# Patient Record
Sex: MALE | Race: WHITE | NOT HISPANIC OR LATINO | Employment: UNEMPLOYED | URBAN - METROPOLITAN AREA
[De-identification: names, ages, dates, MRNs, and addresses within clinical notes are randomized per-mention and may not be internally consistent; named-entity substitution may affect disease eponyms.]

---

## 2017-02-02 RX ORDER — OXYCODONE AND ACETAMINOPHEN 5; 325 MG/1; MG/1
1 TABLET ORAL EVERY 4 HOURS PRN
Refills: 0 | COMMUNITY
Start: 2017-01-19 | End: 2017-03-16 | Stop reason: HOSPADM

## 2017-02-02 RX ORDER — GABAPENTIN 600 MG/1
600 TABLET ORAL 3 TIMES DAILY
Refills: 5 | COMMUNITY
Start: 2017-01-16 | End: 2017-03-28

## 2017-02-13 ENCOUNTER — TELEPHONE (OUTPATIENT)
Dept: UROLOGY | Facility: CLINIC | Age: 27
End: 2017-02-13

## 2017-02-13 DIAGNOSIS — N20.0 NEPHROLITHIASIS: Primary | ICD-10-CM

## 2017-02-13 NOTE — TELEPHONE ENCOUNTER
----- Message from Nicole Cm sent at 2/13/2017  4:12 PM CST -----  Contact: Self [phone]  Patient is calling to get pain medication called in to the pharmacy he is having sever kidney pain. Please advice     [id number] - [place], [place] - [address] [facility] [phone] (Phone)  [phone] (Fax)

## 2017-02-14 NOTE — TELEPHONE ENCOUNTER
Patient missed his last urology appt.  He needs a CT if he has not already had this.  No pain meds until evaluation.  Last CT scan in 12/2016 did not show any obstructing stones.   CT RSS ordered.

## 2017-03-13 ENCOUNTER — TELEPHONE (OUTPATIENT)
Dept: UROLOGY | Facility: CLINIC | Age: 27
End: 2017-03-13

## 2017-03-13 ENCOUNTER — HOSPITAL ENCOUNTER (EMERGENCY)
Facility: HOSPITAL | Age: 27
Discharge: HOME | End: 2017-03-13
Attending: SURGERY
Payer: MEDICAID

## 2017-03-13 VITALS
HEART RATE: 82 BPM | WEIGHT: 235 LBS | SYSTOLIC BLOOD PRESSURE: 136 MMHG | RESPIRATION RATE: 20 BRPM | TEMPERATURE: 98 F | OXYGEN SATURATION: 100 % | BODY MASS INDEX: 33.64 KG/M2 | HEIGHT: 70 IN | DIASTOLIC BLOOD PRESSURE: 72 MMHG

## 2017-03-13 DIAGNOSIS — F11.90 OPIATE USE: Primary | ICD-10-CM

## 2017-03-13 DIAGNOSIS — G89.29 CHRONIC ABDOMINAL PAIN: ICD-10-CM

## 2017-03-13 DIAGNOSIS — R10.9 CHRONIC ABDOMINAL PAIN: ICD-10-CM

## 2017-03-13 LAB
ALBUMIN SERPL BCP-MCNC: 4.5 G/DL
ALP SERPL-CCNC: 102 U/L
ALT SERPL W/O P-5'-P-CCNC: 30 U/L
AMPHET+METHAMPHET UR QL: NEGATIVE
ANION GAP SERPL CALC-SCNC: 10 MMOL/L
AST SERPL-CCNC: 25 U/L
BARBITURATES UR QL SCN>200 NG/ML: NEGATIVE
BASOPHILS # BLD AUTO: 0.06 K/UL
BASOPHILS NFR BLD: 0.6 %
BENZODIAZ UR QL SCN>200 NG/ML: NORMAL
BILIRUB SERPL-MCNC: 1.2 MG/DL
BILIRUB UR QL STRIP: NEGATIVE
BUN SERPL-MCNC: 11 MG/DL
BZE UR QL SCN: NEGATIVE
CALCIUM SERPL-MCNC: 10.2 MG/DL
CANNABINOIDS UR QL SCN: NEGATIVE
CHLORIDE SERPL-SCNC: 103 MMOL/L
CLARITY UR: CLEAR
CO2 SERPL-SCNC: 30 MMOL/L
COLOR UR: YELLOW
CREAT SERPL-MCNC: 1.1 MG/DL
CREAT UR-MCNC: 130.6 MG/DL
DIFFERENTIAL METHOD: ABNORMAL
EOSINOPHIL # BLD AUTO: 0.2 K/UL
EOSINOPHIL NFR BLD: 1.7 %
ERYTHROCYTE [DISTWIDTH] IN BLOOD BY AUTOMATED COUNT: 14.3 %
EST. GFR  (AFRICAN AMERICAN): >60 ML/MIN/1.73 M^2
EST. GFR  (NON AFRICAN AMERICAN): >60 ML/MIN/1.73 M^2
GLUCOSE SERPL-MCNC: 81 MG/DL
GLUCOSE UR QL STRIP: NEGATIVE
HCT VFR BLD AUTO: 47.7 %
HGB BLD-MCNC: 15.8 G/DL
HGB UR QL STRIP: NEGATIVE
KETONES UR QL STRIP: NEGATIVE
LEUKOCYTE ESTERASE UR QL STRIP: NEGATIVE
LYMPHOCYTES # BLD AUTO: 2.7 K/UL
LYMPHOCYTES NFR BLD: 28.6 %
MCH RBC QN AUTO: 27.8 PG
MCHC RBC AUTO-ENTMCNC: 33.1 %
MCV RBC AUTO: 84 FL
METHADONE UR QL SCN>300 NG/ML: NEGATIVE
MONOCYTES # BLD AUTO: 1.2 K/UL
MONOCYTES NFR BLD: 13.2 %
NEUTROPHILS # BLD AUTO: 5.2 K/UL
NEUTROPHILS NFR BLD: 55.9 %
NITRITE UR QL STRIP: NEGATIVE
OPIATES UR QL SCN: NORMAL
PCP UR QL SCN>25 NG/ML: NEGATIVE
PH UR STRIP: 6 [PH] (ref 5–8)
PLATELET # BLD AUTO: 211 K/UL
PMV BLD AUTO: 11.7 FL
POTASSIUM SERPL-SCNC: 4 MMOL/L
PROT SERPL-MCNC: 8.3 G/DL
PROT UR QL STRIP: NEGATIVE
RBC # BLD AUTO: 5.69 M/UL
SODIUM SERPL-SCNC: 143 MMOL/L
SP GR UR STRIP: 1.02 (ref 1–1.03)
TOXICOLOGY INFORMATION: NORMAL
URN SPEC COLLECT METH UR: NORMAL
UROBILINOGEN UR STRIP-ACNC: NEGATIVE EU/DL
WBC # BLD AUTO: 9.34 K/UL

## 2017-03-13 PROCEDURE — 99284 EMERGENCY DEPT VISIT MOD MDM: CPT | Mod: 25

## 2017-03-13 PROCEDURE — 63600175 PHARM REV CODE 636 W HCPCS: Performed by: SURGERY

## 2017-03-13 PROCEDURE — 96372 THER/PROPH/DIAG INJ SC/IM: CPT

## 2017-03-13 PROCEDURE — 82570 ASSAY OF URINE CREATININE: CPT

## 2017-03-13 PROCEDURE — 81003 URINALYSIS AUTO W/O SCOPE: CPT

## 2017-03-13 PROCEDURE — 80053 COMPREHEN METABOLIC PANEL: CPT

## 2017-03-13 PROCEDURE — 85025 COMPLETE CBC W/AUTO DIFF WBC: CPT

## 2017-03-13 PROCEDURE — 36415 COLL VENOUS BLD VENIPUNCTURE: CPT

## 2017-03-13 RX ORDER — KETOROLAC TROMETHAMINE 10 MG/1
10 TABLET, FILM COATED ORAL EVERY 6 HOURS PRN
Qty: 15 TABLET | Refills: 0 | Status: SHIPPED | OUTPATIENT
Start: 2017-03-13 | End: 2017-03-28

## 2017-03-13 RX ORDER — KETOROLAC TROMETHAMINE 30 MG/ML
60 INJECTION, SOLUTION INTRAMUSCULAR; INTRAVENOUS
Status: COMPLETED | OUTPATIENT
Start: 2017-03-13 | End: 2017-03-13

## 2017-03-13 RX ADMIN — KETOROLAC TROMETHAMINE 60 MG: 30 INJECTION, SOLUTION INTRAMUSCULAR at 10:03

## 2017-03-13 NOTE — DISCHARGE INSTRUCTIONS
Abdominal Pain    Abdominal pain is pain in the stomach or belly area. Everyone has this pain from time to time. In many cases it goes away on its own. But abdominal pain can sometimes be due to a serious problem, such as appendicitis. So its important to know when to seek help.  Causes of abdominal pain  There are many possible causes of abdominal pain. Common causes in adults include:  · Constipation, diarrhea, or gas  · Stomach acid flowing back up into the esophagus (acid reflux or heartburn)  · Severe acid reflux, called GERD (gastroesophageal reflux disease)  · A sore in the lining of the stomach or small intestine (peptic ulcer)  · Inflammation of the gallbladder, liver, or pancreas  · Gallstones or kidney stones  · Appendicitis   · Intestinal blockage   · An internal organ pushing through a muscle or other tissue (hernia)  · Urinary tract infections  · In women, menstrual cramps, fibroids, or endometriosis  · Inflammation or infection of the intestines  Diagnosing the cause of abdominal pain  Your healthcare provider will do a physical exam help find the cause of your pain. If needed, tests will be ordered. Belly pain has many possible causes. So it can be hard to find the reason for your pain. Giving details about your pain can help. Tell your provider where and when you feel the pain, and what makes it better or worse. Also let your provider know if you have other symptoms such as:  · Fever  · Tiredness  · Upset stomach (nausea)  · Vomiting  · Changes in bathroom habits  Treating abdominal pain  Some causes of pain need emergency medical treatment right away. These include appendicitis or a bowel blockage. Other problems can be treated with rest, fluids, or medicines. Your healthcare provider can give you specific instructions for treatment or self-care based on what is causing your pain.  If you have vomiting or diarrhea, sip water or other clear fluids. When you are ready to eat solid foods again,  start with small amounts of easy-to-digest, low-fat foods. These include apple sauce, toast, or crackers.   When to seek medical care  Call 911 or go to the hospital right away if you:  · Cant pass stool and are vomiting  · Are vomiting blood or have bloody diarrhea or black, tarry diarrhea  · Have chest, neck, or shoulder pain  · Feel like you might pass out  · Have pain in your shoulder blades with nausea  · Have sudden, severe belly pain  · Have new, severe pain unlike any you have felt before  · Have a belly that is rigid, hard, and tender to touch  Call your healthcare provider if you have:  · Pain for more than 5 days  · Bloating for more than 2 days  · Diarrhea for more than 5 days  · A fever of 100.4°F (38.0°C) or higher, or as directed by your provider  · Pain that gets worse  · Weight loss for no reason  · Continued lack of appetite  · Blood in your stool  How to prevent abdominal pain  Here are some tips to help prevent abdominal pain:  · Eat smaller amounts of food at one time.  · Avoid greasy, fried, or other high-fat foods.  · Avoid foods that give you gas.  · Exercise regularly.  · Drink plenty of fluids.  To help prevent GERD symptoms:  · Quit smoking.  · Reduce alcohol and certain foods that increase stomach acid.  · Avoid aspirin and over-the-counter pain and fever medicines (NSAIDS or nonsteroidal anti-inflammatory drugs), if possible  · Lose extra weight.  · Finish eating at least 2 hours before you go to bed or lie down.  · Raise the head of your bed.  Date Last Reviewed: 7/1/2016  © 9116-7936 [employer]. [address] [place] [address], [place] [address]. All rights reserved. This information is not intended as a substitute for professional medical care. Always follow your healthcare professional's instructions.

## 2017-03-13 NOTE — TELEPHONE ENCOUNTER
----- Message from Avis Ledezma sent at 3/13/2017  8:19 AM CDT -----  Contact: [phone]/ pt's wife   Patient has kidney stones and would like to be seen today. Please advise.

## 2017-03-13 NOTE — TELEPHONE ENCOUNTER
----- Message from Avis Ledezma sent at 3/13/2017 11:08 AM CDT -----  Contact: [phone]/ pt's wife   Patient called in returning your call. Please advise.

## 2017-03-13 NOTE — ED AVS SNAPSHOT
[facility]  [address] [facility] [address]               Emmanuel Ervin Jr.   3/13/2017  9:23 AM   ED    Description:  Male : 1990   Department:  [facility]           Your Care was Coordinated By:     Provider Role From To    Wang Fraser MD Attending Provider 17 0913 --      Reason for Visit     Abdominal Pain           Diagnoses this Visit        Comments    Chronic abdominal pain    -  Primary       ED Disposition     ED Disposition Condition Comment    Discharge             To Do List           Follow-up Information     Follow up with Paul Schrader MD. Schedule an appointment as soon as possible for a visit in 2 days.    Specialty:  Family Medicine    Contact information:    [address] [facility]  [place] [address] [phone]         These Medications        Disp Refills Start End    ketorolac (TORADOL) 10 mg tablet 15 tablet 0 3/13/2017     Take 1 tablet (10 mg total) by mouth every 6 (six) hours as needed for Pain. - Oral    Pharmacy: [id number] - [place], [place] - [address] [facility] Ph #: [phone]         [facility]     [facility] -  Assistance  Registered nurses in the [facility] provide clinical advisement, health education, appointment booking, and other advisory services.  Call for this free service at [phone].             Medications           Message regarding Medications     Verify the changes and/or additions to your medication regime listed below are the same as discussed with your clinician today.  If any of these changes or additions are incorrect, please notify your healthcare provider.        START taking these NEW medications        Refills    ketorolac (TORADOL) 10 mg tablet 0    Sig: Take 1 tablet (10 mg total) by mouth every 6 (six) hours as needed for Pain.    Class: Normal    Route: Oral      These medications were administered  "today        Dose Freq    ketorolac injection 60 mg 60 mg ED 1 Time    Sig: Inject 2 mLs (60 mg total) into the muscle ED 1 Time.    Class: Normal    Route: Intramuscular           Verify that the below list of medications is an accurate representation of the medications you are currently taking.  If none reported, the list may be blank. If incorrect, please contact your healthcare provider. Carry this list with you in case of emergency.           Current Medications     alprazolam (XANAX) 1 MG tablet Take 1 tablet by mouth 2 (two) times daily.    atenolol (TENORMIN) 50 MG tablet Take 50 mg by mouth once daily.    gabapentin (NEURONTIN) 600 MG tablet Take 600 mg by mouth 3 (three) times daily.    ketorolac (TORADOL) 10 mg tablet Take 1 tablet (10 mg total) by mouth every 6 (six) hours as needed for Pain.    oxycodone-acetaminophen (PERCOCET)  mg per tablet Take 1 tablet by mouth every 4 (four) hours as needed for Pain.    oxycodone-acetaminophen (PERCOCET) 5-325 mg per tablet Take 1 tablet by mouth every 4 (four) hours as needed. for pain.    quetiapine (SEROQUEL) 300 MG Tab Take by mouth.           Clinical Reference Information           Your Vitals Were     BP Pulse Temp Resp Height Weight    136/98 (BP Location: Left arm, Patient Position: Sitting) 100 98 °F (36.7 °C) (Oral) 18 5' 10" (1.778 m) 106.6 kg (235 lb)    SpO2 BMI             100% 33.72 kg/m2         Allergies as of 3/13/2017     No Known Allergies      Immunizations Administered on Date of Encounter - 3/13/2017     None      ED Micro, Lab, POCT     Start Ordered       Status Ordering Provider    03/13/17 1022 03/13/17 1021  CBC auto differential  STAT      Final result     03/13/17 1022 03/13/17 1021  Comprehensive metabolic panel  STAT      Final result     03/13/17 0927 03/13/17 0926  Urinalysis  STAT      Final result     03/13/17 0927 03/13/17 0926  Drug screen panel, emergency  STAT      Final result       ED Imaging Orders     Start " Ordered       Status Ordering Provider    03/13/17 1022 03/13/17 1021  CT Renal Stone Study ABD Pelvis WO  1 time imaging     Comments:  Right flank and right lower quadrant pain, insists that he is having a recurrent kidney stone.  Patient had a negative CT in December    Final result         Discharge Instructions         Abdominal Pain    Abdominal pain is pain in the stomach or belly area. Everyone has this pain from time to time. In many cases it goes away on its own. But abdominal pain can sometimes be due to a serious problem, such as appendicitis. So its important to know when to seek help.  Causes of abdominal pain  There are many possible causes of abdominal pain. Common causes in adults include:  · Constipation, diarrhea, or gas  · Stomach acid flowing back up into the esophagus (acid reflux or heartburn)  · Severe acid reflux, called GERD (gastroesophageal reflux disease)  · A sore in the lining of the stomach or small intestine (peptic ulcer)  · Inflammation of the gallbladder, liver, or pancreas  · Gallstones or kidney stones  · Appendicitis   · Intestinal blockage   · An internal organ pushing through a muscle or other tissue (hernia)  · Urinary tract infections  · In women, menstrual cramps, fibroids, or endometriosis  · Inflammation or infection of the intestines  Diagnosing the cause of abdominal pain  Your healthcare provider will do a physical exam help find the cause of your pain. If needed, tests will be ordered. Belly pain has many possible causes. So it can be hard to find the reason for your pain. Giving details about your pain can help. Tell your provider where and when you feel the pain, and what makes it better or worse. Also let your provider know if you have other symptoms such as:  · Fever  · Tiredness  · Upset stomach (nausea)  · Vomiting  · Changes in bathroom habits  Treating abdominal pain  Some causes of pain need emergency medical treatment right away. These include appendicitis  or a bowel blockage. Other problems can be treated with rest, fluids, or medicines. Your healthcare provider can give you specific instructions for treatment or self-care based on what is causing your pain.  If you have vomiting or diarrhea, sip water or other clear fluids. When you are ready to eat solid foods again, start with small amounts of easy-to-digest, low-fat foods. These include apple sauce, toast, or crackers.   When to seek medical care  Call 911 or go to the hospital right away if you:  · Cant pass stool and are vomiting  · Are vomiting blood or have bloody diarrhea or black, tarry diarrhea  · Have chest, neck, or shoulder pain  · Feel like you might pass out  · Have pain in your shoulder blades with nausea  · Have sudden, severe belly pain  · Have new, severe pain unlike any you have felt before  · Have a belly that is rigid, hard, and tender to touch  Call your healthcare provider if you have:  · Pain for more than 5 days  · Bloating for more than 2 days  · Diarrhea for more than 5 days  · A fever of 100.4°F (38.0°C) or higher, or as directed by your provider  · Pain that gets worse  · Weight loss for no reason  · Continued lack of appetite  · Blood in your stool  How to prevent abdominal pain  Here are some tips to help prevent abdominal pain:  · Eat smaller amounts of food at one time.  · Avoid greasy, fried, or other high-fat foods.  · Avoid foods that give you gas.  · Exercise regularly.  · Drink plenty of fluids.  To help prevent GERD symptoms:  · Quit smoking.  · Reduce alcohol and certain foods that increase stomach acid.  · Avoid aspirin and over-the-counter pain and fever medicines (NSAIDS or nonsteroidal anti-inflammatory drugs), if possible  · Lose extra weight.  · Finish eating at least 2 hours before you go to bed or lie down.  · Raise the head of your bed.  Date Last Reviewed: 7/1/2016  © 8716-1223 [employer]. [address] [place] [address], [place] [address]. All rights  reserved. This information is not intended as a substitute for professional medical care. Always follow your healthcare professional's instructions.          Your Scheduled Appointments     May 11, 2017  1:30 PM CDT   New Patient with MD MARIBEL Ortiz II - Urology ([facility])    [address]  [place] [id number] [phone]              [facility] Sign-Up     Activating your [facility] account is as easy as 1-2-3!     1) Visit [email or web address], select Sign Up Now, enter this activation code and your date of birth, then select Next.  Activation code not generated  Current Patient Portal Status: Account disabled      2) Create a username and password to use when you visit [facility] in the future and select a security question in case you lose your password and select Next.    3) Enter your e-mail address and click Sign Up!    Additional Information  If you have questions, please e-mail [email or web address] or call [phone] to talk to our [facility] staff. Remember, [facility] is NOT to be used for urgent needs. For medical emergencies, dial 911.          [facility] complies with applicable Federal civil rights laws and does not discriminate on the basis of race, color, national origin, age, disability, or sex.        Language Assistance Services     ATTENTION: Language assistance services are available, free of charge. Please call [phone].      ATENCIÓN: Si habla español, tiene a rios disposición servicios gratuitos de asistencia lingüística. Llame al [phone].     CHÚ Ý: N?u b?n nói Ti?ng Vi?t, có các d?ch v? h? tr? ngôn ng? mi?n phí dành cho b?n. G?i s? [phone].

## 2017-03-14 NOTE — ED PROVIDER NOTES
[facility]                                        March 13, 2017                   Chief Complaint  26 y.o. male with Abdominal Pain (RLQ)    History of Present Illness  Emmanuel Ervin Jr. presents to the emergency room with right flank pain today  Patient states he has long history of kidney stones with recurrent right flank pain  Patient on exam is no obvious right flank pain, normal physical exam in the ER  Pt denies any dysuria, hematuria, pyuria, risk for sexually transmitted disease  Patient had a negative CAT scan for kidney stones in December 2016 in ER  Patient states he is out of pain medication with 5/10 right flank pain every week    The history is provided by the patient     Past Medical History   -- Hypertension     -- Kidney stones     -- UTI (urinary tract infection)        Past Surgical History   -- Amputation of 2 toes to right foot       -- Extracorporeal shock wave lithotripsy       -- Skin graft         No Known Allergies     Review of Systems and Physical Exam     Review of Systems  -- Constitution - no fever, denies fatigue, no weakness, no chills  -- Eyes - no tearing or redness, no visual disturbance  -- Ear, Nose - no tinnitus or earache, no nasal congestion or discharge  -- Mouth,Throat - no sore throat, no toothache, normal voice, normal swallowing  -- Respiratory - denies cough and congestion, no shortness of breath, no GÓMEZ  -- Cardiovascular - denies chest pain, no palpitations, denies claudication  -- Gastrointestinal - denies abdominal pain, nausea, vomiting, or diarrhea  -- Genitourinary - right flank pain, no hematuria or frequency   -- Musculoskeletal - denies back pain, negative for myalgias and arthralgias   -- Neurological - no headache, denies weakness or seizure; no LOC  -- Skin - denies pallor, rash, or changes in skin. no hives or welts noted    Vital Signs  -- His oral temperature is 97.9 °F (36.6 °C).   -- His blood pressure is 136/72 and his pulse is  82.   -- His respiration is 20 and oxygen saturation is 100%.      Physical Exam  -- Nursing note and vitals reviewed  -- Constitutional: Appears well-developed and well-nourished  -- Head: Atraumatic. Normocephalic. No obvious abnormality  -- Eyes: Pupils are equal and reactive to light. Normal conjunctiva and lids  -- Cardiac: Normal rate, regular rhythm and normal heart sounds  -- Pulmonary: Normal respiratory effort, breath sounds clear to auscultation  -- Abdominal: Soft, no tenderness. Normal bowel sounds. Normal liver edge  -- Genitourinary: no flank pain on exam, no suprapubic pain by palpation   -- Musculoskeletal: Normal range of motion, no effusions. Joints stable   -- Neurological: No focal deficits. Showed good interaction with staff    Emergency Room Course     Treatment and Evaluation  -- The CT stone protocol performed in the ER today was negative for acute pathology   -- The electrolytes drawn in the ER today were within normal limits   -- The CBC drawn in the ER today was within normal limits   -- The electrolytes drawn in the ER today were within normal limits   -- Urinalysis performed during this ER visit showed no signs of infection   -- UDS was positive  -- IM Toradol given today in the ER    ED Physician Notes  -- Patient has a positive drug screen for opiates, he states he has no opiate Rx  -- Patient was counseled we will treat him with nonnarcotics, follow up with urology    Diagnosis  -- The primary encounter diagnosis was Opiate use.   -- A diagnosis of Chronic abdominal pain was also pertinent to this visit.    Disposition and Plan  -- Disposition: home  -- Condition: stable  -- Follow-up: Patient to follow up with Paul Schrader MD in 1-2 days.  -- I advised the patient that we have found no life threatening condition today  -- At this time, I believe the patient is clinically stable for discharge.   -- The patient acknowledges that close follow up with a MD is required   -- Patient  agrees to comply with all instruction and direction given in the ER    This note is dictated on Dragon Natural Speaking word recognition program.  There are word recognition mistakes that are occasionally missed on review.           Wang Fraser MD  03/14/17 0636

## 2017-03-16 ENCOUNTER — OFFICE VISIT (OUTPATIENT)
Dept: UROLOGY | Facility: CLINIC | Age: 27
End: 2017-03-16
Payer: MEDICAID

## 2017-03-16 VITALS
BODY MASS INDEX: 33.79 KG/M2 | HEIGHT: 70 IN | HEART RATE: 108 BPM | SYSTOLIC BLOOD PRESSURE: 123 MMHG | WEIGHT: 236 LBS | DIASTOLIC BLOOD PRESSURE: 86 MMHG

## 2017-03-16 DIAGNOSIS — N20.0 NEPHROLITHIASIS: Primary | ICD-10-CM

## 2017-03-16 LAB
BILIRUB UR QL STRIP: NEGATIVE
CLARITY UR REFRACT.AUTO: CLEAR
COLOR UR AUTO: YELLOW
GLUCOSE UR QL STRIP: NEGATIVE
HGB UR QL STRIP: NEGATIVE
KETONES UR QL STRIP: NEGATIVE
LEUKOCYTE ESTERASE UR QL STRIP: NEGATIVE
NITRITE UR QL STRIP: NEGATIVE
PH UR STRIP: 5 [PH] (ref 5–8)
PROT UR QL STRIP: NEGATIVE
SP GR UR STRIP: 1.02 (ref 1–1.03)
URN SPEC COLLECT METH UR: NORMAL
UROBILINOGEN UR STRIP-ACNC: NEGATIVE EU/DL

## 2017-03-16 PROCEDURE — 81003 URINALYSIS AUTO W/O SCOPE: CPT

## 2017-03-16 PROCEDURE — 99999 PR PBB SHADOW E&M-EST. PATIENT-LVL III: CPT | Mod: PBBFAC,,, | Performed by: UROLOGY

## 2017-03-16 PROCEDURE — 99214 OFFICE O/P EST MOD 30 MIN: CPT | Mod: S$PBB,,, | Performed by: UROLOGY

## 2017-03-16 PROCEDURE — 99213 OFFICE O/P EST LOW 20 MIN: CPT | Mod: PBBFAC,PO | Performed by: UROLOGY

## 2017-03-16 PROCEDURE — 81002 URINALYSIS NONAUTO W/O SCOPE: CPT | Mod: PBBFAC,PO | Performed by: UROLOGY

## 2017-03-16 PROCEDURE — 87086 URINE CULTURE/COLONY COUNT: CPT

## 2017-03-16 RX ORDER — DULOXETIN HYDROCHLORIDE 60 MG/1
CAPSULE, DELAYED RELEASE ORAL
Refills: 5 | COMMUNITY
Start: 2017-02-21 | End: 2017-03-28

## 2017-03-16 RX ORDER — OXYCODONE AND ACETAMINOPHEN 5; 325 MG/1; MG/1
1 TABLET ORAL EVERY 4 HOURS PRN
Qty: 30 TABLET | Refills: 0 | Status: SHIPPED | OUTPATIENT
Start: 2017-03-16 | End: 2017-03-26

## 2017-03-16 RX ORDER — DICLOFENAC SODIUM 75 MG/1
TABLET, DELAYED RELEASE ORAL
Refills: 0 | COMMUNITY
Start: 2017-02-14 | End: 2017-03-28

## 2017-03-16 RX ORDER — TAMSULOSIN HYDROCHLORIDE 0.4 MG/1
1 CAPSULE ORAL DAILY
Refills: 0 | COMMUNITY
Start: 2017-02-08 | End: 2017-03-28

## 2017-03-16 NOTE — PROGRESS NOTES
Subjective:      Patient ID: Emmanuel Ervin Jr. is a 26 y.o. male.    Chief Complaint: Flank Pain and Nephrolithiasis    Patient is a 26 y.o. male who is established to our clinic and was initially referred by their PCP, Dr. Schrader for evaluation of kidney stones.     HPI    Urolithiasis  Patient complains of right flank pain with radiation to the abdomen. Onset of symptoms was abrupt starting 2 days ago with stable course since that time. Patient describes the pain as sharp, intermittent, occurring about every 1 month and lasting variable amount of time per episode and rated as 8 / 10. The patient has had nausea and no vomiting and no diaphoresis. There has been no fever or chills. The patient is not complaining of dysuria or frequency. Risk factors for urolithiasis: family history of stone disease, history of stone disease and poor fluid intake.    No gross hematuria.  Pain worse with walking.  toradol does not help.    Review of Systems   All other systems reviewed and negative except pertinent positives noted in HPI.    Objective:     Physical Exam   Constitutional: He is oriented to person, place, and time. He appears well-developed and well-nourished. No distress.   HENT:   Head: Normocephalic and atraumatic.   Eyes: No scleral icterus.   Neck: No tracheal deviation present.   Pulmonary/Chest: Effort normal. No respiratory distress.   Neurological: He is alert and oriented to person, place, and time.   Psychiatric: He has a normal mood and affect. His behavior is normal. Judgment and thought content normal.     Assessment:     1. Nephrolithiasis      Plan:     1. Kidney stone:  -General risk factors for kidney stones and the conservative measures to prevent kidney stones in the future were discussed with the patient in detail.  The patient was encouraged to drink 2-3 liters of water a day, limit iced tea and suki as well as foods high in oxalate.  They were cautioned to try to limit salt and red meat intake.   We also discussed adding citrate to the diet with the addition of jenaro or lemon juice to their water or alternatively with crystal light.   -Imaging review: 3 small lower pole right renal stones, no hydronephrosis.  -24 hour urine: will perform after stones are removed.  -I have explained the indication, risks, benefits, and alternatives of the procedure in detail.  Risks including but not limited to bleeding, infection, injury to the urethra, bladder, ureter, need for additional treatments, inability or incomplete removal of kidney stones, pain, and discomfort related to the stent were discussed in depth with the patient.  The patient voices understanding and all questions have been answered.  The patient agrees to proceed as planned with right ureteroscopy, laser lithotripsy, and ureteral stent placement.  -urine dip: no evidence of infection, pH5, nitri-, leuk-

## 2017-03-16 NOTE — MR AVS SNAPSHOT
[facility]  [address] [place] [address] [place] [address]  Phone: [phone]                  Emmanuel Ervin Jr.   3/16/2017 1:15 PM   Office Visit    Description:  Male : 1990   Provider:  Cuco Washington MD   Department:  [facility]           Reason for Visit     Flank Pain     Nephrolithiasis           Diagnoses this Visit        Comments    Nephrolithiasis    -  Primary            To Do List           Goals (5 Years of Data)     None      Follow-Up and Disposition     Return in about 2 weeks (around 3/29/2017).       These Medications        Disp Refills Start End    oxycodone-acetaminophen (PERCOCET) 5-325 mg per tablet 30 tablet 0 3/16/2017 3/26/2017    Take 1 tablet by mouth every 4 (four) hours as needed for Pain. - Oral    Pharmacy: [facility]/pharmacy #[id number] - [place], [place] - [address] [facility] #: [phone]         [facility]     [facility] Nurse Care Line -  Assistance  Registered nurses in the [facility] provide clinical advisement, health education, appointment booking, and other advisory services.  Call for this free service at [phone].             Medications           Message regarding Medications     Verify the changes and/or additions to your medication regime listed below are the same as discussed with your clinician today.  If any of these changes or additions are incorrect, please notify your healthcare provider.        START taking these NEW medications        Refills    oxycodone-acetaminophen (PERCOCET) 5-325 mg per tablet 0    Sig: Take 1 tablet by mouth every 4 (four) hours as needed for Pain.    Class: Normal    Route: Oral      STOP taking these medications     oxycodone-acetaminophen (PERCOCET)  mg per tablet Take 1 tablet by mouth every 4 (four) hours as needed for Pain.           Verify that the below list of medications is an accurate representation of the medications you are currently taking.  If none reported, the  "list may be blank. If incorrect, please contact your healthcare provider. Carry this list with you in case of emergency.           Current Medications     alprazolam (XANAX) 1 MG tablet Take 1 tablet by mouth 2 (two) times daily.    atenolol (TENORMIN) 50 MG tablet Take 50 mg by mouth once daily.    diclofenac (VOLTAREN) 75 MG EC tablet TAKE ONE TABLET BY MOUTH TWICE DAILY FOR FOOT PAIN    duloxetine (CYMBALTA) 60 MG capsule TAKE 1 CAPSULE(S) EVERY DAY BY ORAL ROUTE.    gabapentin (NEURONTIN) 600 MG tablet Take 600 mg by mouth 3 (three) times daily.    ketorolac (TORADOL) 10 mg tablet Take 1 tablet (10 mg total) by mouth every 6 (six) hours as needed for Pain.    quetiapine (SEROQUEL) 300 MG Tab Take by mouth.    tamsulosin (FLOMAX) 0.4 mg Cp24 Take 1 capsule by mouth once daily.    oxycodone-acetaminophen (PERCOCET) 5-325 mg per tablet Take 1 tablet by mouth every 4 (four) hours as needed for Pain.           Clinical Reference Information           Your Vitals Were     BP Pulse Height Weight BMI    123/86 108 5' 10" (1.778 m) 107 kg (236 lb) 33.86 kg/m2      Blood Pressure          Most Recent Value    BP  123/86      Allergies as of 3/16/2017     No Known Allergies      Immunizations Administered on Date of Encounter - 3/16/2017     None      Orders Placed During Today's Visit      Normal Orders This Visit    Urinalysis     Urine culture       [facility] Sign-Up     Activating your [facility] account is as easy as 1-2-3!     1) Visit [email or web address], select Sign Up Now, enter this activation code and your date of birth, then select Next.  Activation code not generated  Current Patient Portal Status: Account disabled      2) Create a username and password to use when you visit [facility] in the future and select a security question in case you lose your password and select Next.    3) Enter your e-mail address and click Sign Up!    Additional Information  If you have questions, please e-mail [email or web address] or " call [phone] to talk to our [facility] staff. Remember, [facility] is NOT to be used for urgent needs. For medical emergencies, dial 911.         Language Assistance Services     ATTENTION: Language assistance services are available, free of charge. Please call [phone].      ATENCIÓN: Si habla christiane, tiene a rios disposición servicios gratuitos de asistencia lingüística. Llame al [phone].     CHÚ Ý: N?u b?n nói Ti?ng Vi?t, có các d?ch v? h? tr? ngôn ng? mi?n phí dành cho b?n. G?i s? [phone].         [facility] complies with applicable Federal civil rights laws and does not discriminate on the basis of race, color, national origin, age, disability, or sex.

## 2017-03-17 LAB — BACTERIA UR CULT: NO GROWTH

## 2017-03-28 ENCOUNTER — ANESTHESIA EVENT (OUTPATIENT)
Dept: SURGERY | Facility: HOSPITAL | Age: 27
End: 2017-03-28
Payer: MEDICAID

## 2017-03-28 ENCOUNTER — HOSPITAL ENCOUNTER (OUTPATIENT)
Dept: PREADMISSION TESTING | Facility: HOSPITAL | Age: 27
Discharge: HOME | End: 2017-03-28
Attending: UROLOGY
Payer: MEDICAID

## 2017-03-28 VITALS
SYSTOLIC BLOOD PRESSURE: 125 MMHG | HEART RATE: 90 BPM | DIASTOLIC BLOOD PRESSURE: 84 MMHG | RESPIRATION RATE: 16 BRPM | OXYGEN SATURATION: 99 % | HEIGHT: 70 IN | WEIGHT: 231 LBS | BODY MASS INDEX: 33.07 KG/M2

## 2017-03-28 RX ORDER — LIDOCAINE HYDROCHLORIDE 10 MG/ML
1 INJECTION, SOLUTION EPIDURAL; INFILTRATION; INTRACAUDAL; PERINEURAL ONCE
Status: CANCELLED | OUTPATIENT
Start: 2017-03-28 | End: 2017-03-28

## 2017-03-28 RX ORDER — SODIUM CHLORIDE, SODIUM LACTATE, POTASSIUM CHLORIDE, CALCIUM CHLORIDE 600; 310; 30; 20 MG/100ML; MG/100ML; MG/100ML; MG/100ML
INJECTION, SOLUTION INTRAVENOUS CONTINUOUS
Status: CANCELLED | OUTPATIENT
Start: 2017-03-28

## 2017-03-28 NOTE — ANESTHESIA PREPROCEDURE EVALUATION
03/28/2017     Emmanuel Ervin Jr. is a 26 y.o., male is scheduled for cystoscopic right stent placement and ureteroscopic stone extraction under [facility] on 3/29/2017.     Past Surgical History:   Procedure Laterality Date    EXTRACORPOREAL SHOCK WAVE LITHOTRIPSY  2016    multiple since 2014    SKIN GRAFT Right     s/p traumatic injury to foot at 1 y/o    TOE AMPUTATION Right     due to  accident at 1 y/o         [facility] Anesthesia Evaluation    I have reviewed the Patient Summary Reports.    I have reviewed the Nursing Notes.   I have reviewed the Medications.     Review of Systems  Anesthesia Hx:  Hx of Anesthetic complications  History of prior surgery of interest to airway management or planning: Previous anesthesia: General, MAC   Procedure performed at an [facility]. Airway issues documented on chart review include mask, easy, easy direct laryngoscopy , view on direct laryngoscopy Grade I  Personal Hx of Anesthesia complications  Unsatisfactory Post-Operative Pain Control (pt reports significant pain after last procedure 5/2016)  Social:  Non-Smoker, No Alcohol Use    Hematology/Oncology:  Hematology Normal        EENT/Dental:EENT/Dental Normal   Cardiovascular:   Exercise tolerance: good Hypertension, well controlled Denies Dysrhythmias.   Denies Angina.        Pulmonary:   Denies Shortness of breath.    Renal/:   renal calculi Denies hematuria or dysuria, denies pain   Hepatic/GI:   GERD, well controlled    Neurological:  Neurology Normal    Endocrine:  Endocrine Normal    Psych:   anxiety depression            Physical Exam  General:  Obesity    Airway/Jaw/Neck:  Airway Findings: Mouth Opening: Normal Tongue: Normal  General Airway Assessment: Adult  Mallampati: II  Improves to I with phonation.  TM Distance: Normal, at least 6 cm  Jaw/Neck Findings:  Neck ROM: Normal ROM  Neck Findings:   Girth Increased     Eyes/Ears/Nose:  EYES/EARS/NOSE FINDINGS: Normal   Dental:  Dental Findings: In tact, Periodontal disease, Mild   Chest/Lungs:  Chest/Lungs Clear    Heart/Vascular:  Heart Findings: Normal Heart murmur: negative    Abdomen:  Abdomen Findings: Normal      Mental Status:  Mental Status Findings: Normal        Anesthesia Plan  Type of Anesthesia, risks & benefits discussed:  Anesthesia Type:  general  Patient's Preference:   Intra-op Monitoring Plan:   Intra-op Monitoring Plan Comments:   Post Op Pain Control Plan:   Post Op Pain Control Plan Comments:   Induction:   IV  Beta Blocker:         Informed Consent: Patient understands risks and agrees with Anesthesia plan.  Questions answered.   ASA Score: 2     Day of Surgery Review of History & Physical:    H&P update referred to the surgeon.     Anesthesia Plan Notes: Anesthesia consent will be obtained prior to procedure on 3/29/2017.          Ready For Surgery From Anesthesia Perspective.

## 2017-03-28 NOTE — IP AVS SNAPSHOT
hospitals  [address]  [place] [address]  Phone: [phone]           Patient Discharge Instructions  Our goal is to set you up for success. This packet includes information on your condition, medications, and your home care. It will help you care for yourself to prevent having to return to the hospital.     Please ask your nurse if you have any questions.      There are many details to remember when preparing for your surgery. Here is what you will need to do, please ask your nurse if there are more specific instructions and if you have any questions:    1. Before procedure Do not smoke or drink alcoholic beverages 24 hours prior to your procedure. Do not eat or drink anything 8 hours before your procedure - this includes gum, mints, and candy.     2. Day of procedure Please remove all jewelry for the procedure. If you wear contact lenses, dentures, hearing aids or glasses, bring a container to put them in during your surgery and give to a family member.  If your doctor has scheduled you for an overnight stay, bring a small overnight bag with any personal items that you need.      3. After procedure  Make arrangements in advance for transportation home by a responsible adult. It is not safe to drive a vehicle during the 24 hours following surgery.     PLEASE NOTE: You may be contacted the day before your surgery to confirm your surgery date and arrival time. The Surgery schedule has many variables which may affect the time of your surgery case. Family members should be available if your surgery time changes.               [facility]  Unless otherwise directed by your provider, please contact [facility], our nurse care line that is available for 24/7 assistance.     [phone] (toll-free)    Registered nurses in the [facility] provide clinical advisement, health education, appointment booking, and other advisory services.                    ** Verify the list of  medication(s) below is accurate and up to date. Carry this with you in case of emergency. If your medications have changed, please notify your healthcare provider.             Medication List      TAKE these medications        Additional Info                      alprazolam 1 MG tablet   Commonly known as:  XANAX   Refills:  0   Dose:  1 tablet    Instructions:  Take 1 tablet by mouth 2 (two) times daily.     Begin Date    AM    Noon    PM    Bedtime       atenolol 50 MG tablet   Commonly known as:  TENORMIN   Refills:  0   Dose:  50 mg    Instructions:  Take 50 mg by mouth once daily.     Begin Date    AM    Noon    PM    Bedtime       quetiapine 300 MG Tab   Commonly known as:  SEROQUEL   Refills:  0    Instructions:  Take by mouth.     Begin Date    AM    Noon    PM    Bedtime                  Please bring to all follow up appointments:    1. A copy of your discharge instructions.  2. All medicines you are currently taking in their original bottles.  3. Identification and insurance card.    Please arrive 15 minutes ahead of scheduled appointment time.    Please call 24 hours in advance if you must reschedule your appointment and/or time.        Your Scheduled Appointments     May 11, 2017  1:30 PM CDT   New Patient with MD MARIBEL Ortiz II - Urology ([facility])    [address] [facility] [id number] [phone]              Your Future Surgeries/Procedures     Mar 29, 2017   Surgery with Cuco Washington MD   [facility] ([facility])    [address] [place] [id number] [phone]                  Discharge Instructions       Your surgery is scheduled for 3/29/17.    Please report to Outpatient Surgery Intake Office on the 2nd FLOOR at 7:45a.m.          INSTRUCTIONS IMPORTANT!!!  ¨ Do not eat or drink after 12 midnight-including water. OK to brush teeth, no   gum, candy or mints!    ¨ Take only these medicines with a small swallow of  water-morning of surgery: Atenolol          ____  No powder, lotions or creams to surgical area.  ____  Please remove all jewelry, including piercings and leave at home.  ____  No money or valuables needed. Please leave at home.  ____  Please bring any documents given by your doctor.  ____  If going home the same day, arrange for a ride home. You will not be able to             drive if Anesthesia was used.  ____  Wear loose fitting clothing. Allow for dressings, bandages.  ____  Stop Aspirin, Ibuprofen, Motrin and Aleve at least 3-5 days before surgery, unless otherwise instructed by your doctor, or the nurse.   You MAY use Tylenol/acetaminophen until day of surgery.  ____  If you take diabetic medication, do not take am of surgery unless instructed by Doctor.  ____  Call MD for temperature above 101 degrees.  ____ Stop taking any Fish Oil supplement or any Vitamins that contain Vitamin E at least 5 days prior to surgery.  ____ Do Not wear your contact lenses the day of your procedure.  You may wear your glasses.        I have read or had read and explained to me, and understand the above information.  Additional comments or instructions:  For additional questions call [phone]       Gargle with Listerine twice a day for 2 days prior to surgery, including the morning of surgery.      Pre-Op Bathing Instructions    Before surgery, you can play an important role in your own health.    Because skin is not sterile, we need to be sure that your skin is as free of germs as possible. By following the instructions below, you can reduce the number of germs on your skin before surgery.    IMPORTANT: You will need to shower with a special soap called Hibiclens*, available at any pharmacy.  If you are allergic to Chlorhexidine (the antiseptic in Hibiclens), use an antibacterial soap such as Dial Soap for your preoperative shower.  You will shower with Hibiclens both the night before your surgery and the morning of your  surgery.  Do not use Hibiclens on the head, face or genitals to avoid injury to those areas.    STEP #1: THE NIGHT BEFORE YOUR SURGERY     1. Do not shave the area of your body where your surgery will be performed.  2. Shower and wash your hair and body as usual with your normal soap and shampoo.  3. Rinse your hair and body thoroughly after you shower to remove all soap residue.  4. With your hand, apply one packet of Hibiclens soap to the surgical site.   5. Wash the site gently for five (5) minutes. Do not scrub your skin too hard.   6. Do not wash with your regular soap after Hibiclens is used.  7. Rinse your body thoroughly.  8. Pat yourself dry with a clean, soft towel.  9. Do not use lotion, cream, or powder.  10. Wear clean clothes.    STEP #2: THE MORNING OF YOUR SURGERY     1. Repeat Step #1.    * Not to be used by people allergic to Chlorhexidine.          Anesthesia: General Anesthesia  Youre due to have surgery. During surgery, youll be given medication called anesthesia. (It is also called anesthetic.) This will keep you comfortable and pain-free. Your anesthesia provider will use general anesthesia. This sheet tells you more about it.  What is general anesthesia?     You are watched continuously during your procedure by the anesthesia provider   General anesthesia puts you into a state like deep sleep. It goes into the bloodstream (IV anesthetics), into the lungs (gas anesthetics), or both. You feel nothing during the procedure. You will not remember it. During the procedure, the anesthesia provider monitors you continuously. He or she checks your heart rate and rhythm, blood pressure, breathing, and blood oxygen.  · IV Anesthetics. IV anesthetics are given through an IV line in your arm. Theyre often given first. This is so you are asleep before a gas anesthetic is started. Some kinds of IV anesthetics relieve pain. Others relax you. Your doctor will decide which kind is best in your case.  · Gas  Anesthetics. Gas anesthetics are breathed into the lungs. They are often used to keep you asleep. They can be given through a facemask or a tube placed in your larynx or trachea (breathing tube).  ¨ If you have a facemask, your anesthesia provider will most likely place it over your nose and mouth while youre still awake. Youll breathe oxygen through the mask as your IV anesthetic is started. Gas anesthetic may be added through the mask.  ¨ If you have a tube in the larynx or trachea, it will be inserted into your throat after youre asleep.  Anesthesia tools and medications  You will likely have:  · IV anesthetics. These are put into an IV line into your bloodstream.  · Gas anesthetics. You breathe these anesthetics into your lungs, where they pass into your bloodstream.  · Pulse oximeter. This is a small clip that is attached to the end of your finger. This measures your blood oxygen level.  · Electrocardiography leads (electrodes). These are small sticky pads that are placed on your chest. They record your heart rate and rhythm.  · Blood pressure cuff. This reads your blood pressure.  Risks and possible complications  General anesthesia has some risks. These include:  · Breathing problems  · Nausea and vomiting  · Sore throat or hoarseness (usually temporary)  · Allergic reaction to the anesthetic  · Irregular heartbeat (rare)  · Cardiac arrest (rare)   Anesthesia safety  · Follow all instructions you are given for how long not to eat or drink before your procedure.  · Be sure your doctor knows what medications and drugs you take. This includes over-the-counter medications, herbs, supplements, alcohol or other drugs. You will be asked when those were last taken.  · Have an adult family member or friend drive you home after the procedure.  · For the first 24 hours after your surgery:  ¨ Do not drive or use heavy equipment.  ¨ Have a trusted family member or spouse make important decisions or sign  documents.  ¨ Avoid alcohol.  ¨ Have a responsible adult stay with you. He or she can watch for problems and help keep you safe.  Date Last Reviewed: 10/16/2014  © 5264-2327 [employer]. [address] [place] [address], [place] [address]. All rights reserved. This information is not intended as a substitute for professional medical care. Always follow your healthcare professional's instructions.            Admission Information     Date & Time Provider Department CSN    3/28/2017 10:30 AM Cuco Washington MD [facility] [id number]      Care Providers     Provider Role Specialty Primary office phone    Cuco Washington MD Attending Provider Urology [phone]      Recent Lab Values     No lab values to display.      Allergies as of 3/28/2017     No Known Allergies      Advance Directives     An advance directive is a document which, in the event you are no longer able to make decisions for yourself, tells your healthcare team what kind of treatment you do or do not want to receive, or who you would like to make those decisions for you.  If you do not currently have an advance directive, [facility] encourages you to create one.  For more information call:  [phone]), [phone] ([phone]),  or log on to [email or web address].        Language Assistance Services     ATTENTION: Language assistance services are available, free of charge. Please call [phone].      ATENCIÓN: Si habla español, tiene a rios disposición servicios gratuitos de asistencia lingüística. Llame al [phone].     [facility] Ý: N?u b?n nói Ti?ng Vi?t, có các d?ch v? h? tr? ngôn ng? mi?n phí dành cho b?n. G?i s? [phone].        [facility] Sign-Up     Activating your [facility] account is as easy as 1-2-3!     1) Visit [email or web address], select Sign Up Now, enter this activation code and your date of birth, then select Next.  Activation code not generated  Current Patient Portal Status: Account  disabled      2) Create a username and password to use when you visit [facility] in the future and select a security question in case you lose your password and select Next.    3) Enter your e-mail address and click Sign Up!    Additional Information  If you have questions, please e-mail [email or web address] or call [phone] to talk to our [facility] staff. Remember, [facility] is NOT to be used for urgent needs. For medical emergencies, dial 911.          [facility] complies with applicable Federal civil rights laws and does not discriminate on the basis of race, color, national origin, age, disability, or sex.

## 2017-03-28 NOTE — DISCHARGE INSTRUCTIONS
Your surgery is scheduled for 3/29/17.    Please report to Outpatient Surgery Intake Office on the 2nd FLOOR at 7:45a.m.          INSTRUCTIONS IMPORTANT!!!  ¨ Do not eat or drink after 12 midnight-including water. OK to brush teeth, no   gum, candy or mints!    ¨ Take only these medicines with a small swallow of water-morning of surgery: Atenolol          ____  No powder, lotions or creams to surgical area.  ____  Please remove all jewelry, including piercings and leave at home.  ____  No money or valuables needed. Please leave at home.  ____  Please bring any documents given by your doctor.  ____  If going home the same day, arrange for a ride home. You will not be able to             drive if Anesthesia was used.  ____  Wear loose fitting clothing. Allow for dressings, bandages.  ____  Stop Aspirin, Ibuprofen, Motrin and Aleve at least 3-5 days before surgery, unless otherwise instructed by your doctor, or the nurse.   You MAY use Tylenol/acetaminophen until day of surgery.  ____  If you take diabetic medication, do not take am of surgery unless instructed by Doctor.  ____  Call MD for temperature above 101 degrees.  ____ Stop taking any Fish Oil supplement or any Vitamins that contain Vitamin E at least 5 days prior to surgery.  ____ Do Not wear your contact lenses the day of your procedure.  You may wear your glasses.        I have read or had read and explained to me, and understand the above information.  Additional comments or instructions:  For additional questions call [phone]       Gargle with Listerine twice a day for 2 days prior to surgery, including the morning of surgery.      Pre-Op Bathing Instructions    Before surgery, you can play an important role in your own health.    Because skin is not sterile, we need to be sure that your skin is as free of germs as possible. By following the instructions below, you can reduce the number of germs on your skin before surgery.    IMPORTANT: You will need to  shower with a special soap called Hibiclens*, available at any pharmacy.  If you are allergic to Chlorhexidine (the antiseptic in Hibiclens), use an antibacterial soap such as Dial Soap for your preoperative shower.  You will shower with Hibiclens both the night before your surgery and the morning of your surgery.  Do not use Hibiclens on the head, face or genitals to avoid injury to those areas.    STEP #1: THE NIGHT BEFORE YOUR SURGERY     1. Do not shave the area of your body where your surgery will be performed.  2. Shower and wash your hair and body as usual with your normal soap and shampoo.  3. Rinse your hair and body thoroughly after you shower to remove all soap residue.  4. With your hand, apply one packet of Hibiclens soap to the surgical site.   5. Wash the site gently for five (5) minutes. Do not scrub your skin too hard.   6. Do not wash with your regular soap after Hibiclens is used.  7. Rinse your body thoroughly.  8. Pat yourself dry with a clean, soft towel.  9. Do not use lotion, cream, or powder.  10. Wear clean clothes.    STEP #2: THE MORNING OF YOUR SURGERY     1. Repeat Step #1.    * Not to be used by people allergic to Chlorhexidine.          Anesthesia: General Anesthesia  Youre due to have surgery. During surgery, youll be given medication called anesthesia. (It is also called anesthetic.) This will keep you comfortable and pain-free. Your anesthesia provider will use general anesthesia. This sheet tells you more about it.  What is general anesthesia?     You are watched continuously during your procedure by the anesthesia provider   General anesthesia puts you into a state like deep sleep. It goes into the bloodstream (IV anesthetics), into the lungs (gas anesthetics), or both. You feel nothing during the procedure. You will not remember it. During the procedure, the anesthesia provider monitors you continuously. He or she checks your heart rate and rhythm, blood pressure, breathing,  and blood oxygen.  · IV Anesthetics. IV anesthetics are given through an IV line in your arm. Theyre often given first. This is so you are asleep before a gas anesthetic is started. Some kinds of IV anesthetics relieve pain. Others relax you. Your doctor will decide which kind is best in your case.  · Gas Anesthetics. Gas anesthetics are breathed into the lungs. They are often used to keep you asleep. They can be given through a facemask or a tube placed in your larynx or trachea (breathing tube).  ¨ If you have a facemask, your anesthesia provider will most likely place it over your nose and mouth while youre still awake. Youll breathe oxygen through the mask as your IV anesthetic is started. Gas anesthetic may be added through the mask.  ¨ If you have a tube in the larynx or trachea, it will be inserted into your throat after youre asleep.  Anesthesia tools and medications  You will likely have:  · IV anesthetics. These are put into an IV line into your bloodstream.  · Gas anesthetics. You breathe these anesthetics into your lungs, where they pass into your bloodstream.  · Pulse oximeter. This is a small clip that is attached to the end of your finger. This measures your blood oxygen level.  · Electrocardiography leads (electrodes). These are small sticky pads that are placed on your chest. They record your heart rate and rhythm.  · Blood pressure cuff. This reads your blood pressure.  Risks and possible complications  General anesthesia has some risks. These include:  · Breathing problems  · Nausea and vomiting  · Sore throat or hoarseness (usually temporary)  · Allergic reaction to the anesthetic  · Irregular heartbeat (rare)  · Cardiac arrest (rare)   Anesthesia safety  · Follow all instructions you are given for how long not to eat or drink before your procedure.  · Be sure your doctor knows what medications and drugs you take. This includes over-the-counter medications, herbs, supplements, alcohol or  other drugs. You will be asked when those were last taken.  · Have an adult family member or friend drive you home after the procedure.  · For the first 24 hours after your surgery:  ¨ Do not drive or use heavy equipment.  ¨ Have a trusted family member or spouse make important decisions or sign documents.  ¨ Avoid alcohol.  ¨ Have a responsible adult stay with you. He or she can watch for problems and help keep you safe.  Date Last Reviewed: 10/16/2014  © 6518-2283 [employer]. [address] [place] [address], [place] [address]. All rights reserved. This information is not intended as a substitute for professional medical care. Always follow your healthcare professional's instructions.

## 2017-03-29 ENCOUNTER — HOSPITAL ENCOUNTER (OUTPATIENT)
Facility: HOSPITAL | Age: 27
Discharge: HOME | End: 2017-03-29
Attending: UROLOGY | Admitting: UROLOGY
Payer: MEDICAID

## 2017-03-29 ENCOUNTER — SURGERY (OUTPATIENT)
Age: 27
End: 2017-03-29

## 2017-03-29 ENCOUNTER — ANESTHESIA (OUTPATIENT)
Dept: SURGERY | Facility: HOSPITAL | Age: 27
End: 2017-03-29
Payer: MEDICAID

## 2017-03-29 DIAGNOSIS — N20.0 NEPHROLITHIASIS: ICD-10-CM

## 2017-03-29 PROCEDURE — 36000707: Performed by: UROLOGY

## 2017-03-29 PROCEDURE — 71000016 HC POSTOP RECOV ADDL HR: Performed by: UROLOGY

## 2017-03-29 PROCEDURE — C1894 INTRO/SHEATH, NON-LASER: HCPCS | Performed by: UROLOGY

## 2017-03-29 PROCEDURE — 63600175 PHARM REV CODE 636 W HCPCS: Performed by: NURSE ANESTHETIST, CERTIFIED REGISTERED

## 2017-03-29 PROCEDURE — 71000033 HC RECOVERY, INTIAL HOUR: Performed by: UROLOGY

## 2017-03-29 PROCEDURE — 74420 UROGRAPHY RTRGR +-KUB: CPT | Mod: 26,,, | Performed by: UROLOGY

## 2017-03-29 PROCEDURE — 63600175 PHARM REV CODE 636 W HCPCS: Performed by: ANESTHESIOLOGY

## 2017-03-29 PROCEDURE — 37000008 HC ANESTHESIA 1ST 15 MINUTES: Performed by: UROLOGY

## 2017-03-29 PROCEDURE — 27201423 OPTIME MED/SURG SUP & DEVICES STERILE SUPPLY: Performed by: UROLOGY

## 2017-03-29 PROCEDURE — 25500020 PHARM REV CODE 255: Performed by: UROLOGY

## 2017-03-29 PROCEDURE — 25000003 PHARM REV CODE 250: Performed by: UROLOGY

## 2017-03-29 PROCEDURE — 82370 X-RAY ASSAY CALCULUS: CPT

## 2017-03-29 PROCEDURE — 25000003 PHARM REV CODE 250: Performed by: NURSE ANESTHETIST, CERTIFIED REGISTERED

## 2017-03-29 PROCEDURE — 36000706: Performed by: UROLOGY

## 2017-03-29 PROCEDURE — 37000009 HC ANESTHESIA EA ADD 15 MINS: Performed by: UROLOGY

## 2017-03-29 PROCEDURE — 25000003 PHARM REV CODE 250: Performed by: NURSE PRACTITIONER

## 2017-03-29 PROCEDURE — 63600175 PHARM REV CODE 636 W HCPCS: Performed by: UROLOGY

## 2017-03-29 PROCEDURE — 71000015 HC POSTOP RECOV 1ST HR: Performed by: UROLOGY

## 2017-03-29 PROCEDURE — C1769 GUIDE WIRE: HCPCS | Performed by: UROLOGY

## 2017-03-29 PROCEDURE — C2617 STENT, NON-COR, TEM W/O DEL: HCPCS | Performed by: UROLOGY

## 2017-03-29 PROCEDURE — 52356 CYSTO/URETERO W/LITHOTRIPSY: CPT | Mod: RT,,, | Performed by: UROLOGY

## 2017-03-29 PROCEDURE — 76000 FLUOROSCOPY <1 HR PHYS/QHP: CPT | Mod: 26,59,, | Performed by: UROLOGY

## 2017-03-29 PROCEDURE — C1758 CATHETER, URETERAL: HCPCS | Performed by: UROLOGY

## 2017-03-29 PROCEDURE — 71000039 HC RECOVERY, EACH ADD'L HOUR: Performed by: UROLOGY

## 2017-03-29 DEVICE — STENT SET URETERAL 6X26CM: Type: IMPLANTABLE DEVICE | Site: URETER | Status: FUNCTIONAL

## 2017-03-29 RX ORDER — MIDAZOLAM HYDROCHLORIDE 1 MG/ML
INJECTION, SOLUTION INTRAMUSCULAR; INTRAVENOUS
Status: DISCONTINUED | OUTPATIENT
Start: 2017-03-29 | End: 2017-03-29

## 2017-03-29 RX ORDER — LIDOCAINE HYDROCHLORIDE 10 MG/ML
1 INJECTION, SOLUTION EPIDURAL; INFILTRATION; INTRACAUDAL; PERINEURAL ONCE
Status: DISCONTINUED | OUTPATIENT
Start: 2017-03-29 | End: 2017-03-29 | Stop reason: HOSPADM

## 2017-03-29 RX ORDER — CIPROFLOXACIN 2 MG/ML
400 INJECTION, SOLUTION INTRAVENOUS
Status: COMPLETED | OUTPATIENT
Start: 2017-03-29 | End: 2017-03-29

## 2017-03-29 RX ORDER — OXYCODONE AND ACETAMINOPHEN 5; 325 MG/1; MG/1
1 TABLET ORAL EVERY 4 HOURS PRN
Qty: 50 TABLET | Refills: 0 | Status: SHIPPED | OUTPATIENT
Start: 2017-03-29 | End: 2017-04-08

## 2017-03-29 RX ORDER — DEXAMETHASONE SODIUM PHOSPHATE 4 MG/ML
INJECTION, SOLUTION INTRA-ARTICULAR; INTRALESIONAL; INTRAMUSCULAR; INTRAVENOUS; SOFT TISSUE
Status: DISCONTINUED | OUTPATIENT
Start: 2017-03-29 | End: 2017-03-29

## 2017-03-29 RX ORDER — ONDANSETRON 2 MG/ML
INJECTION INTRAMUSCULAR; INTRAVENOUS
Status: DISCONTINUED | OUTPATIENT
Start: 2017-03-29 | End: 2017-03-29

## 2017-03-29 RX ORDER — SODIUM CHLORIDE, SODIUM LACTATE, POTASSIUM CHLORIDE, CALCIUM CHLORIDE 600; 310; 30; 20 MG/100ML; MG/100ML; MG/100ML; MG/100ML
INJECTION, SOLUTION INTRAVENOUS CONTINUOUS
Status: DISCONTINUED | OUTPATIENT
Start: 2017-03-29 | End: 2017-03-29 | Stop reason: HOSPADM

## 2017-03-29 RX ORDER — ONDANSETRON 2 MG/ML
4 INJECTION INTRAMUSCULAR; INTRAVENOUS DAILY PRN
Status: DISCONTINUED | OUTPATIENT
Start: 2017-03-29 | End: 2017-03-29 | Stop reason: HOSPADM

## 2017-03-29 RX ORDER — LIDOCAINE HCL/PF 100 MG/5ML
SYRINGE (ML) INTRAVENOUS
Status: DISCONTINUED | OUTPATIENT
Start: 2017-03-29 | End: 2017-03-29

## 2017-03-29 RX ORDER — OXYCODONE AND ACETAMINOPHEN 5; 325 MG/1; MG/1
1 TABLET ORAL
Status: DISCONTINUED | OUTPATIENT
Start: 2017-03-29 | End: 2017-03-29 | Stop reason: HOSPADM

## 2017-03-29 RX ORDER — SODIUM CHLORIDE 0.9 % (FLUSH) 0.9 %
3 SYRINGE (ML) INJECTION EVERY 8 HOURS
Status: DISCONTINUED | OUTPATIENT
Start: 2017-03-29 | End: 2017-03-29 | Stop reason: HOSPADM

## 2017-03-29 RX ORDER — SODIUM CHLORIDE 0.9 % (FLUSH) 0.9 %
3 SYRINGE (ML) INJECTION
Status: DISCONTINUED | OUTPATIENT
Start: 2017-03-29 | End: 2017-03-29 | Stop reason: HOSPADM

## 2017-03-29 RX ORDER — ONDANSETRON 2 MG/ML
4 INJECTION INTRAMUSCULAR; INTRAVENOUS EVERY 12 HOURS PRN
Status: DISCONTINUED | OUTPATIENT
Start: 2017-03-29 | End: 2017-03-29 | Stop reason: HOSPADM

## 2017-03-29 RX ORDER — SUCCINYLCHOLINE CHLORIDE 20 MG/ML
INJECTION INTRAMUSCULAR; INTRAVENOUS
Status: DISCONTINUED | OUTPATIENT
Start: 2017-03-29 | End: 2017-03-29

## 2017-03-29 RX ORDER — SODIUM CHLORIDE 9 MG/ML
INJECTION, SOLUTION INTRAVENOUS CONTINUOUS
Status: DISCONTINUED | OUTPATIENT
Start: 2017-03-29 | End: 2017-03-29 | Stop reason: HOSPADM

## 2017-03-29 RX ORDER — FENTANYL CITRATE 50 UG/ML
INJECTION, SOLUTION INTRAMUSCULAR; INTRAVENOUS
Status: DISCONTINUED | OUTPATIENT
Start: 2017-03-29 | End: 2017-03-29

## 2017-03-29 RX ORDER — HYDROMORPHONE HYDROCHLORIDE 2 MG/ML
0.4 INJECTION, SOLUTION INTRAMUSCULAR; INTRAVENOUS; SUBCUTANEOUS EVERY 5 MIN PRN
Status: DISCONTINUED | OUTPATIENT
Start: 2017-03-29 | End: 2017-03-29 | Stop reason: HOSPADM

## 2017-03-29 RX ORDER — HYDROMORPHONE HYDROCHLORIDE 2 MG/ML
INJECTION, SOLUTION INTRAMUSCULAR; INTRAVENOUS; SUBCUTANEOUS
Status: DISCONTINUED | OUTPATIENT
Start: 2017-03-29 | End: 2017-03-29

## 2017-03-29 RX ORDER — PROPOFOL 10 MG/ML
VIAL (ML) INTRAVENOUS
Status: DISCONTINUED | OUTPATIENT
Start: 2017-03-29 | End: 2017-03-29

## 2017-03-29 RX ORDER — HYDROCODONE BITARTRATE AND ACETAMINOPHEN 5; 325 MG/1; MG/1
1 TABLET ORAL EVERY 4 HOURS PRN
Status: DISCONTINUED | OUTPATIENT
Start: 2017-03-29 | End: 2017-03-29 | Stop reason: HOSPADM

## 2017-03-29 RX ADMIN — HYDROMORPHONE HYDROCHLORIDE 0.4 MG: 2 INJECTION, SOLUTION INTRAMUSCULAR; INTRAVENOUS; SUBCUTANEOUS at 02:03

## 2017-03-29 RX ADMIN — CIPROFLOXACIN 400 MG: 2 INJECTION, SOLUTION INTRAVENOUS at 12:03

## 2017-03-29 RX ADMIN — FENTANYL CITRATE 50 MCG: 50 INJECTION, SOLUTION INTRAMUSCULAR; INTRAVENOUS at 12:03

## 2017-03-29 RX ADMIN — HYDROMORPHONE HYDROCHLORIDE 0.6 MG: 2 INJECTION INTRAMUSCULAR; INTRAVENOUS; SUBCUTANEOUS at 12:03

## 2017-03-29 RX ADMIN — DEXAMETHASONE SODIUM PHOSPHATE 4 MG: 4 INJECTION, SOLUTION INTRAMUSCULAR; INTRAVENOUS at 12:03

## 2017-03-29 RX ADMIN — HYDROCODONE BITARTRATE AND ACETAMINOPHEN 1 TABLET: 5; 325 TABLET ORAL at 02:03

## 2017-03-29 RX ADMIN — FENTANYL CITRATE 150 MCG: 50 INJECTION, SOLUTION INTRAMUSCULAR; INTRAVENOUS at 12:03

## 2017-03-29 RX ADMIN — PROPOFOL 300 MG: 10 INJECTION, EMULSION INTRAVENOUS at 12:03

## 2017-03-29 RX ADMIN — SUCCINYLCHOLINE CHLORIDE 120 MG: 20 INJECTION, SOLUTION INTRAMUSCULAR; INTRAVENOUS at 12:03

## 2017-03-29 RX ADMIN — MIDAZOLAM 2 MG: 1 INJECTION INTRAMUSCULAR; INTRAVENOUS at 11:03

## 2017-03-29 RX ADMIN — LIDOCAINE HYDROCHLORIDE 100 MG: 20 INJECTION, SOLUTION INTRAVENOUS at 12:03

## 2017-03-29 RX ADMIN — IOHEXOL 100 ML: 300 INJECTION, SOLUTION INTRAVENOUS at 12:03

## 2017-03-29 RX ADMIN — FENTANYL CITRATE 50 MCG: 50 INJECTION, SOLUTION INTRAMUSCULAR; INTRAVENOUS at 11:03

## 2017-03-29 RX ADMIN — SODIUM CHLORIDE, SODIUM LACTATE, POTASSIUM CHLORIDE, AND CALCIUM CHLORIDE: .6; .31; .03; .02 INJECTION, SOLUTION INTRAVENOUS at 08:03

## 2017-03-29 RX ADMIN — ONDANSETRON 4 MG: 2 INJECTION, SOLUTION INTRAMUSCULAR; INTRAVENOUS at 12:03

## 2017-03-29 NOTE — OP NOTE
[facility]  Operative Note    Date: 03/29/2017    Pre-Op Diagnosis: right renal stones    Post-Op Diagnosis: same    Procedure(s) Performed:   1.  Right ureteroscopy with laser lithotripsy and stone basket extraction  2.  Cystoscopy with right retrograde pyelogram  3.  Right ureteral stent placement  4.  Fluoro < 1 hr    Specimen(s): right renal stones    Staff Surgeon:  Cuco Washington MD    Assistant Surgeon: none    Anesthesia: General endotracheal anesthesia    Indications: Emmanuel Ervin Jr. is a 26 y.o. male with a right renal stone, presenting for definitive stone management.  He currently does not have a JJ ureteral stent in place.      Findings: right renal stones in the lower pole.  Difficult position with shallow calyx and unfavorable position in the lower pole.  However, stones were removed after fragmentation.    1 baskets were used throughout the case.      Estimated Blood Loss: min    Drains: 6 Fr x 26 cm JJ ureteral stent without strings    Procedure in detail:  After informed consent was obtained, the patient was brought the the cystoscopy suite and placed in the supine position.  SCDs were applied and working.  Anesthesia was administered.  The patient was then placed in the dorsal lithotomy position and prepped and draped in the usual sterile fashion.      A rigid cystoscope in a 22 Fr sheath was introduced into the patient's urethra.  This passed easily.  The entire urethra was visualized which showed no strictures or masses.  Formal cystoscopy was performed which revealed no masses or lesions suspicious for malignancy, no bladder stones, no bladder diverticuli, no trabeculations.  The ureteral orifices were visualized in the normal anatomic position bilaterally and clear efflux was visualized.      A motion wire was passed up the right ureteral orifice and up into the kidney.  This passed easily and placement was confirmed using fluoro.  A second amplatz wire was then passed  up the right ureteral orifice again confirmed using fluoro.  The cystoscope was removed keeping the guidewires in place and one of the wires was secured to the drape.      A ureteral access sheath was then passed over the free wire under fluoroscopic guidance.  Subsequently, the flexible ureteroscope was passed into the patient's ureter through the access sheath under direct vision. A stone was encountered at the level of the lower pole.  A 273 micron laser fiber was passed through the ureteroscope.  The stone was fragmented using the laser.  The laser fiber was removed and a Nitinol tipless basket was introduced through the ureteroscope.  Stone fragments were removed and collected for specimen analysis.  A retrograde pyelogram was performed through the ureteroscope.  There were no filling defects noted and the renal pelvis was opacified.  The ureteroscope was removed keeping the motion wire in place.  The entire course of the ureter was visualized as the ureteroscope and access sheath were simultaneously removed.  There were no significant ureteral fragments left behind.     A 6 Fr x 26 cm JJ ureteral stent without strings was passed over the wire and up into the renal pelvis using fluoro.  When the coil appeared to be in good position in the kidney and the radio-opaque marker of the pusher was at the inferior pubis, the wire was removed under continuous fluoro.  Good coils were seen in the kidney and the bladder using fluoro.      The patient tolerated the procedure well and was transferred to the recovery room in stable condition.      Disposition:  The patient will follow up with me in 1 week in the OR for stent removal.      Cuco Washington MD

## 2017-03-29 NOTE — IP AVS SNAPSHOT
[facility]  [address]  [place] [address]  Phone: [phone]           Patient Discharge Instructions   Our goal is to set you up for success. This packet includes information on your condition, medications, and your home care.  It will help you care for yourself to prevent having to return to the hospital.     Please ask your nurse if you have any questions.      There are many details to remember when preparing to leave the hospital. Here is what you will need to do:    1. Take your medicine. If you are prescribed medications, review your Medication List on the following pages. You may have new medications to  at the pharmacy and others that you'll need to stop taking. Review the instructions for how and when to take your medications. Talk with your doctor or nurses if you are unsure of what to do.     2. Go to your follow-up appointments. Specific follow-up information is listed in the following pages. Your may be contacted by a nurse or clinical provider about future appointments. Be sure we have all of the phone numbers to reach you. Please contact your provider's office if you are unable to make an appointment.     3. Watch for warning signs. Your doctor or nurse will give you detailed warning signs to watch for and when to call for assistance. These instructions may also include educational information about your condition. If you experience any of warning signs to your health, call your doctor.               ** Verify the list of medication(s) below is accurate and up to date. Carry this with you in case of emergency. If your medications have changed, please notify your healthcare provider.             Medication List      START taking these medications        Additional Info                      oxycodone-acetaminophen 5-325 mg per tablet   Commonly known as:  PERCOCET   Quantity:  50 tablet   Refills:  0   Dose:  1 tablet    Instructions:  Take 1 tablet by mouth every 4  (four) hours as needed for Pain.     Begin Date    AM    Noon    PM    Bedtime         CONTINUE taking these medications        Additional Info                      alprazolam 1 MG tablet   Commonly known as:  XANAX   Refills:  0   Dose:  1 tablet    Instructions:  Take 1 tablet by mouth 2 (two) times daily.     Begin Date    AM    Noon    PM    Bedtime       atenolol 50 MG tablet   Commonly known as:  TENORMIN   Refills:  0   Dose:  50 mg    Instructions:  Take 50 mg by mouth once daily.     Begin Date    AM    Noon    PM    Bedtime       quetiapine 300 MG Tab   Commonly known as:  SEROQUEL   Refills:  0    Instructions:  Take by mouth.     Begin Date    AM    Noon    PM    Bedtime            Where to Get Your Medications      These medications were sent to [facility]/pharmacy #[id number] - [place], [place] - [address]  [address], [place] [address]     Phone:  [phone]     oxycodone-acetaminophen 5-325 mg per tablet                  Please bring to all follow up appointments:    1. A copy of your discharge instructions.  2. All medicines you are currently taking in their original bottles.  3. Identification and insurance card.    Please arrive 15 minutes ahead of scheduled appointment time.    Please call 24 hours in advance if you must reschedule your appointment and/or time.        Your Scheduled Appointments     May 11, 2017  1:30 PM CDT   New Patient with MD MARIBEL Ortiz II - Urology ([facility])    [address] [address] [phone]              Follow-up Information     Follow up with Cuco Washington MD In 1 week.    Specialty:  Urology    Why:  stent removal in the OR    Contact information:    [address]  [address]  [facility] [address] [phone]          Discharge Instructions     Future Orders    Activity as tolerated     Call MD for:  persistent nausea and vomiting     Call MD for:  severe uncontrolled pain     Call MD for:  temperature >100.4      Diet general     Questions:    Total calories:      Fat restriction, if any:      Protein restriction, if any:      Na restriction, if any:      Fluid restriction:      Additional restrictions:      No dressing needed         Discharge Instructions         Cystoscopy    Cystoscopy is a procedure that lets your doctor look directly inside your urethra and bladder. It can be used to:  · Help diagnose a problem with your urethra, bladder, or kidneys.  · Take a sample (biopsy) of bladder or urethral tissue.  · Treat certain problems (such as removing kidney stones).  · Place a stent to bypass an obstruction.  · Take special X-rays of the kidneys.  Based on the findings, your doctor may recommend other tests or treatments.    What is a cystoscope?  A cystoscope is a telescope-like instrument that contains lenses and fiberoptics (small glass wires that make bright light). The cystoscope may be straight and rigid, or flexible to bend around curves in the urethra. The doctor may look directly into the cystoscope, or project the image onto a monitor.  ·   The procedure    Cystoscopy is done in the doctors office or hospital. The doctor and a nurse are present during the procedure. It takes only a few minutes, longer if a biopsy, X-ray, or treatment needs to be done.  During the procedure:  · You lie on an exam table on your back, knees bent and legs apart. You are covered with a drape.  · Your urethra and the area around it are washed. Anesthetic jelly may be applied to numb the urethra. Other pain medication is usually not needed. In some cases, you may be offered a mild sedative to help you relax. If a more extensive procedure is to be done, such as a biopsy or kidney stone removal, general anesthesia may be needed.  · The cystoscope is inserted. A sterile fluid is put into the bladder to expand it. You may feel pressure from this fluid.  · When the procedure is done, the cystoscope is removed.  ·   After the  procedure    If you had a sedative, general anesthesia, or spinal anesthesia, you must have someone drive you home. Once youre home:  · Drink plenty of fluids.  · You may have burning or light bleeding when you urinate--this is normal.  · Medications may be prescribed to ease any discomfort or prevent infection. Take these as directed.  · Call your doctor if you have heavy bleeding or blood clots, burning that lasts more than a day, a fever over 100°F  (38° C), or trouble urinating.      Ureteral Stents  A ureteral stent is a soft plastic tube with holes in it. Its temporarily inserted into a ureter to help drain urine into the bladder. One end goes in the kidney. The other end goes in the bladder. A coil on each end holds the stent in place. The stent cant be seen from outside the body. It shouldnt interfere with your normal routine. Your stent will be put in by a urologist (doctor trained in treating the urinary tract) or another specialist. The procedure is done in a hospital or surgery center. Youll likely go home the same day.    When Is a Ureteral Stent Used?  A ureteral stent may be used:  · To bypass a blockage in a kidney or ureter.  · During kidney stone removal.  · To let a ureter heal after surgery.    Before the Procedure  Your doctor will give you instructions to prepare for the procedure. X-rays or other imaging tests of your kidneys and ureters may be done beforehand.    During the Procedure  · You receive medication to prevent pain and help you relax or sleep during the procedure. Once this takes effect, the procedure starts.  · The doctor inserts a cystoscope (lighted instrument) through the urethra and into the bladder. This shows the opening to the ureter.  · A thin wire is carefully threaded through the cystoscope, up the ureter, and into the kidney. The stent is inserted over the wire.  · A fluoroscope (special X-ray machine) is used to help position the stent. When the stent is in place,  the wire and cystoscope are removed.  ·   While You Have a Stent  · Some discomfort is normal. Certain movements may trigger pain or a feeling that you need to urinate. You may also feel mild soreness or pressure before or during urination. These symptoms will go away a few days after the stent is removed.  · Medication to control pain or bladder spasms or to prevent infection may be prescribed. Take this as directed.  · Drink plenty of fluids to help flush out your urinary tract.  · Your urine may be slightly pink or red. This is due to bleeding caused by minor irritation from the stent. This may happen on and off while you have the stent.  · As with any synthetic device placed in the body, there is a risk of infection. The stent may have to be removed if this happens.   ·   How Long Will You Need a Stent?  The stent is often taken out after the blockage in the ureter is treated or the ureter has healed. This may take 1 week to 2 weeks, or longer. If a stent is needed for a long time, it may need to be changed every few months.    Call Your Doctor  Contact your doctor right away if:  · Your urine contains blood clots or you see a large amount of blood-tinged urine.   · You have symptoms similar to those you had before the stent was placed.   · You constantly leak urine.  · You have a fever over 100.4°F (38°C), chills, nausea, or vomiting.  · Your pain is not relieved with medication.  · The end of the stent comes out of the urethra.     Notify Dr. Washington for any problems or concerns.  Drink Plenty of fluids.              Primary Diagnosis     Your primary diagnosis was:  Kidney Stone      Admission Information     Date & Time Provider Department CSN    3/29/2017  7:15 AM Cuco Washington MD [facility] [id number]      Care Providers     Provider Role Specialty Primary office phone    Cuco Washington MD Attending Provider Urology [phone]    Cuco Washington MD Surgeon  Urology  [phone]      Your Vitals Were     BP Pulse Resp SpO2          144/72 102 12 98%        Recent Lab Values     No lab values to display.      Pending Labs     Order Current Status    Urinary Stone Analysis In process      Allergies as of 3/29/2017     No Known Allergies      [facility]     [facility] Nurse Care Line - 24/7 Assistance  Unless otherwise directed by your provider, please contact [facility], our nurse care line that is available for 24/7 assistance.     Registered nurses in the [facility] provide clinical advisement, health education, appointment booking, and other advisory services.  Call for this free service at [phone].        Advance Directives     An advance directive is a document which, in the event you are no longer able to make decisions for yourself, tells your healthcare team what kind of treatment you do or do not want to receive, or who you would like to make those decisions for you.  If you do not currently have an advance directive, [facility] encourages you to create one.  For more information call:  [phone]), [phone] ([phone]),  or log on to [email or web address].        Language Assistance Services     ATTENTION: Language assistance services are available, free of charge. Please call [phone].      ATENCIÓN: Si habla español, tiene a rios disposición servicios gratuitos de asistencia lingüística. Llame al [phone].     CHÚ Ý: N?u b?n nói Ti?ng Vi?t, có các d?ch v? h? tr? ngôn ng? mi?n phí dành cho b?n. G?i s? [phone].        [facility] Sign-Up     Activating your [facility] account is as easy as 1-2-3!     1) Visit [email or web address], select Sign Up Now, enter this activation code and your date of birth, then select Next.  Activation code not generated  Current Patient Portal Status: Account disabled      2) Create a username and password to use when you visit [facility] in the future and select a security  question in case you lose your password and select Next.    3) Enter your e-mail address and click Sign Up!    Additional Information  If you have questions, please e-mail [email or web address] or call [phone] to talk to our [facility] staff. Remember, [facility] is NOT to be used for urgent needs. For medical emergencies, dial 911.          [facility] complies with applicable Federal civil rights laws and does not discriminate on the basis of race, color, national origin, age, disability, or sex.

## 2017-03-29 NOTE — TRANSFER OF CARE
Anesthesia Transfer of Care Note    Patient: Emmanuel Ervin Jr.    Procedure(s) Performed: Procedure(s) (LRB):  CYSTOSCOPY WITH STENT PLACEMENT (Right)  EXTRACTION-STONE-URETEROSCOPY (Right)  LASER holmium (Right)  PYELOGRAM-RETROGRADE (Right)    Patient location: PACU    Anesthesia Type: general    Transport from OR: Transported from OR on 6-10 L/min O2 by face mask with adequate spontaneous ventilation    Post pain: adequate analgesia    Post assessment: no apparent anesthetic complications and tolerated procedure well    Post vital signs: stable    Level of consciousness: awake, alert and oriented    Nausea/Vomiting: no nausea/vomiting    Complications: none          Last vitals: There were no vitals taken for this visit.

## 2017-03-29 NOTE — DISCHARGE SUMMARY
[facility]  Discharge Note  Short Stay    Admit Date: 3/29/2017    Discharge Date and Time: No discharge date for patient encounter.     Attending Physician: Cuco Washington MD     Discharge Provider: Cuco Washington    Diagnoses:  Active Hospital Problems    Diagnosis  POA    *Nephrolithiasis [N20.0]  Yes      Resolved Hospital Problems    Diagnosis Date Resolved POA   No resolved problems to display.       Discharged Condition: good    Hospital Course: Patient was admitted for an outpatient procedure and tolerated the procedure well with no complications.    Final Diagnoses: Same as principal problem.    Disposition: Home or Self Care    Follow up/Patient Instructions:    1 week for stent removal in the OR  Medications:  Reconciled Home Medications:   Current Discharge Medication List      START taking these medications    Details   oxycodone-acetaminophen (PERCOCET) 5-325 mg per tablet Take 1 tablet by mouth every 4 (four) hours as needed for Pain.  Qty: 50 tablet, Refills: 0         CONTINUE these medications which have NOT CHANGED    Details   alprazolam (XANAX) 1 MG tablet Take 1 tablet by mouth 2 (two) times daily.      atenolol (TENORMIN) 50 MG tablet Take 50 mg by mouth once daily.      quetiapine (SEROQUEL) 300 MG Tab Take by mouth.             Discharge Procedure Orders  Diet general     Activity as tolerated     Call MD for:  temperature >100.4     Call MD for:  persistent nausea and vomiting     Call MD for:  severe uncontrolled pain     No dressing needed       Follow-up Information     Follow up with Cuco Washington MD In 1 week.    Specialty:  Urology    Why:  stent removal in the OR    Contact information:    [address]  [address]  [place] [id number] [phone]            Discharge Procedure Orders (must include Diet, Follow-up, Activity):    Discharge Procedure Orders (must include Diet, Follow-up, Activity)  Diet general     Activity as tolerated     Call MD for:   temperature >100.4     Call MD for:  persistent nausea and vomiting     Call MD for:  severe uncontrolled pain     No dressing needed

## 2017-03-29 NOTE — PLAN OF CARE
Criteria met for discharge. IV removed,discharge instructions explained,copy given, RX sent electronically to pharmacy per physician. Tolerating po well, voiding without difficulty, urine is pink colored. Denies pain. Pt and wife both verbalize understanding of instructions.has no further questions. Discharged home with family in good condition.

## 2017-03-29 NOTE — DISCHARGE INSTRUCTIONS
Cystoscopy    Cystoscopy is a procedure that lets your doctor look directly inside your urethra and bladder. It can be used to:  · Help diagnose a problem with your urethra, bladder, or kidneys.  · Take a sample (biopsy) of bladder or urethral tissue.  · Treat certain problems (such as removing kidney stones).  · Place a stent to bypass an obstruction.  · Take special X-rays of the kidneys.  Based on the findings, your doctor may recommend other tests or treatments.    What is a cystoscope?  A cystoscope is a telescope-like instrument that contains lenses and fiberoptics (small glass wires that make bright light). The cystoscope may be straight and rigid, or flexible to bend around curves in the urethra. The doctor may look directly into the cystoscope, or project the image onto a monitor.  ·   The procedure    Cystoscopy is done in the doctors office or hospital. The doctor and a nurse are present during the procedure. It takes only a few minutes, longer if a biopsy, X-ray, or treatment needs to be done.  During the procedure:  · You lie on an exam table on your back, knees bent and legs apart. You are covered with a drape.  · Your urethra and the area around it are washed. Anesthetic jelly may be applied to numb the urethra. Other pain medication is usually not needed. In some cases, you may be offered a mild sedative to help you relax. If a more extensive procedure is to be done, such as a biopsy or kidney stone removal, general anesthesia may be needed.  · The cystoscope is inserted. A sterile fluid is put into the bladder to expand it. You may feel pressure from this fluid.  · When the procedure is done, the cystoscope is removed.  ·   After the procedure    If you had a sedative, general anesthesia, or spinal anesthesia, you must have someone drive you home. Once youre home:  · Drink plenty of fluids.  · You may have burning or light bleeding when you urinate--this is normal.  · Medications may be  prescribed to ease any discomfort or prevent infection. Take these as directed.  · Call your doctor if you have heavy bleeding or blood clots, burning that lasts more than a day, a fever over 100°F  (38° C), or trouble urinating.      Ureteral Stents  A ureteral stent is a soft plastic tube with holes in it. Its temporarily inserted into a ureter to help drain urine into the bladder. One end goes in the kidney. The other end goes in the bladder. A coil on each end holds the stent in place. The stent cant be seen from outside the body. It shouldnt interfere with your normal routine. Your stent will be put in by a urologist (doctor trained in treating the urinary tract) or another specialist. The procedure is done in a hospital or surgery center. Youll likely go home the same day.    When Is a Ureteral Stent Used?  A ureteral stent may be used:  · To bypass a blockage in a kidney or ureter.  · During kidney stone removal.  · To let a ureter heal after surgery.    Before the Procedure  Your doctor will give you instructions to prepare for the procedure. X-rays or other imaging tests of your kidneys and ureters may be done beforehand.    During the Procedure  · You receive medication to prevent pain and help you relax or sleep during the procedure. Once this takes effect, the procedure starts.  · The doctor inserts a cystoscope (lighted instrument) through the urethra and into the bladder. This shows the opening to the ureter.  · A thin wire is carefully threaded through the cystoscope, up the ureter, and into the kidney. The stent is inserted over the wire.  · A fluoroscope (special X-ray machine) is used to help position the stent. When the stent is in place, the wire and cystoscope are removed.  ·   While You Have a Stent  · Some discomfort is normal. Certain movements may trigger pain or a feeling that you need to urinate. You may also feel mild soreness or pressure before or during urination. These symptoms  will go away a few days after the stent is removed.  · Medication to control pain or bladder spasms or to prevent infection may be prescribed. Take this as directed.  · Drink plenty of fluids to help flush out your urinary tract.  · Your urine may be slightly pink or red. This is due to bleeding caused by minor irritation from the stent. This may happen on and off while you have the stent.  · As with any synthetic device placed in the body, there is a risk of infection. The stent may have to be removed if this happens.   ·   How Long Will You Need a Stent?  The stent is often taken out after the blockage in the ureter is treated or the ureter has healed. This may take 1 week to 2 weeks, or longer. If a stent is needed for a long time, it may need to be changed every few months.    Call Your Doctor  Contact your doctor right away if:  · Your urine contains blood clots or you see a large amount of blood-tinged urine.   · You have symptoms similar to those you had before the stent was placed.   · You constantly leak urine.  · You have a fever over 100.4°F (38°C), chills, nausea, or vomiting.  · Your pain is not relieved with medication.  · The end of the stent comes out of the urethra.     Notify Dr. Washington for any problems or concerns.  Drink Plenty of fluids.

## 2017-03-30 VITALS
OXYGEN SATURATION: 97 % | DIASTOLIC BLOOD PRESSURE: 69 MMHG | SYSTOLIC BLOOD PRESSURE: 141 MMHG | HEART RATE: 102 BPM | TEMPERATURE: 98 F | RESPIRATION RATE: 18 BRPM

## 2017-03-30 NOTE — ANESTHESIA POSTPROCEDURE EVALUATION
Anesthesia Post Evaluation    Patient: Emmanuel Ervin Jr.    Procedure(s) Performed: Procedure(s) (LRB):  CYSTOSCOPY WITH STENT PLACEMENT (Right)  EXTRACTION-STONE-URETEROSCOPY (Right)  LASER holmium (Right)  PYELOGRAM-RETROGRADE (Right)    Final Anesthesia Type: general  Patient location during evaluation: PACU  Patient participation: Yes- Able to Participate  Level of consciousness: awake and alert  Post-procedure vital signs: reviewed and stable  Pain management: adequate  Airway patency: patent  PONV status at discharge: No PONV  Anesthetic complications: no      Cardiovascular status: hemodynamically stable  Respiratory status: unassisted  Hydration status: euvolemic  Follow-up not needed.        Visit Vitals    BP (!) 141/69 (BP Location: Right arm, Patient Position: Lying, BP Method: Automatic)    Pulse 102    Temp 36.6 °C (97.8 °F) (Oral)    Resp 18    SpO2 97%       Pain/Emperatriz Score: Pain Assessment Performed: Yes (3/29/2017  4:00 PM)  Presence of Pain: denies (3/29/2017  4:00 PM)  Pain Rating Prior to Med Admin: 4 (3/29/2017  2:30 PM)  Emperatriz Score: 10 (3/29/2017  4:00 PM)

## 2017-03-30 NOTE — ANESTHESIA RELEASE NOTE
Anesthesia Release from PACU Note    Patient: Emmanuel Ervin Jr.    Procedure(s) Performed: Procedure(s) (LRB):  CYSTOSCOPY WITH STENT PLACEMENT (Right)  EXTRACTION-STONE-URETEROSCOPY (Right)  LASER holmium (Right)  PYELOGRAM-RETROGRADE (Right)    Anesthesia type: general    Post pain: Adequate analgesia    Post assessment: no apparent anesthetic complications, tolerated procedure well and no evidence of recall    Last Vitals:   Visit Vitals    BP (!) 141/69 (BP Location: Right arm, Patient Position: Lying, BP Method: Automatic)    Pulse 102    Temp 36.6 °C (97.8 °F) (Oral)    Resp 18    SpO2 97%       Post vital signs: stable    Level of consciousness: awake, alert  and oriented    Nausea/Vomiting: no nausea/no vomiting    Complications: none    Airway Patency: patent    Respiratory: unassisted    Cardiovascular: stable and blood pressure at baseline    Hydration: euvolemic

## 2017-04-03 ENCOUNTER — TELEPHONE (OUTPATIENT)
Dept: UROLOGY | Facility: CLINIC | Age: 27
End: 2017-04-03

## 2017-04-03 DIAGNOSIS — N20.0 KIDNEY STONE: Primary | ICD-10-CM

## 2017-04-03 NOTE — TELEPHONE ENCOUNTER
----- Message from Maite Shaw sent at 3/31/2017  1:35 PM CDT -----  Contact: [phone]  Pt would like to schedule a Stent removal. Please advise.

## 2017-04-03 NOTE — TELEPHONE ENCOUNTER
----- Message from Maite Shaw sent at 3/31/2017  1:35 PM CDT -----  Contact: [phone]  Pt would like to schedule a Stent removal. Please advise.

## 2017-04-04 ENCOUNTER — TELEPHONE (OUTPATIENT)
Dept: UROLOGY | Facility: CLINIC | Age: 27
End: 2017-04-04

## 2017-04-04 RX ORDER — PHENAZOPYRIDINE HYDROCHLORIDE 200 MG/1
200 TABLET, FILM COATED ORAL 3 TIMES DAILY PRN
Qty: 9 TABLET | Refills: 1 | Status: SHIPPED | OUTPATIENT
Start: 2017-04-04 | End: 2017-04-07

## 2017-04-04 NOTE — TELEPHONE ENCOUNTER
----- Message from Saloni Presley sent at 4/4/2017 10:23 AM CDT -----  Contact: amaya/wife/[phone]  He like to get an rx for phenazopyridine 200mg; this helps with the pain when he urinates with the stent.

## 2017-04-04 NOTE — TELEPHONE ENCOUNTER
----- Message from Saloni Presley sent at 4/4/2017  2:36 PM CDT -----  Contact: [facility]/[phone]  Please call asap phenazopyridine (PYRIDIUM) 200 MG tablet possible drug interaction.

## 2017-04-04 NOTE — TELEPHONE ENCOUNTER
The had his profile flagged as having kidney disease, he only has kidney stones are they changed his profile to reflect that and his warning came off.

## 2017-04-10 LAB — URINARY STONE ANALYSIS: NORMAL

## 2017-04-12 ENCOUNTER — ANESTHESIA EVENT (OUTPATIENT)
Dept: SURGERY | Facility: HOSPITAL | Age: 27
End: 2017-04-12
Payer: MEDICAID

## 2017-04-12 ENCOUNTER — ANESTHESIA (OUTPATIENT)
Dept: SURGERY | Facility: HOSPITAL | Age: 27
End: 2017-04-12
Payer: MEDICAID

## 2017-04-12 ENCOUNTER — HOSPITAL ENCOUNTER (OUTPATIENT)
Facility: HOSPITAL | Age: 27
Discharge: HOME | End: 2017-04-12
Attending: UROLOGY | Admitting: UROLOGY
Payer: MEDICAID

## 2017-04-12 ENCOUNTER — SURGERY (OUTPATIENT)
Age: 27
End: 2017-04-12

## 2017-04-12 DIAGNOSIS — N20.0 NEPHROLITHIASIS: ICD-10-CM

## 2017-04-12 PROCEDURE — 71000015 HC POSTOP RECOV 1ST HR: Performed by: UROLOGY

## 2017-04-12 PROCEDURE — 52310 CYSTOSCOPY AND TREATMENT: CPT | Mod: ,,, | Performed by: UROLOGY

## 2017-04-12 PROCEDURE — 63600175 PHARM REV CODE 636 W HCPCS: Performed by: NURSE ANESTHETIST, CERTIFIED REGISTERED

## 2017-04-12 PROCEDURE — 36000706: Performed by: UROLOGY

## 2017-04-12 PROCEDURE — 37000008 HC ANESTHESIA 1ST 15 MINUTES: Performed by: UROLOGY

## 2017-04-12 PROCEDURE — 25000003 PHARM REV CODE 250: Performed by: NURSE ANESTHETIST, CERTIFIED REGISTERED

## 2017-04-12 PROCEDURE — 25000003 PHARM REV CODE 250: Performed by: UROLOGY

## 2017-04-12 PROCEDURE — 37000009 HC ANESTHESIA EA ADD 15 MINS: Performed by: UROLOGY

## 2017-04-12 PROCEDURE — 63600175 PHARM REV CODE 636 W HCPCS: Performed by: UROLOGY

## 2017-04-12 PROCEDURE — 36000707: Performed by: UROLOGY

## 2017-04-12 RX ORDER — PROPOFOL 10 MG/ML
VIAL (ML) INTRAVENOUS
Status: DISCONTINUED | OUTPATIENT
Start: 2017-04-12 | End: 2017-04-12

## 2017-04-12 RX ORDER — CIPROFLOXACIN 2 MG/ML
400 INJECTION, SOLUTION INTRAVENOUS
Status: COMPLETED | OUTPATIENT
Start: 2017-04-12 | End: 2017-04-12

## 2017-04-12 RX ORDER — PROPOFOL 10 MG/ML
VIAL (ML) INTRAVENOUS CONTINUOUS PRN
Status: DISCONTINUED | OUTPATIENT
Start: 2017-04-12 | End: 2017-04-12

## 2017-04-12 RX ORDER — SODIUM CHLORIDE 9 MG/ML
INJECTION, SOLUTION INTRAVENOUS CONTINUOUS
Status: DISCONTINUED | OUTPATIENT
Start: 2017-04-12 | End: 2017-04-12 | Stop reason: HOSPADM

## 2017-04-12 RX ORDER — MIDAZOLAM HYDROCHLORIDE 1 MG/ML
INJECTION, SOLUTION INTRAMUSCULAR; INTRAVENOUS
Status: DISCONTINUED | OUTPATIENT
Start: 2017-04-12 | End: 2017-04-12

## 2017-04-12 RX ORDER — LIDOCAINE HCL/PF 100 MG/5ML
SYRINGE (ML) INTRAVENOUS
Status: DISCONTINUED | OUTPATIENT
Start: 2017-04-12 | End: 2017-04-12

## 2017-04-12 RX ORDER — SODIUM CHLORIDE, SODIUM LACTATE, POTASSIUM CHLORIDE, CALCIUM CHLORIDE 600; 310; 30; 20 MG/100ML; MG/100ML; MG/100ML; MG/100ML
INJECTION, SOLUTION INTRAVENOUS CONTINUOUS PRN
Status: DISCONTINUED | OUTPATIENT
Start: 2017-04-12 | End: 2017-04-12

## 2017-04-12 RX ORDER — HYDROCODONE BITARTRATE AND ACETAMINOPHEN 5; 325 MG/1; MG/1
1 TABLET ORAL EVERY 4 HOURS PRN
Status: DISCONTINUED | OUTPATIENT
Start: 2017-04-12 | End: 2017-04-12 | Stop reason: HOSPADM

## 2017-04-12 RX ADMIN — PROPOFOL 20 MG: 10 INJECTION, EMULSION INTRAVENOUS at 12:04

## 2017-04-12 RX ADMIN — PROPOFOL 150 MCG/KG/MIN: 10 INJECTION, EMULSION INTRAVENOUS at 12:04

## 2017-04-12 RX ADMIN — SODIUM CHLORIDE 10 ML/HR: 0.9 INJECTION, SOLUTION INTRAVENOUS at 09:04

## 2017-04-12 RX ADMIN — SODIUM CHLORIDE, SODIUM LACTATE, POTASSIUM CHLORIDE, AND CALCIUM CHLORIDE: .6; .31; .03; .02 INJECTION, SOLUTION INTRAVENOUS at 12:04

## 2017-04-12 RX ADMIN — MIDAZOLAM 2 MG: 1 INJECTION INTRAMUSCULAR; INTRAVENOUS at 12:04

## 2017-04-12 RX ADMIN — LIDOCAINE HYDROCHLORIDE 100 MG: 20 INJECTION, SOLUTION INTRAVENOUS at 12:04

## 2017-04-12 RX ADMIN — CIPROFLOXACIN 400 MG: 2 INJECTION, SOLUTION INTRAVENOUS at 12:04

## 2017-04-12 NOTE — DISCHARGE SUMMARY
[facility]  Discharge Note  Short Stay    Admit Date: 4/12/2017    Discharge Date and Time: No discharge date for patient encounter.     Attending Physician: Cuco Washington MD     Discharge Provider: Cuco Washington    Diagnoses:  Active Hospital Problems    Diagnosis  POA    *Nephrolithiasis [N20.0]  Yes      Resolved Hospital Problems    Diagnosis Date Resolved POA   No resolved problems to display.       Discharged Condition: good    Hospital Course: Patient was admitted for an outpatient procedure and tolerated the procedure well with no complications.    Final Diagnoses: Same as principal problem.    Disposition: Home or Self Care    Follow up/Patient Instructions:  F/u in 6 weeks.  Medications:  Reconciled Home Medications:   Current Discharge Medication List      CONTINUE these medications which have NOT CHANGED    Details   alprazolam (XANAX) 1 MG tablet Take 1 tablet by mouth 2 (two) times daily.      atenolol (TENORMIN) 50 MG tablet Take 50 mg by mouth once daily.      quetiapine (SEROQUEL) 300 MG Tab Take by mouth.             Discharge Procedure Orders  Diet general     Call MD for:  temperature >100.4     Call MD for:  persistent nausea and vomiting       Follow-up Information     Follow up with Cuco Washington MD In 6 weeks.    Specialty:  Urology    Contact information:    [address]  [address]  [facility] [address]  [phone]            Discharge Procedure Orders (must include Diet, Follow-up, Activity):    Discharge Procedure Orders (must include Diet, Follow-up, Activity)  Diet general     Call MD for:  temperature >100.4     Call MD for:  persistent nausea and vomiting

## 2017-04-12 NOTE — OP NOTE
Surgery date: 4/12/17  Procedure: Flexible cysto-uretheroscopy and stent removal   Pre Procedure Diagnosis:s/p ureteroscopy and stent placement  Post Procedure Diagnosis:same   Surgeon: Cuco Washington MD   Assistant: none  Anesthesia: MAC  Indications: s/p right ureteroscopy and stent placement  Findings: stent removed intact  Procedure detail:   Flexible cysto-urethroscopy was performed after consent was obtained. The risks and benefits were explained.   2% lidocaine urojet was used for local analgesia and MAC anesthesia was also provided..   The genitalia was prepped and draped in the sterile fashion with betadine.   The flexible scope was advanced into the urethra and into the bladder. The stent was removed without difficulty.   The patient tolerated the procedure well without complication.   Estimated blood loss: none  Specimen: none  Disposition:  They will follow up in 6 weeks with a renal ultrasound and KUB.

## 2017-04-12 NOTE — ANESTHESIA POSTPROCEDURE EVALUATION
"Anesthesia Post Evaluation    Patient: Emmanuel Ervin Jr.    Procedure(s) Performed: Procedure(s) (LRB):  REMOVAL-STENT-URETERAL (Right)  CYSTOSCOPY    Final Anesthesia Type: MAC  Patient location during evaluation: OPS  Patient participation: Yes- Able to Participate  Level of consciousness: awake and alert  Post-procedure vital signs: reviewed and stable  Pain management: adequate  Airway patency: patent  PONV status at discharge: No PONV  Anesthetic complications: no      Cardiovascular status: blood pressure returned to baseline and hemodynamically stable  Respiratory status: unassisted, spontaneous ventilation and room air  Hydration status: euvolemic  Follow-up not needed.        Visit Vitals    /69 (BP Location: Right arm, Patient Position: Lying, BP Method: Automatic)    Pulse 96    Temp 36.6 °C (97.8 °F) (Oral)    Resp 18    Ht 5' 11" (1.803 m)    Wt 104.3 kg (230 lb)    SpO2 98%    BMI 32.08 kg/m2       Pain/Emperatriz Score: Pain Assessment Performed: Yes (4/12/2017 12:40 PM)  Presence of Pain: denies (4/12/2017 12:40 PM)  Emperatriz Score: 10 (4/12/2017 12:40 PM)      "

## 2017-04-12 NOTE — PLAN OF CARE
Criteria met for discharge. IV removed, instructions explained,copy given. Pt denies c/o pain or discomfort at present time. Tolerating po well, voiding without difficulty. Discharged home with family in good condition.

## 2017-04-12 NOTE — TRANSFER OF CARE
"Anesthesia Transfer of Care Note    Patient: Emmanuel Ervin Jr.    Procedure(s) Performed: Procedure(s) (LRB):  REMOVAL-STENT-URETERAL (Right)  CYSTOSCOPY    Patient location: PACU    Anesthesia Type: general    Transport from OR: Transported from OR on 6-10 L/min O2 by face mask with adequate spontaneous ventilation    Post pain: adequate analgesia    Post assessment: no apparent anesthetic complications and tolerated procedure well    Post vital signs: stable    Level of consciousness: awake    Nausea/Vomiting: no nausea/vomiting    Complications: none          Last vitals:   Visit Vitals    /69 (BP Location: Right arm, Patient Position: Lying, BP Method: Automatic)    Pulse 96    Temp 36.6 °C (97.8 °F) (Oral)    Resp 18    Ht 5' 11" (1.803 m)    Wt 104.3 kg (230 lb)    SpO2 98%    BMI 32.08 kg/m2     "

## 2017-04-12 NOTE — IP AVS SNAPSHOT
[facility] [place] [facility]  [address]  [place] [address]  Phone: [phone]           Patient Discharge Instructions   Our goal is to set you up for success. This packet includes information on your condition, medications, and your home care.  It will help you care for yourself to prevent having to return to the hospital.     Please ask your nurse if you have any questions.      There are many details to remember when preparing to leave the hospital. Here is what you will need to do:    1. Take your medicine. If you are prescribed medications, review your Medication List on the following pages. You may have new medications to  at the pharmacy and others that you'll need to stop taking. Review the instructions for how and when to take your medications. Talk with your doctor or nurses if you are unsure of what to do.     2. Go to your follow-up appointments. Specific follow-up information is listed in the following pages. Your may be contacted by a nurse or clinical provider about future appointments. Be sure we have all of the phone numbers to reach you. Please contact your provider's office if you are unable to make an appointment.     3. Watch for warning signs. Your doctor or nurse will give you detailed warning signs to watch for and when to call for assistance. These instructions may also include educational information about your condition. If you experience any of warning signs to your health, call your doctor.               ** Verify the list of medication(s) below is accurate and up to date. Carry this with you in case of emergency. If your medications have changed, please notify your healthcare provider.             Medication List      CONTINUE taking these medications        Additional Info                      alprazolam 1 MG tablet   Commonly known as:  XANAX   Refills:  0   Dose:  1 tablet    Instructions:  Take 1 tablet by mouth 2 (two) times daily.     Begin Date    AM    Noon    PM     Bedtime       atenolol 50 MG tablet   Commonly known as:  TENORMIN   Refills:  0   Dose:  50 mg    Instructions:  Take 50 mg by mouth once daily.     Begin Date    AM    Noon    PM    Bedtime       quetiapine 300 MG Tab   Commonly known as:  SEROQUEL   Refills:  0    Instructions:  Take by mouth.     Begin Date    AM    Noon    PM    Bedtime                  Please bring to all follow up appointments:    1. A copy of your discharge instructions.  2. All medicines you are currently taking in their original bottles.  3. Identification and insurance card.    Please arrive 15 minutes ahead of scheduled appointment time.    Please call 24 hours in advance if you must reschedule your appointment and/or time.        Your Scheduled Appointments     May 11, 2017  1:30 PM CDT   New Patient with MD MARIBEL Ortiz II - Urology ([facility])    [address] [place] [id number] [phone]              Follow-up Information     Follow up with Cuco Washington MD In 6 weeks.    Specialty:  Urology    Contact information:    [address] [facility]  [address]  [place] [address] [phone]          Discharge Instructions     Future Orders    Call MD for:  persistent nausea and vomiting     Call MD for:  temperature >100.4     Diet general     Questions:    Total calories:      Fat restriction, if any:      Protein restriction, if any:      Na restriction, if any:      Fluid restriction:      Additional restrictions:          Discharge Instructions         Cystoscopy    Cystoscopy is a procedure that lets your doctor look directly inside your urethra and bladder. It can be used to:  · Help diagnose a problem with your urethra, bladder, or kidneys.  · Take a sample (biopsy) of bladder or urethral tissue.  · Treat certain problems (such as removing kidney stones).  · Place a stent to bypass an obstruction.  · Take special X-rays of the kidneys.  Based on the findings, your doctor may recommend other  tests or treatments.    What is a cystoscope?  A cystoscope is a telescope-like instrument that contains lenses and fiberoptics (small glass wires that make bright light). The cystoscope may be straight and rigid, or flexible to bend around curves in the urethra. The doctor may look directly into the cystoscope, or project the image onto a monitor.    Getting ready  · Ask your doctor if you should stop taking any medications prior to the procedure.  · Ask whether you should avoid eating or drinking anything after midnight before the procedure.  · Follow any other instructions your doctor gives you.  ·   Tell your doctor before the exam if you:  · Take any medications, such as aspirin or blood thinners  · Have allergies to any medications  · Are pregnant  ·    The procedure  Cystoscopy is done in the doctors office or hospital. The doctor and a nurse are present during the procedure. It takes only a few minutes, longer if a biopsy, X-ray, or treatment needs to be done.  During the procedure:  · You lie on an exam table on your back, knees bent and legs apart. You are covered with a drape.  · Your urethra and the area around it are washed. Anesthetic jelly may be applied to numb the urethra. Other pain medication is usually not needed. In some cases, you may be offered a mild sedative to help you relax. If a more extensive procedure is to be done, such as a biopsy or kidney stone removal, general anesthesia may be needed.  · The cystoscope is inserted. A sterile fluid is put into the bladder to expand it. You may feel pressure from this fluid.  · When the procedure is done, the cystoscope is removed.  ·   After the procedure  If you had a sedative, general anesthesia, or spinal anesthesia, you must have someone drive you home. Once youre home:  · Drink plenty of fluids.  · You may have burning or light bleeding when you urinate--this is normal.  · Medications may be prescribed to ease any discomfort or prevent  "infection. Take these as directed.  · Call your doctor if you have heavy bleeding or blood clots, burning that lasts more than a day, a fever over 100°F  (38° C), or trouble urinating.    Notify Dr. Washington for any problems or concerns.            Primary Diagnosis     Your primary diagnosis was:  Kidney Stone      Admission Information     Date & Time Provider Department CSN    4/12/2017  8:33 AM Cuco Washington MD [facility] [id number]      Care Providers     Provider Role Specialty Primary office phone    Cuco Washington MD Attending Provider Urology [phone]    Cuco Washington MD Surgeon  Urology [phone]      Your Vitals Were     BP Pulse Temp Resp Height Weight    118/74 100 97.7 °F (36.5 °C) (Oral) 18 5' 11" (1.803 m) 104.3 kg (230 lb)    SpO2 BMI             98% 32.08 kg/m2         Recent Lab Values     No lab values to display.      Allergies as of 4/12/2017     No Known Allergies      [facility]     [facility] Nurse Care Line - 24/7 Assistance  Unless otherwise directed by your provider, please contact [facility], our nurse care line that is available for 24/7 assistance.     Registered nurses in the [facility] provide clinical advisement, health education, appointment booking, and other advisory services.  Call for this free service at [phone].        Advance Directives     An advance directive is a document which, in the event you are no longer able to make decisions for yourself, tells your healthcare team what kind of treatment you do or do not want to receive, or who you would like to make those decisions for you.  If you do not currently have an advance directive, [facility] encourages you to create one.  For more information call:  [phone]), [phone] ([phone]),  or log on to [email or web address].        Language Assistance Services     ATTENTION: Language assistance services are available, free of charge. " Please call [phone].      ATENCIÓN: Si habla christiane, tiene a rios disposición servicios gratuitos de asistencia lingüística. Llame al [phone].     CHÚ Ý: N?u b?n nói Ti?ng Vi?t, có các d?ch v? h? tr? ngôn ng? mi?n phí dành cho b?n. G?i s? [phone].        [facility] Sign-Up     Activating your [facility] account is as easy as 1-2-3!     1) Visit [email or web address], select Sign Up Now, enter this activation code and your date of birth, then select Next.  Activation code not generated  Current Patient Portal Status: Account disabled      2) Create a username and password to use when you visit [facility] in the future and select a security question in case you lose your password and select Next.    3) Enter your e-mail address and click Sign Up!    Additional Information  If you have questions, please e-mail [email or web address] or call [phone] to talk to our [facility] staff. Remember, [facility] is NOT to be used for urgent needs. For medical emergencies, dial 911.          [facility] complies with applicable Federal civil rights laws and does not discriminate on the basis of race, color, national origin, age, disability, or sex.

## 2017-04-12 NOTE — ANESTHESIA PREPROCEDURE EVALUATION
04/12/2017     Emmanuel Ervin Jr. is a 27 y.o., male w kidney stones for stent removal      PRIOR ANES  2017-03-29 Cysto/Stent GA VSS w no pressor, NAAC  Many other urol p rocedures in epic; pt states NAAC    ANES-RELATED MED/SURG  HTN    Past Surgical History:   Procedure Laterality Date    EXTRACORPOREAL SHOCK WAVE LITHOTRIPSY  2016    multiple since 2009    SKIN GRAFT Right     s/p traumatic injury to foot at 1 y/o    TOE AMPUTATION Right     due to  accident at 1 y/o         [facility] Anesthesia Evaluation    I have reviewed the Patient Summary Reports.    I have reviewed the Nursing Notes.   I have reviewed the Medications.     Review of Systems  Anesthesia Hx:  Hx of Anesthetic complications  History of prior surgery of interest to airway management or planning: Previous anesthesia: General, MAC   Procedure performed at an [facility]. Airway issues documented on chart review include mask, easy, easy direct laryngoscopy , view on direct laryngoscopy Grade I  Personal Hx of Anesthesia complications  Unsatisfactory Post-Operative Pain Control (pt reports significant pain after last procedure 5/2016)  Social:  Non-Smoker, No Alcohol Use    Hematology/Oncology:  Hematology Normal        EENT/Dental:EENT/Dental Normal   Cardiovascular:   Exercise tolerance: good Hypertension, well controlled Denies Dysrhythmias.   Denies Angina.        Pulmonary:   Denies Shortness of breath.    Renal/:   renal calculi Denies hematuria or dysuria, denies pain   Hepatic/GI:   GERD, well controlled    Neurological:  Neurology Normal    Endocrine:  Endocrine Normal    Psych:   anxiety depression            Physical Exam  General:  Well nourished    Airway/Jaw/Neck:  AIRWAY FINDINGS: Normal Mallampati: II Improves to I with phonation.        Eyes/Ears/Nose:  EYES/EARS/NOSE FINDINGS: Normal   Dental:  Dental  Findings: In tact, Periodontal disease, Mild   Chest/Lungs:  Chest/Lungs Clear    Heart/Vascular:  Heart Findings: Normal Heart murmur: negative    Abdomen:  Abdomen Findings: Normal      Mental Status:  Mental Status Findings: Normal        Anesthesia Plan  Type of Anesthesia, risks & benefits discussed:  Anesthesia Type:  general  Patient's Preference:   Intra-op Monitoring Plan:   Intra-op Monitoring Plan Comments:   Post Op Pain Control Plan:   Post Op Pain Control Plan Comments:   Induction:   IV  Beta Blocker:         Informed Consent: Patient understands risks and agrees with Anesthesia plan.  Questions answered.   ASA Score: 2     Day of Surgery Review of History & Physical:    H&P update referred to the surgeon.     Anesthesia Plan Notes: 2017-01-12   - Dr. CINTRON says procedure will take 1 minute   - NPO          Ready For Surgery From Anesthesia Perspective.

## 2017-04-12 NOTE — DISCHARGE INSTRUCTIONS

## 2017-04-12 NOTE — INTERVAL H&P NOTE
The patient has been examined and the H&P has been reviewed:    I concur with the findings and no changes have occurred since H&P was written.  Here for stent removal.     Anesthesia/Surgery risks, benefits and alternative options discussed and understood by patient/family.          Active Hospital Problems    Diagnosis  POA    Nephrolithiasis [N20.0]  Yes      Resolved Hospital Problems    Diagnosis Date Resolved POA   No resolved problems to display.

## 2017-04-13 VITALS
HEIGHT: 71 IN | HEART RATE: 94 BPM | BODY MASS INDEX: 32.2 KG/M2 | TEMPERATURE: 98 F | WEIGHT: 230 LBS | OXYGEN SATURATION: 98 % | SYSTOLIC BLOOD PRESSURE: 127 MMHG | RESPIRATION RATE: 18 BRPM | DIASTOLIC BLOOD PRESSURE: 74 MMHG

## 2017-06-09 ENCOUNTER — NURSE TRIAGE (OUTPATIENT)
Dept: ADMINISTRATIVE | Facility: CLINIC | Age: 27
End: 2017-06-09

## 2017-06-09 ENCOUNTER — HOSPITAL ENCOUNTER (EMERGENCY)
Facility: HOSPITAL | Age: 27
Discharge: HOME | End: 2017-06-09
Attending: INTERNAL MEDICINE
Payer: MEDICAID

## 2017-06-09 VITALS
HEART RATE: 88 BPM | RESPIRATION RATE: 16 BRPM | SYSTOLIC BLOOD PRESSURE: 140 MMHG | DIASTOLIC BLOOD PRESSURE: 99 MMHG | TEMPERATURE: 97 F

## 2017-06-09 DIAGNOSIS — S20.211A RIB CONTUSION, RIGHT, INITIAL ENCOUNTER: Primary | ICD-10-CM

## 2017-06-09 DIAGNOSIS — S29.8XXA: ICD-10-CM

## 2017-06-09 PROCEDURE — 63600175 PHARM REV CODE 636 W HCPCS: Performed by: INTERNAL MEDICINE

## 2017-06-09 PROCEDURE — 96372 THER/PROPH/DIAG INJ SC/IM: CPT

## 2017-06-09 PROCEDURE — 25000003 PHARM REV CODE 250: Performed by: INTERNAL MEDICINE

## 2017-06-09 PROCEDURE — 99283 EMERGENCY DEPT VISIT LOW MDM: CPT | Mod: 25

## 2017-06-09 RX ORDER — HYDROCODONE BITARTRATE AND ACETAMINOPHEN 5; 325 MG/1; MG/1
1 TABLET ORAL
Status: COMPLETED | OUTPATIENT
Start: 2017-06-09 | End: 2017-06-09

## 2017-06-09 RX ORDER — PROMETHAZINE HYDROCHLORIDE 25 MG/ML
12.5 INJECTION, SOLUTION INTRAMUSCULAR; INTRAVENOUS
Status: COMPLETED | OUTPATIENT
Start: 2017-06-09 | End: 2017-06-09

## 2017-06-09 RX ORDER — HYDROMORPHONE HYDROCHLORIDE 1 MG/ML
1 INJECTION, SOLUTION INTRAMUSCULAR; INTRAVENOUS; SUBCUTANEOUS
Status: COMPLETED | OUTPATIENT
Start: 2017-06-09 | End: 2017-06-09

## 2017-06-09 RX ORDER — NAPROXEN 375 MG/1
375 TABLET ORAL 2 TIMES DAILY WITH MEALS
Qty: 20 TABLET | Refills: 0 | Status: SHIPPED | OUTPATIENT
Start: 2017-06-09 | End: 2017-11-02

## 2017-06-09 RX ADMIN — PROMETHAZINE HYDROCHLORIDE 12.5 MG: 25 INJECTION INTRAMUSCULAR; INTRAVENOUS at 04:06

## 2017-06-09 RX ADMIN — HYDROMORPHONE HYDROCHLORIDE 1 MG: 1 INJECTION, SOLUTION INTRAMUSCULAR; INTRAVENOUS; SUBCUTANEOUS at 04:06

## 2017-06-09 RX ADMIN — HYDROCODONE BITARTRATE AND ACETAMINOPHEN 1 TABLET: 5; 325 TABLET ORAL at 03:06

## 2017-06-09 NOTE — ED PROVIDER NOTES
Encounter Date: 6/9/2017       History     Chief Complaint   Patient presents with    Muscle Pain     Review of patient's allergies indicates:  No Known Allergies  The history is provided by the patient.   Injury    The incident occurred two days ago (Pt fell and struck right lower ribcage on edge of steps 2 days ago. Pt says pain worse now 10/10 than at time of incident.). The injury mechanism was a fall. The context of the injury is unknown. The wounds were not self-inflicted. He came to the ER via by private vehicle. The pain is at a severity of 8/10. Pertinent negatives include no altered mental status, no focal weakness, no cough and no difficulty breathing. There have been no prior injuries to these areas. He is right-handed.     Past Medical History:   Diagnosis Date    Hypertension     Kidney stones     UTI (urinary tract infection)     diagnosed on 4/16/16     Past Surgical History:   Procedure Laterality Date    EXTRACORPOREAL SHOCK WAVE LITHOTRIPSY  2016    multiple since 2009    SKIN GRAFT Right     s/p traumatic injury to foot at 1 y/o    TOE AMPUTATION Right     due to  accident at 1 y/o     Family History   Problem Relation Age of Onset    Diabetes Maternal Aunt     Heart disease Maternal Aunt     Diabetes Maternal Grandmother     Hypertension Maternal Grandmother     Heart disease Maternal Grandmother     Prostate cancer Neg Hx     Kidney disease Neg Hx      Social History   Substance Use Topics    Smoking status: Never Smoker    Smokeless tobacco: Not on file    Alcohol use No     Review of Systems   Respiratory: Negative for cough.    Neurological: Negative for focal weakness.   All other systems reviewed and are negative.      Physical Exam     Initial Vitals [06/09/17 0240]   BP Pulse Resp Temp SpO2   (!) 140/99 88 16 97.3 °F (36.3 °C) --     Physical Exam    Nursing note and vitals reviewed.  Constitutional: He appears well-developed and well-nourished. He is not  diaphoretic. No distress.   HENT:   Head: Normocephalic and atraumatic.   Right Ear: External ear normal.   Left Ear: External ear normal.   Nose: Nose normal.   Mouth/Throat: Oropharynx is clear and moist. No oropharyngeal exudate.   Eyes: Conjunctivae and EOM are normal. Pupils are equal, round, and reactive to light. Right eye exhibits no discharge. Left eye exhibits no discharge. No scleral icterus.   Neck: Normal range of motion. Neck supple. No thyromegaly present. No tracheal deviation present. No JVD present.   Cardiovascular: Normal rate, regular rhythm, normal heart sounds and intact distal pulses. Exam reveals no gallop and no friction rub.    No murmur heard.  Pulmonary/Chest: Breath sounds normal. No stridor. No respiratory distress. He has no wheezes. He has no rhonchi. He has no rales.   There is tenderness to palpation along the right costal margin, with exquisite point tenderness along the midclavicular line. No bruise or swelling is noted.   Abdominal: Soft. Bowel sounds are normal. He exhibits no distension and no mass. There is no tenderness. There is no rebound and no guarding.   Musculoskeletal: Normal range of motion. He exhibits no edema or tenderness.   Lymphadenopathy:     He has no cervical adenopathy.   Neurological: He is alert and oriented to person, place, and time. He has normal strength.   Skin: Skin is warm and dry. Capillary refill takes less than 2 seconds.   Psychiatric: He has a normal mood and affect.         ED Course   Procedures  Labs Reviewed - No data to display       X-Rays:   Independently Interpreted Readings:   Other Readings:  CXR- normal    Medical Decision Making:   Initial Assessment:   Right inferior costal margin tenderness after fall 2 days ago  Differential Diagnosis:   Rib contusion  Rib fracture  Independently Interpreted Test(s):   I have ordered and independently interpreted X-rays - see prior notes.  ED Management:  Pt was given pain medication and  discharged in stable condition.                   ED Course     Clinical Impression:   The primary encounter diagnosis was Rib contusion, right, initial encounter. A diagnosis of Blunt trauma of rib was also pertinent to this visit.    Disposition:   Disposition: Discharged  Condition: Stable       Ava Adamson MD  06/14/17 3769

## 2017-06-09 NOTE — TELEPHONE ENCOUNTER
"Slipped down stairs on yesterday and hit ribs on right side and is having pain     Reason for Disposition   [1] Difficulty breathing AND [2] not severe    Answer Assessment - Initial Assessment Questions  1. MECHANISM: "How did the injury happen?"      Slipped down stairs on yesterday  2. ONSET: "When did the injury happen?" (Minutes or hours ago)      yesterday  3. LOCATION: "Where on the chest is the injury located?"      Right chest area  4. APPEARANCE: "What does the injury look like?"      No bruising or swelling  5. BLEEDING: "Is there any bleeding now? If so, ask: How long has it been bleeding?"      no  6. SEVERITY: "Any difficulty with breathing?"      Hurts with deep breathe  7. SIZE: For cuts, bruises, or swelling, ask: "How large is it?" (e.g., inches or centimeters)      denies  8. PAIN: "Is there pain?" If so, ask: "How bad is the pain?"   (e.g., Scale 1-10; or mild, moderate, severe)      9/10  9. TETANUS: For any breaks in the skin, ask: "When was the last tetanus booster?"      Not applicable  10. PREGNANCY: "Is there any chance you are pregnant?" "When was your last menstrual period?"        Not applicable    Protocols used: ST CHEST INJURY-A-      "

## 2017-11-05 ENCOUNTER — NURSE TRIAGE (OUTPATIENT)
Dept: ADMINISTRATIVE | Facility: CLINIC | Age: 27
End: 2017-11-05

## 2017-11-05 ENCOUNTER — HOSPITAL ENCOUNTER (EMERGENCY)
Facility: HOSPITAL | Age: 27
Discharge: HOME | End: 2017-11-05
Attending: SURGERY
Payer: MEDICAID

## 2017-11-05 VITALS
HEIGHT: 71 IN | TEMPERATURE: 98 F | HEART RATE: 88 BPM | DIASTOLIC BLOOD PRESSURE: 80 MMHG | OXYGEN SATURATION: 98 % | SYSTOLIC BLOOD PRESSURE: 140 MMHG | WEIGHT: 240 LBS | BODY MASS INDEX: 33.6 KG/M2 | RESPIRATION RATE: 18 BRPM

## 2017-11-05 DIAGNOSIS — S42.301A CLOSED FRACTURE OF RIGHT UPPER EXTREMITY, INITIAL ENCOUNTER: Primary | ICD-10-CM

## 2017-11-05 PROCEDURE — 63600175 PHARM REV CODE 636 W HCPCS: Performed by: SURGERY

## 2017-11-05 PROCEDURE — 96372 THER/PROPH/DIAG INJ SC/IM: CPT

## 2017-11-05 PROCEDURE — 99283 EMERGENCY DEPT VISIT LOW MDM: CPT | Mod: 25

## 2017-11-05 RX ORDER — HYDROCODONE BITARTRATE AND ACETAMINOPHEN 10; 325 MG/1; MG/1
1 TABLET ORAL EVERY 6 HOURS PRN
Qty: 20 TABLET | Refills: 0 | Status: SHIPPED | OUTPATIENT
Start: 2017-11-05 | End: 2017-11-15

## 2017-11-05 RX ORDER — ONDANSETRON 2 MG/ML
4 INJECTION INTRAMUSCULAR; INTRAVENOUS
Status: COMPLETED | OUTPATIENT
Start: 2017-11-05 | End: 2017-11-05

## 2017-11-05 RX ORDER — HYDROMORPHONE HYDROCHLORIDE 1 MG/ML
1 INJECTION, SOLUTION INTRAMUSCULAR; INTRAVENOUS; SUBCUTANEOUS
Status: DISCONTINUED | OUTPATIENT
Start: 2017-11-05 | End: 2017-11-05 | Stop reason: HOSPADM

## 2017-11-05 RX ORDER — ONDANSETRON 2 MG/ML
4 INJECTION INTRAMUSCULAR; INTRAVENOUS
Status: DISCONTINUED | OUTPATIENT
Start: 2017-11-05 | End: 2017-11-05 | Stop reason: HOSPADM

## 2017-11-05 RX ORDER — HYDROMORPHONE HYDROCHLORIDE 1 MG/ML
1 INJECTION, SOLUTION INTRAMUSCULAR; INTRAVENOUS; SUBCUTANEOUS
Status: COMPLETED | OUTPATIENT
Start: 2017-11-05 | End: 2017-11-05

## 2017-11-05 RX ADMIN — ONDANSETRON 4 MG: 2 INJECTION INTRAMUSCULAR; INTRAVENOUS at 09:11

## 2017-11-05 RX ADMIN — HYDROMORPHONE HYDROCHLORIDE 1 MG: 1 INJECTION, SOLUTION INTRAMUSCULAR; INTRAVENOUS; SUBCUTANEOUS at 09:11

## 2017-11-05 NOTE — ED PROVIDER NOTES
[facility]                                     November 5, 2017                   Chief Complaint  27 y.o. male with Arm Pain    History of Present Illness  Emmanuel Ervin JrAyush presents to the emergency room with a previous right arm fracture  Patient was seen in another emergency room on 30 October 2017, radius fracture  Patient states he currently awaits for an orthopedic appointment in [facility]  Pt states that he ran out of pain medication, sugar tong splint is clean dry and intact  Patient has good distal pulses and capillary refill, neurovascularly intact on exam now  Patient has normal finger movement, splint is appropriate, wants pain medication refill    The history is provided by the patient  Medical history: Hypertension, kidney stones, UTI  Surgical history: Lithotripsy, skin graft, toe amputation  No Known Allergies     Review of Systems and Physical Exam     Review of Systems  -- Constitution - no fever, denies fatigue, no weakness, no chills  -- Eyes - no tearing or redness, no visual disturbance  -- Ear, Nose - no tinnitus or earache, no nasal congestion or discharge  -- Mouth,Throat - no sore throat, no toothache, normal voice, normal swallowing  -- Respiratory - denies cough and congestion, no shortness of breath, no GÓMEZ  -- Cardiovascular - denies chest pain, no palpitations, denies claudication  -- Gastrointestinal - denies abdominal pain, nausea, vomiting, or diarrhea  -- Musculoskeletal - right arm fracture  -- Neurological - no headache, denies weakness or seizure; no LOC  -- Skin - denies pallor, rash, or changes in skin. no hives or welts noted    Vital Signs  -- His oral temperature is 98 °F (36.7 °C).   -- His blood pressure is 140/80 and his pulse is 88.   -- His respiration is 18 and oxygen saturation is 98%.      Physical Exam  -- Nursing note and vitals reviewed  -- Head: Atraumatic. Normocephalic. No obvious abnormality  -- Eyes: Pupils are equal and reactive  to light. Normal conjunctiva and lids  -- Cardiac: Normal rate, regular rhythm and normal heart sounds  -- Pulmonary: Normal respiratory effort, breath sounds clear to auscultation  -- Abdominal: Soft, no tenderness. Normal bowel sounds. Normal liver edge  -- Musculoskeletal: Sugar tong splint on the right upper extremity  -- Neurological: No focal deficits. Showed good interaction with staff  -- Vascular: Posterior tibial, dorsalis pedis and radial pulses 2+ bilaterally      Emergency Room Course     Right wrist [facility] on November 2, 2017 at [facility]  -- No change in alignment nondisplaced intra-articular distal right radial   -- fracture with accompanying ulnar styloid fracture.    Medications Given  -- HYDROmorphone injection 1 mg ( Intramuscular Canceled Entry 11/5/17 1000)   -- ondansetron injection 4 mg ( Intramuscular Canceled Entry 11/5/17 1000)   -- HYDROmorphone injection 1 mg (1 mg Intramuscular Given 11/5/17 0951)   -- ondansetron injection 4 mg (4 mg Intramuscular Given 11/5/17 0951)     Diagnosis  -- The encounter diagnosis was Closed fracture of right upper extremity, initial encounter.    Disposition and Plan  -- Disposition: home  -- Condition: stable  -- Follow-up: Patient to follow up with Orthopedics in 1-2 days.  -- I advised the patient that we have found no life threatening condition today  -- At this time, I believe the patient is clinically stable for discharge.   -- The patient acknowledges that close follow up with a MD is required   -- Patient agrees to comply with all instruction and direction given in the ER    This note is dictated on Dragon Natural Speaking word recognition program.  There are word recognition mistakes that are occasionally missed on review.           Wang Fraser MD  11/05/17 1283

## 2017-11-05 NOTE — ED TRIAGE NOTES
Patient presents to the ER with c/o right arm pain after breaking his wrist.  Patient is wearing a splint from Oct 30.  Patient reports that he has been unable to get an appointment with ortho.

## 2017-11-05 NOTE — ED NOTES
Patient discharged home with family.  Discharge instructions given with patient verbalizing understanding.  Patient will follow up with ortho next week.  Patient has no questions or concerns at this time.

## 2018-09-23 ENCOUNTER — HOSPITAL ENCOUNTER (EMERGENCY)
Facility: HOSPITAL | Age: 28
Discharge: HOME | End: 2018-09-23
Attending: INTERNAL MEDICINE
Payer: COMMERCIAL

## 2018-09-23 VITALS
HEART RATE: 75 BPM | BODY MASS INDEX: 34.29 KG/M2 | DIASTOLIC BLOOD PRESSURE: 80 MMHG | OXYGEN SATURATION: 98 % | WEIGHT: 244.94 LBS | RESPIRATION RATE: 16 BRPM | TEMPERATURE: 97 F | SYSTOLIC BLOOD PRESSURE: 121 MMHG | HEIGHT: 71 IN

## 2018-09-23 DIAGNOSIS — N20.0 KIDNEY STONE: Primary | ICD-10-CM

## 2018-09-23 LAB
ALBUMIN SERPL BCP-MCNC: 3.9 G/DL
ALP SERPL-CCNC: 122 U/L
ALT SERPL W/O P-5'-P-CCNC: 30 U/L
ANION GAP SERPL CALC-SCNC: 13 MMOL/L
AST SERPL-CCNC: 21 U/L
BASOPHILS # BLD AUTO: 0.05 K/UL
BASOPHILS NFR BLD: 0.5 %
BILIRUB SERPL-MCNC: 0.8 MG/DL
BILIRUB UR QL STRIP: NEGATIVE
BUN SERPL-MCNC: 8 MG/DL
CALCIUM SERPL-MCNC: 9.8 MG/DL
CHLORIDE SERPL-SCNC: 108 MMOL/L
CLARITY UR: CLEAR
CO2 SERPL-SCNC: 22 MMOL/L
COLOR UR: YELLOW
CREAT SERPL-MCNC: 1 MG/DL
DIFFERENTIAL METHOD: ABNORMAL
EOSINOPHIL # BLD AUTO: 0.2 K/UL
EOSINOPHIL NFR BLD: 2.1 %
ERYTHROCYTE [DISTWIDTH] IN BLOOD BY AUTOMATED COUNT: 13.7 %
EST. GFR  (AFRICAN AMERICAN): >60 ML/MIN/1.73 M^2
EST. GFR  (NON AFRICAN AMERICAN): >60 ML/MIN/1.73 M^2
GLUCOSE SERPL-MCNC: 91 MG/DL
GLUCOSE UR QL STRIP: NEGATIVE
HCT VFR BLD AUTO: 45 %
HGB BLD-MCNC: 15.7 G/DL
HGB UR QL STRIP: NEGATIVE
KETONES UR QL STRIP: NEGATIVE
LEUKOCYTE ESTERASE UR QL STRIP: NEGATIVE
LYMPHOCYTES # BLD AUTO: 3.4 K/UL
LYMPHOCYTES NFR BLD: 36.8 %
MCH RBC QN AUTO: 28.8 PG
MCHC RBC AUTO-ENTMCNC: 34.9 G/DL
MCV RBC AUTO: 83 FL
MONOCYTES # BLD AUTO: 1.1 K/UL
MONOCYTES NFR BLD: 11.8 %
NEUTROPHILS # BLD AUTO: 4.4 K/UL
NEUTROPHILS NFR BLD: 48.8 %
NITRITE UR QL STRIP: NEGATIVE
PH UR STRIP: 6 [PH] (ref 5–8)
PLATELET # BLD AUTO: 211 K/UL
PMV BLD AUTO: 12.2 FL
POTASSIUM SERPL-SCNC: 3.7 MMOL/L
PROT SERPL-MCNC: 7.4 G/DL
PROT UR QL STRIP: NEGATIVE
RBC # BLD AUTO: 5.45 M/UL
SODIUM SERPL-SCNC: 143 MMOL/L
SP GR UR STRIP: 1.02 (ref 1–1.03)
URN SPEC COLLECT METH UR: NORMAL
UROBILINOGEN UR STRIP-ACNC: NEGATIVE EU/DL
WBC # BLD AUTO: 9.1 K/UL

## 2018-09-23 PROCEDURE — 36415 COLL VENOUS BLD VENIPUNCTURE: CPT

## 2018-09-23 PROCEDURE — 96365 THER/PROPH/DIAG IV INF INIT: CPT

## 2018-09-23 PROCEDURE — 96361 HYDRATE IV INFUSION ADD-ON: CPT

## 2018-09-23 PROCEDURE — 63600175 PHARM REV CODE 636 W HCPCS: Performed by: INTERNAL MEDICINE

## 2018-09-23 PROCEDURE — 81003 URINALYSIS AUTO W/O SCOPE: CPT

## 2018-09-23 PROCEDURE — 80053 COMPREHEN METABOLIC PANEL: CPT

## 2018-09-23 PROCEDURE — 25000003 PHARM REV CODE 250: Performed by: INTERNAL MEDICINE

## 2018-09-23 PROCEDURE — 99284 EMERGENCY DEPT VISIT MOD MDM: CPT | Mod: 25

## 2018-09-23 PROCEDURE — 85025 COMPLETE CBC W/AUTO DIFF WBC: CPT

## 2018-09-23 PROCEDURE — 96375 TX/PRO/DX INJ NEW DRUG ADDON: CPT

## 2018-09-23 RX ORDER — MORPHINE SULFATE 2 MG/ML
3 INJECTION, SOLUTION INTRAMUSCULAR; INTRAVENOUS
Status: COMPLETED | OUTPATIENT
Start: 2018-09-23 | End: 2018-09-23

## 2018-09-23 RX ORDER — KETOROLAC TROMETHAMINE 30 MG/ML
30 INJECTION, SOLUTION INTRAMUSCULAR; INTRAVENOUS
Status: COMPLETED | OUTPATIENT
Start: 2018-09-23 | End: 2018-09-23

## 2018-09-23 RX ORDER — OXYCODONE AND ACETAMINOPHEN 5; 325 MG/1; MG/1
1 TABLET ORAL EVERY 6 HOURS PRN
Qty: 12 TABLET | Refills: 0 | Status: SHIPPED | OUTPATIENT
Start: 2018-09-23

## 2018-09-23 RX ORDER — MEPERIDINE HYDROCHLORIDE 25 MG/ML
25 INJECTION INTRAMUSCULAR; INTRAVENOUS; SUBCUTANEOUS
Status: COMPLETED | OUTPATIENT
Start: 2018-09-23 | End: 2018-09-23

## 2018-09-23 RX ADMIN — MEPERIDINE HYDROCHLORIDE 25 MG: 25 INJECTION INTRAMUSCULAR; INTRAVENOUS; SUBCUTANEOUS at 02:09

## 2018-09-23 RX ADMIN — Medication 3 MG: at 12:09

## 2018-09-23 RX ADMIN — PROMETHAZINE HYDROCHLORIDE 12.5 MG: 25 INJECTION INTRAMUSCULAR; INTRAVENOUS at 12:09

## 2018-09-23 RX ADMIN — KETOROLAC TROMETHAMINE 30 MG: 30 INJECTION, SOLUTION INTRAMUSCULAR at 11:09

## 2018-09-23 RX ADMIN — SODIUM CHLORIDE 1000 ML: 0.9 INJECTION, SOLUTION INTRAVENOUS at 11:09

## 2018-09-23 NOTE — ED NOTES
Pt resting comfortably on ED stretcher. NADN. AAOx3. Respirations even and unlabored. Bed locked in lowest position. Call bell within reach. Fluids without difficulty. Awaiting MD orders.

## 2018-09-23 NOTE — ED PROVIDER NOTES
Encounter Date: 9/23/2018       History     Chief Complaint   Patient presents with    Flank Pain     Rt-sided flank pain, intermittently, for the past 2 days. Pt works outside in the heat and has had about 7 or 8 procedures on the right kidney/ureter to remove stones. Pt denies hematuria or dysuria. No fever or nausea.      The history is provided by the patient.     Review of patient's allergies indicates:  No Known Allergies  Past Medical History:   Diagnosis Date    Hypertension     Kidney stones     UTI (urinary tract infection)     diagnosed on 4/16/16     Past Surgical History:   Procedure Laterality Date    CYSTOSCOPY  4/12/2017    Performed by Cuco Washington [facility] at [facility] OR    CYSTOSCOPY N/A 5/30/2016    Performed by [facility] at [facility] OR    CYSTOSCOPY WITH STENT PLACEMENT Right 3/29/2017    Performed by Cuco [facility] at [facility] OR    CYSTOSCOPY WITH STENT PLACEMENT Right 4/20/2016    Performed by [facility]uc[facility] Washington [facility] at [facility] OR    CYSTOSCOPY/ RETROGRADE PYELOGRAM/ STENT PLACEMENT/STENT EXCHANGE Right 3/24/2016    Performed by [facility]uc[facility] at [facility] OR    EXTRACORPOREAL SHOCK WAVE LITHOTRIPSY  2016    multiple since 2009    EXTRACTION-STONE-URETEROSCOPY Right 3/29/2017    Performed by [facility]uc[facility] at [facility] OR    EXTRACTION-STONE-URETEROSCOPY Right 4/20/2016    Performed by [facility]uco Washington [facility] at [facility] OR    EXTRACTION-STONE-URETEROSCOPY Right 3/24/2016    Performed by uCco Washington [facility] at [facility] OR    LASER holmium Right 3/29/2017    Performed by Cuco [facility] at [facility] OR    LITHOTRIPSY, ESWL Left 4/30/2014    [facility] at [facility] OR    NEPHROSTOLITHOTOMY-PERCUTANEOUS Left 1/26/2016    Performed by [facility] at [facility] OR [facility]ND [facility]    NEPHROSTOLITHOTOMY-PERCUTANEOUS  11/20/2015    Performed by Cuco [facility] at [facility] OR [facility]ND [facility]    PYELOGRAM-RETROGRADE Right 3/29/2017    Performed by Cuco [facility] at [facility]  OR    REMOVAL-STENT Right 5/30/2016    Performed by [facility] at [facility] OR    REMOVAL-STENT-URETERAL Right 4/12/2017    Performed by [facility]uc[facility] at [facility] OR    SKIN GRAFT Right     s/p traumatic injury to foot at 1 y/o    TOE AMPUTATION Right     due to  accident at 1 y/o     Family History   Problem Relation Age of Onset    Diabetes Maternal Aunt     Heart disease Maternal Aunt     Diabetes Maternal Grandmother     Hypertension Maternal Grandmother     Heart disease Maternal Grandmother     Prostate cancer Neg Hx     Kidney disease Neg Hx      Social History     Tobacco Use    Smoking status: Never Smoker    Smokeless tobacco: Never Used   Substance Use Topics    Alcohol use: No    Drug use: No     Review of Systems   All other systems reviewed and are negative.      Physical Exam     Initial Vitals [09/23/18 1011]   BP Pulse Resp Temp SpO2   (!) 126/93 80 16 97 °F (36.1 °C) 100 %      MAP       --         Physical Exam    Nursing note and vitals reviewed.  Constitutional: He appears well-developed and well-nourished.   HENT:   Head: Normocephalic.   Right Ear: External ear normal.   Nose: Nose normal.   Eyes: Conjunctivae and EOM are normal. Pupils are equal, round, and reactive to light. No scleral icterus.   Neck: Normal range of motion. Neck supple. No thyromegaly present.   Cardiovascular: Normal rate, regular rhythm, normal heart sounds and intact distal pulses. Exam reveals no gallop and no friction rub.    No murmur heard.  Pulmonary/Chest: Breath sounds normal. No respiratory distress.   Right CVA tenderness noted on percussion.   Abdominal: Soft. Bowel sounds are normal. He exhibits no distension and no mass. There is no tenderness. There is no rebound and no guarding.   Musculoskeletal: Normal range of motion.   Neurological: He is alert and oriented to person, place, and time. He has normal strength.   Skin: Skin is warm and dry. Capillary refill takes less  than 2 seconds.         ED Course   Procedures  Labs Reviewed   CBC W/ AUTO DIFFERENTIAL   COMPREHENSIVE METABOLIC PANEL   URINALYSIS          Imaging Results    None          Medical Decision Making:   Initial Assessment:   Right flank pain  Differential Diagnosis:   Kidney stone  Musculoskeletal pain  Clinical Tests:   Lab Tests: Ordered and Reviewed  Radiological Study: Ordered and Reviewed                      Clinical Impression:   The encounter diagnosis was Kidney stone.      Disposition:   Disposition: Discharged  Condition: Stable                        Ava Adamson MD  09/28/18 0132

## 2018-09-23 NOTE — ED NOTES
Pt resting comfortably on ED stretcher. NADN. AAOx3. Respirations even and unlabored. Bed locked in lowest position. Call bell within reach. Awaiting MD orders.

## 2018-10-12 ENCOUNTER — HOSPITAL ENCOUNTER (EMERGENCY)
Facility: HOSPITAL | Age: 28
Discharge: HOME | End: 2018-10-13
Attending: SURGERY
Payer: COMMERCIAL

## 2018-10-12 ENCOUNTER — NURSE TRIAGE (OUTPATIENT)
Dept: ADMINISTRATIVE | Facility: CLINIC | Age: 28
End: 2018-10-12

## 2018-10-12 DIAGNOSIS — R10.9 RIGHT FLANK PAIN: Primary | ICD-10-CM

## 2018-10-12 LAB
ALBUMIN SERPL BCP-MCNC: 4.4 G/DL
ALP SERPL-CCNC: 115 U/L
ALT SERPL W/O P-5'-P-CCNC: 34 U/L
ANION GAP SERPL CALC-SCNC: 12 MMOL/L
AST SERPL-CCNC: 31 U/L
BASOPHILS # BLD AUTO: 0.06 K/UL
BASOPHILS NFR BLD: 0.7 %
BILIRUB SERPL-MCNC: 0.9 MG/DL
BUN SERPL-MCNC: 8 MG/DL
CALCIUM SERPL-MCNC: 9.5 MG/DL
CHLORIDE SERPL-SCNC: 106 MMOL/L
CO2 SERPL-SCNC: 25 MMOL/L
CREAT SERPL-MCNC: 1.1 MG/DL
DIFFERENTIAL METHOD: ABNORMAL
EOSINOPHIL # BLD AUTO: 0.1 K/UL
EOSINOPHIL NFR BLD: 1.5 %
ERYTHROCYTE [DISTWIDTH] IN BLOOD BY AUTOMATED COUNT: 14 %
EST. GFR  (AFRICAN AMERICAN): >60 ML/MIN/1.73 M^2
EST. GFR  (NON AFRICAN AMERICAN): >60 ML/MIN/1.73 M^2
GLUCOSE SERPL-MCNC: 81 MG/DL
HCT VFR BLD AUTO: 47.7 %
HGB BLD-MCNC: 16.1 G/DL
LIPASE SERPL-CCNC: 40 U/L
LYMPHOCYTES # BLD AUTO: 3.2 K/UL
LYMPHOCYTES NFR BLD: 34.5 %
MCH RBC QN AUTO: 28.5 PG
MCHC RBC AUTO-ENTMCNC: 33.8 G/DL
MCV RBC AUTO: 84 FL
MONOCYTES # BLD AUTO: 1.3 K/UL
MONOCYTES NFR BLD: 13.9 %
NEUTROPHILS # BLD AUTO: 4.6 K/UL
NEUTROPHILS NFR BLD: 49.4 %
PLATELET # BLD AUTO: 231 K/UL
PMV BLD AUTO: 12.4 FL
POTASSIUM SERPL-SCNC: 3.4 MMOL/L
PROT SERPL-MCNC: 8.2 G/DL
RBC # BLD AUTO: 5.65 M/UL
SODIUM SERPL-SCNC: 143 MMOL/L
WBC # BLD AUTO: 9.2 K/UL

## 2018-10-12 PROCEDURE — 96361 HYDRATE IV INFUSION ADD-ON: CPT

## 2018-10-12 PROCEDURE — 80307 DRUG TEST PRSMV CHEM ANLYZR: CPT

## 2018-10-12 PROCEDURE — 80053 COMPREHEN METABOLIC PANEL: CPT

## 2018-10-12 PROCEDURE — 63600175 PHARM REV CODE 636 W HCPCS: Performed by: SURGERY

## 2018-10-12 PROCEDURE — 96374 THER/PROPH/DIAG INJ IV PUSH: CPT

## 2018-10-12 PROCEDURE — 36415 COLL VENOUS BLD VENIPUNCTURE: CPT

## 2018-10-12 PROCEDURE — 96375 TX/PRO/DX INJ NEW DRUG ADDON: CPT

## 2018-10-12 PROCEDURE — 25000003 PHARM REV CODE 250: Performed by: SURGERY

## 2018-10-12 PROCEDURE — 85025 COMPLETE CBC W/AUTO DIFF WBC: CPT

## 2018-10-12 PROCEDURE — 83690 ASSAY OF LIPASE: CPT

## 2018-10-12 PROCEDURE — 81003 URINALYSIS AUTO W/O SCOPE: CPT

## 2018-10-12 PROCEDURE — 99284 EMERGENCY DEPT VISIT MOD MDM: CPT | Mod: 25

## 2018-10-12 RX ORDER — SODIUM CHLORIDE 9 MG/ML
1000 INJECTION, SOLUTION INTRAVENOUS
Status: COMPLETED | OUTPATIENT
Start: 2018-10-12 | End: 2018-10-12

## 2018-10-12 RX ORDER — MORPHINE SULFATE 2 MG/ML
2 INJECTION, SOLUTION INTRAMUSCULAR; INTRAVENOUS
Status: COMPLETED | OUTPATIENT
Start: 2018-10-12 | End: 2018-10-12

## 2018-10-12 RX ORDER — ONDANSETRON 2 MG/ML
4 INJECTION INTRAMUSCULAR; INTRAVENOUS
Status: COMPLETED | OUTPATIENT
Start: 2018-10-12 | End: 2018-10-12

## 2018-10-12 RX ADMIN — ONDANSETRON 4 MG: 2 INJECTION INTRAMUSCULAR; INTRAVENOUS at 10:10

## 2018-10-12 RX ADMIN — SODIUM CHLORIDE 1000 ML: 0.9 INJECTION, SOLUTION INTRAVENOUS at 10:10

## 2018-10-12 RX ADMIN — MORPHINE SULFATE 2 MG: 2 INJECTION, SOLUTION INTRAMUSCULAR; INTRAVENOUS at 10:10

## 2018-10-13 VITALS
SYSTOLIC BLOOD PRESSURE: 134 MMHG | RESPIRATION RATE: 16 BRPM | WEIGHT: 244 LBS | OXYGEN SATURATION: 99 % | DIASTOLIC BLOOD PRESSURE: 78 MMHG | BODY MASS INDEX: 34.03 KG/M2 | HEART RATE: 82 BPM | TEMPERATURE: 99 F

## 2018-10-13 LAB
AMPHET+METHAMPHET UR QL: NEGATIVE
BARBITURATES UR QL SCN>200 NG/ML: NEGATIVE
BENZODIAZ UR QL SCN>200 NG/ML: NORMAL
BILIRUB UR QL STRIP: NEGATIVE
BZE UR QL SCN: NEGATIVE
CANNABINOIDS UR QL SCN: NEGATIVE
CLARITY UR: CLEAR
COLOR UR: YELLOW
CREAT UR-MCNC: 278 MG/DL
GLUCOSE UR QL STRIP: NEGATIVE
HGB UR QL STRIP: NEGATIVE
KETONES UR QL STRIP: ABNORMAL
LEUKOCYTE ESTERASE UR QL STRIP: NEGATIVE
METHADONE UR QL SCN>300 NG/ML: NEGATIVE
NITRITE UR QL STRIP: NEGATIVE
OPIATES UR QL SCN: NORMAL
PCP UR QL SCN>25 NG/ML: NEGATIVE
PH UR STRIP: 6 [PH] (ref 5–8)
PROT UR QL STRIP: NEGATIVE
SP GR UR STRIP: >=1.03 (ref 1–1.03)
TOXICOLOGY INFORMATION: NORMAL
URN SPEC COLLECT METH UR: ABNORMAL
UROBILINOGEN UR STRIP-ACNC: 1 EU/DL

## 2018-10-13 PROCEDURE — 96376 TX/PRO/DX INJ SAME DRUG ADON: CPT

## 2018-10-13 PROCEDURE — 63600175 PHARM REV CODE 636 W HCPCS: Performed by: SURGERY

## 2018-10-13 RX ORDER — KETOROLAC TROMETHAMINE 10 MG/1
10 TABLET, FILM COATED ORAL EVERY 6 HOURS PRN
Qty: 15 TABLET | Refills: 0 | Status: SHIPPED | OUTPATIENT
Start: 2018-10-13 | End: 2022-03-16

## 2018-10-13 RX ORDER — TAMSULOSIN HYDROCHLORIDE 0.4 MG/1
0.4 CAPSULE ORAL DAILY
Qty: 30 CAPSULE | Refills: 0 | Status: SHIPPED | OUTPATIENT
Start: 2018-10-13 | End: 2022-03-16

## 2018-10-13 RX ORDER — CIPROFLOXACIN 500 MG/1
500 TABLET ORAL 2 TIMES DAILY
Qty: 14 TABLET | Refills: 0 | Status: SHIPPED | OUTPATIENT
Start: 2018-10-13 | End: 2018-10-20

## 2018-10-13 RX ORDER — MORPHINE SULFATE 2 MG/ML
2 INJECTION, SOLUTION INTRAMUSCULAR; INTRAVENOUS
Status: COMPLETED | OUTPATIENT
Start: 2018-10-13 | End: 2018-10-13

## 2018-10-13 RX ADMIN — MORPHINE SULFATE 2 MG: 2 INJECTION, SOLUTION INTRAMUSCULAR; INTRAVENOUS at 12:10

## 2018-10-13 NOTE — TELEPHONE ENCOUNTER
"    Reason for Disposition   [1] SEVERE pain (e.g., excruciating, scale 8-10) AND [2] present > 1 hour    Protocols used:  FLANK PAIN-A-    Emmanuel's wife, Linda, said he was in the ED with kidney stone on 09/23/18.  Tonight, he has 8/10 right flank pain, nausea, has vomited once, and he is laying on the bathroom floor "it hurts so bad".  Per [facility] triage protocol, recommended he be taken to ED now for evaluation.  Linda will bring him to [facility] ED now in [place].  Message to Talha Cook MD, pcp via epic electronic fax.  Please contact caller directly with any additional care advice.    "

## 2018-10-13 NOTE — ED PROVIDER NOTES
[facility]                                                 Chief Complaint  28 y.o. male with Flank Pain (right)    History of Present Illness  Emmanuel Ervin Jr. presents to the emergency room with right flank pain  Patient has had on again off again right flank pain for last 2 days now  Patient has no fever, no hematuria or dysuria on ER presentation today  Patient states that he has a long history kidney stones, several procedures  Patient on exam has no obvious flank pain, no bladder pain on palpation  Pt states he sees [facility] urology, last kidney stone 2 months ago    The history is provided by the patient   device was not used during this ER visit  Medical history: Hypertension, kidney stones, UTI  Surgical history: Lithotripsy, skin graft, toe amputation  No Known Allergies     Review of Systems and Physical Exam      Review of Systems  -- Constitution - no fever, denies fatigue, no weakness, no chills  -- Eyes - no tearing or redness, no visual disturbance  -- Ear, Nose - no tinnitus or earache, no nasal congestion or discharge  -- Mouth,Throat - no sore throat, no toothache, normal voice, normal swallowing  -- Respiratory - denies cough and congestion, no shortness of breath, no GÓMEZ  -- Cardiovascular - denies chest pain, no palpitations, denies claudication  -- Gastrointestinal - denies abdominal pain, nausea, vomiting, or diarrhea  -- Genitourinary - right flank pain, no bladder pain, no hematuria or dysuria  -- Musculoskeletal - denies back pain, negative for myalgias and arthralgias   -- Neurological - no headache, denies weakness or seizure; no LOC  -- Skin - denies pallor, rash, or changes in skin. no hives or welts noted    Vital Signs  His oral temperature is 99 °F (37.2 °C).   His blood pressure is 152/102 and his pulse is 106.   His respiration is 20 and oxygen saturation is 99%.     Physical Exam  -- Nursing note and vitals reviewed  -- Constitutional:  Appears well-developed and well-nourished  -- Head: Atraumatic. Normocephalic. No obvious abnormality  -- Eyes: Pupils are equal and reactive to light. Normal conjunctiva and lids  -- Cardiac: Normal rate, regular rhythm and normal heart sounds  -- Pulmonary: Normal respiratory effort, breath sounds clear to auscultation  -- Abdominal: Soft, no tenderness. Normal bowel sounds. Normal liver edge  -- Genitourinary: no flank pain on exam, no suprapubic pain by palpation   -- Musculoskeletal: Normal range of motion, no effusions. Joints stable   -- Neurological: No focal deficits. Showed good interaction with staff  -- Skin: Warm and dry. No evidence of rash or cellulitis    Emergency Room Course      Lab Results     K 3.4 (L)      CO2 25   BUN 8   CREATININE 1.1   GLU 81   ALKPHOS 115   AST 31   ALT 34   BILITOT 0.9   ALBUMIN 4.4   PROT 8.2   WBC 9.20   HGB 16.1   HCT 47.7        Radiology  -- The CT stone protocol performed in the ER today was negative for acute pathology     Medications Given  morphine injection 2 mg (not administered)   0.9%  NaCl infusion (0 mLs Intravenous Stopped 10/12/18 2357)   morphine injection 2 mg (2 mg Intravenous Given 10/12/18 2247)   ondansetron injection 4 mg (4 mg Intravenous Given 10/12/18 2247)     Diagnosis  -- The encounter diagnosis was Right flank pain.    Disposition and Plan  -- Disposition: home  -- Condition: stable  -- Follow-up: Patient to follow up with Talha Cook MD in 1-2 days.  -- I advised the patient that we have found no life threatening condition today  -- At this time, I believe the patient is clinically stable for discharge.   -- The patient acknowledges that close follow up with a MD is required   -- Patient agrees to comply with all instruction and direction given in the ER    This note is dictated on Dragon Natural Speaking word recognition program.  There are word recognition mistakes that are occasionally missed on review.           Wang Fraser MD  10/13/18 0038

## 2018-10-13 NOTE — ED TRIAGE NOTES
28 y.o. male presents to ER   Chief Complaint   Patient presents with    Flank Pain     right   Pt reports flank pain and vomiting onset today. Pt reports kidney stone on right side from 3 weeks ago. No acute distress noted.

## 2019-09-19 NOTE — TELEPHONE ENCOUNTER
I offered them an appointment for tomorrow, She stated they are in the ed right now. They will follow up with me later today   left normal/right normal

## 2020-03-17 ENCOUNTER — TELEPHONE (OUTPATIENT)
Dept: UROLOGY | Facility: CLINIC | Age: 30
End: 2020-03-17

## 2020-03-17 DIAGNOSIS — N20.0 KIDNEY STONES: Primary | ICD-10-CM

## 2020-03-17 NOTE — TELEPHONE ENCOUNTER
Spoke to the patient's wife.  He had a CT scan done at [facility].  They do not have a copy of that currently.  None she describes renal stones but no ureteral stones.  I suggested that his visit would be worthless without a copy of the CT scan.  I offered rescheduling of the appointment versus them getting a copy of the disc between now and tomorrow.  She states she would like to reschedule.  She will call when she knows his work schedule.  Please cancel his appointment for tomorrow.

## 2021-02-18 ENCOUNTER — HOSPITAL ENCOUNTER (EMERGENCY)
Facility: HOSPITAL | Age: 31
Discharge: HOME | End: 2021-02-18
Attending: EMERGENCY MEDICINE
Payer: COMMERCIAL

## 2021-02-18 VITALS
HEART RATE: 80 BPM | BODY MASS INDEX: 34.03 KG/M2 | OXYGEN SATURATION: 100 % | HEIGHT: 71 IN | DIASTOLIC BLOOD PRESSURE: 96 MMHG | WEIGHT: 243.06 LBS | RESPIRATION RATE: 18 BRPM | SYSTOLIC BLOOD PRESSURE: 140 MMHG

## 2021-02-18 DIAGNOSIS — R07.81 RIB PAIN: Primary | ICD-10-CM

## 2021-02-18 DIAGNOSIS — R07.9 CHEST PAIN: ICD-10-CM

## 2021-02-18 PROCEDURE — 25000003 PHARM REV CODE 250: Performed by: EMERGENCY MEDICINE

## 2021-02-18 PROCEDURE — 99283 EMERGENCY DEPT VISIT LOW MDM: CPT | Mod: 25

## 2021-02-18 RX ORDER — HYDROCODONE BITARTRATE AND ACETAMINOPHEN 5; 325 MG/1; MG/1
1 TABLET ORAL
Status: COMPLETED | OUTPATIENT
Start: 2021-02-18 | End: 2021-02-18

## 2021-02-18 RX ORDER — HYDROCODONE BITARTRATE AND ACETAMINOPHEN 5; 325 MG/1; MG/1
1 TABLET ORAL EVERY 4 HOURS PRN
Qty: 15 TABLET | Refills: 0 | Status: SHIPPED | OUTPATIENT
Start: 2021-02-18 | End: 2022-03-16

## 2021-02-18 RX ADMIN — HYDROCODONE BITARTRATE AND ACETAMINOPHEN 1 TABLET: 5; 325 TABLET ORAL at 07:02

## 2021-03-02 ENCOUNTER — HOSPITAL ENCOUNTER (EMERGENCY)
Facility: HOSPITAL | Age: 31
Discharge: HOME | End: 2021-03-02
Attending: EMERGENCY MEDICINE
Payer: COMMERCIAL

## 2021-03-02 VITALS
HEART RATE: 93 BPM | SYSTOLIC BLOOD PRESSURE: 135 MMHG | RESPIRATION RATE: 18 BRPM | WEIGHT: 235.69 LBS | DIASTOLIC BLOOD PRESSURE: 87 MMHG | BODY MASS INDEX: 32.87 KG/M2 | OXYGEN SATURATION: 97 % | TEMPERATURE: 99 F

## 2021-03-02 DIAGNOSIS — U07.1 COVID-19 VIRUS INFECTION: Primary | ICD-10-CM

## 2021-03-02 DIAGNOSIS — R05.8 PRODUCTIVE COUGH: ICD-10-CM

## 2021-03-02 LAB
INFLUENZA A, MOLECULAR: NEGATIVE
INFLUENZA B, MOLECULAR: NEGATIVE
SARS-COV-2 RDRP RESP QL NAA+PROBE: POSITIVE
SPECIMEN SOURCE: NORMAL

## 2021-03-02 PROCEDURE — 99284 EMERGENCY DEPT VISIT MOD MDM: CPT | Mod: 25

## 2021-03-02 PROCEDURE — 87502 INFLUENZA DNA AMP PROBE: CPT

## 2021-03-02 PROCEDURE — U0002 COVID-19 LAB TEST NON-CDC: HCPCS

## 2021-03-02 RX ORDER — AZITHROMYCIN 250 MG/1
250 TABLET, FILM COATED ORAL DAILY
Qty: 6 TABLET | Refills: 0 | Status: SHIPPED | OUTPATIENT
Start: 2021-03-02 | End: 2022-03-16

## 2021-03-02 RX ORDER — METHYLPREDNISOLONE 4 MG/1
TABLET ORAL
Qty: 1 PACKAGE | Refills: 0 | Status: SHIPPED | OUTPATIENT
Start: 2021-03-02 | End: 2021-03-23

## 2021-03-02 RX ORDER — BENZONATATE 100 MG/1
100 CAPSULE ORAL 3 TIMES DAILY PRN
Qty: 20 CAPSULE | Refills: 0 | Status: SHIPPED | OUTPATIENT
Start: 2021-03-02 | End: 2021-03-12

## 2021-03-02 RX ORDER — ALBUTEROL SULFATE 90 UG/1
1-2 AEROSOL, METERED RESPIRATORY (INHALATION) EVERY 6 HOURS PRN
Qty: 6.7 G | Refills: 0 | Status: SHIPPED | OUTPATIENT
Start: 2021-03-02 | End: 2022-03-16

## 2021-03-03 ENCOUNTER — PATIENT MESSAGE (OUTPATIENT)
Dept: ADMINISTRATIVE | Facility: OTHER | Age: 31
End: 2021-03-03

## 2021-03-03 ENCOUNTER — TELEPHONE (OUTPATIENT)
Dept: ADMINISTRATIVE | Facility: CLINIC | Age: 31
End: 2021-03-03

## 2021-03-04 ENCOUNTER — PATIENT MESSAGE (OUTPATIENT)
Dept: ADMINISTRATIVE | Facility: OTHER | Age: 31
End: 2021-03-04

## 2021-03-05 ENCOUNTER — PATIENT MESSAGE (OUTPATIENT)
Dept: ADMINISTRATIVE | Facility: OTHER | Age: 31
End: 2021-03-05

## 2021-03-06 ENCOUNTER — PATIENT MESSAGE (OUTPATIENT)
Dept: ADMINISTRATIVE | Facility: OTHER | Age: 31
End: 2021-03-06

## 2021-03-07 ENCOUNTER — PATIENT MESSAGE (OUTPATIENT)
Dept: ADMINISTRATIVE | Facility: OTHER | Age: 31
End: 2021-03-07

## 2021-03-07 ENCOUNTER — NURSE TRIAGE (OUTPATIENT)
Dept: ADMINISTRATIVE | Facility: CLINIC | Age: 31
End: 2021-03-07

## 2021-03-08 ENCOUNTER — PATIENT MESSAGE (OUTPATIENT)
Dept: ADMINISTRATIVE | Facility: OTHER | Age: 31
End: 2021-03-08

## 2021-03-09 ENCOUNTER — PATIENT MESSAGE (OUTPATIENT)
Dept: ADMINISTRATIVE | Facility: CLINIC | Age: 31
End: 2021-03-09

## 2021-03-09 ENCOUNTER — PATIENT MESSAGE (OUTPATIENT)
Dept: ADMINISTRATIVE | Facility: OTHER | Age: 31
End: 2021-03-09

## 2021-03-10 ENCOUNTER — PATIENT MESSAGE (OUTPATIENT)
Dept: ADMINISTRATIVE | Facility: OTHER | Age: 31
End: 2021-03-10

## 2021-03-10 ENCOUNTER — PATIENT MESSAGE (OUTPATIENT)
Dept: ADMINISTRATIVE | Facility: CLINIC | Age: 31
End: 2021-03-10

## 2021-03-10 ENCOUNTER — NURSE TRIAGE (OUTPATIENT)
Dept: ADMINISTRATIVE | Facility: CLINIC | Age: 31
End: 2021-03-10

## 2021-03-11 ENCOUNTER — PATIENT MESSAGE (OUTPATIENT)
Dept: ADMINISTRATIVE | Facility: CLINIC | Age: 31
End: 2021-03-11

## 2021-03-11 ENCOUNTER — NURSE TRIAGE (OUTPATIENT)
Dept: ADMINISTRATIVE | Facility: CLINIC | Age: 31
End: 2021-03-11

## 2021-05-04 ENCOUNTER — PATIENT MESSAGE (OUTPATIENT)
Dept: RESEARCH | Facility: HOSPITAL | Age: 31
End: 2021-05-04

## 2021-05-10 ENCOUNTER — PATIENT MESSAGE (OUTPATIENT)
Dept: RESEARCH | Facility: HOSPITAL | Age: 31
End: 2021-05-10

## 2022-01-01 ENCOUNTER — HOSPITAL ENCOUNTER (EMERGENCY)
Facility: HOSPITAL | Age: 32
Discharge: HOME | End: 2022-01-01
Attending: SURGERY
Payer: COMMERCIAL

## 2022-01-01 VITALS
OXYGEN SATURATION: 98 % | SYSTOLIC BLOOD PRESSURE: 177 MMHG | HEART RATE: 98 BPM | TEMPERATURE: 98 F | WEIGHT: 230 LBS | BODY MASS INDEX: 32.2 KG/M2 | RESPIRATION RATE: 17 BRPM | HEIGHT: 71 IN | DIASTOLIC BLOOD PRESSURE: 106 MMHG

## 2022-01-01 DIAGNOSIS — S20.219A CONTUSION OF CHEST WALL, UNSPECIFIED LATERALITY, INITIAL ENCOUNTER: Primary | ICD-10-CM

## 2022-01-01 PROCEDURE — 63600175 PHARM REV CODE 636 W HCPCS: Performed by: NURSE PRACTITIONER

## 2022-01-01 PROCEDURE — 99284 EMERGENCY DEPT VISIT MOD MDM: CPT | Mod: 25

## 2022-01-01 PROCEDURE — 96372 THER/PROPH/DIAG INJ SC/IM: CPT

## 2022-01-01 PROCEDURE — 25000003 PHARM REV CODE 250: Performed by: NURSE PRACTITIONER

## 2022-01-01 RX ORDER — KETOROLAC TROMETHAMINE 30 MG/ML
30 INJECTION, SOLUTION INTRAMUSCULAR; INTRAVENOUS
Status: COMPLETED | OUTPATIENT
Start: 2022-01-01 | End: 2022-01-01

## 2022-01-01 RX ORDER — TRAMADOL HYDROCHLORIDE 50 MG/1
50 TABLET ORAL
Status: COMPLETED | OUTPATIENT
Start: 2022-01-01 | End: 2022-01-01

## 2022-01-01 RX ORDER — TRAMADOL HYDROCHLORIDE 50 MG/1
50 TABLET ORAL EVERY 6 HOURS PRN
Qty: 10 TABLET | Refills: 0 | Status: SHIPPED | OUTPATIENT
Start: 2022-01-01 | End: 2022-03-16

## 2022-01-01 RX ADMIN — TRAMADOL HYDROCHLORIDE 50 MG: 50 TABLET, FILM COATED ORAL at 12:01

## 2022-01-01 RX ADMIN — KETOROLAC TROMETHAMINE 30 MG: 30 INJECTION, SOLUTION INTRAMUSCULAR at 12:01

## 2022-01-01 NOTE — DISCHARGE INSTRUCTIONS
Please take pain medications as prescribed  Please follow-up with your primary care doctor symptoms do not improve  Please also take ibuprofen to help with inflammation.
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
## 2022-03-09 ENCOUNTER — TELEPHONE (OUTPATIENT)
Dept: UROLOGY | Facility: CLINIC | Age: 32
End: 2022-03-09
Payer: COMMERCIAL

## 2022-03-09 NOTE — TELEPHONE ENCOUNTER
I spoke with patient's wife, who agreed to appt date,time,location to see . patient's wife will bring CD of images. And NELLY Burns nurse stated she will call to get imaging report sent to us.      ----- Message from Rosalia Ochoa RN sent at 3/9/2022  1:44 PM CST -----  Thanks Rosalia James  ----- Message -----  From: Lopez Castro  Sent: 3/9/2022  12:12 PM CST  To: Felix [facility]wood Staff    Type:  Patient Returning Call    Who Called:Mrs arauz   Would the patient rather a call back or a response via [facility]? Call   Best Call Back Number: [phone]  Additional Information:     Checking to see if a referral was received   Dr Bebeto Humphreys office

## 2022-03-10 ENCOUNTER — HOSPITAL ENCOUNTER (EMERGENCY)
Facility: HOSPITAL | Age: 32
Discharge: HOME | End: 2022-03-10
Attending: SURGERY
Payer: COMMERCIAL

## 2022-03-10 VITALS
HEART RATE: 90 BPM | HEIGHT: 71 IN | WEIGHT: 249.63 LBS | RESPIRATION RATE: 17 BRPM | SYSTOLIC BLOOD PRESSURE: 149 MMHG | TEMPERATURE: 99 F | DIASTOLIC BLOOD PRESSURE: 85 MMHG | OXYGEN SATURATION: 97 % | BODY MASS INDEX: 34.95 KG/M2

## 2022-03-10 DIAGNOSIS — R10.9 RIGHT FLANK PAIN: Primary | ICD-10-CM

## 2022-03-10 DIAGNOSIS — N20.0 RIGHT KIDNEY STONE: ICD-10-CM

## 2022-03-10 LAB
ALBUMIN SERPL BCP-MCNC: 4.5 G/DL (ref 3.5–5.2)
ALP SERPL-CCNC: 105 U/L (ref 55–135)
ALT SERPL W/O P-5'-P-CCNC: 74 U/L (ref 10–44)
ANION GAP SERPL CALC-SCNC: 14 MMOL/L (ref 8–16)
AST SERPL-CCNC: 52 U/L (ref 10–40)
BACTERIA #/AREA URNS HPF: ABNORMAL /HPF
BASOPHILS # BLD AUTO: 0.07 K/UL (ref 0–0.2)
BASOPHILS NFR BLD: 0.6 % (ref 0–1.9)
BILIRUB SERPL-MCNC: 1 MG/DL (ref 0.1–1)
BILIRUB UR QL STRIP: NEGATIVE
BUN SERPL-MCNC: 15 MG/DL (ref 6–20)
CALCIUM SERPL-MCNC: 10.4 MG/DL (ref 8.7–10.5)
CHLORIDE SERPL-SCNC: 103 MMOL/L (ref 95–110)
CLARITY UR: CLEAR
CO2 SERPL-SCNC: 24 MMOL/L (ref 23–29)
COLOR UR: YELLOW
CREAT SERPL-MCNC: 1.1 MG/DL (ref 0.5–1.4)
DIFFERENTIAL METHOD: ABNORMAL
EOSINOPHIL # BLD AUTO: 0.1 K/UL (ref 0–0.5)
EOSINOPHIL NFR BLD: 0.5 % (ref 0–8)
ERYTHROCYTE [DISTWIDTH] IN BLOOD BY AUTOMATED COUNT: 13.6 % (ref 11.5–14.5)
EST. GFR  (AFRICAN AMERICAN): >60 ML/MIN/1.73 M^2
EST. GFR  (NON AFRICAN AMERICAN): >60 ML/MIN/1.73 M^2
GLUCOSE SERPL-MCNC: 85 MG/DL (ref 70–110)
GLUCOSE UR QL STRIP: NEGATIVE
HCT VFR BLD AUTO: 47.1 % (ref 40–54)
HGB BLD-MCNC: 15.7 G/DL (ref 14–18)
HGB UR QL STRIP: ABNORMAL
IMM GRANULOCYTES # BLD AUTO: 0.05 K/UL (ref 0–0.04)
IMM GRANULOCYTES NFR BLD AUTO: 0.4 % (ref 0–0.5)
KETONES UR QL STRIP: NEGATIVE
LEUKOCYTE ESTERASE UR QL STRIP: NEGATIVE
LIPASE SERPL-CCNC: 19 U/L (ref 4–60)
LYMPHOCYTES # BLD AUTO: 2.3 K/UL (ref 1–4.8)
LYMPHOCYTES NFR BLD: 20 % (ref 18–48)
MCH RBC QN AUTO: 28.8 PG (ref 27–31)
MCHC RBC AUTO-ENTMCNC: 33.3 G/DL (ref 32–36)
MCV RBC AUTO: 86 FL (ref 82–98)
MICROSCOPIC COMMENT: ABNORMAL
MONOCYTES # BLD AUTO: 1.1 K/UL (ref 0.3–1)
MONOCYTES NFR BLD: 9.4 % (ref 4–15)
NEUTROPHILS # BLD AUTO: 7.7 K/UL (ref 1.8–7.7)
NEUTROPHILS NFR BLD: 69.1 % (ref 38–73)
NITRITE UR QL STRIP: NEGATIVE
NRBC BLD-RTO: 0 /100 WBC
PH UR STRIP: 7 [PH] (ref 5–8)
PLATELET # BLD AUTO: 230 K/UL (ref 150–450)
PMV BLD AUTO: 11.8 FL (ref 9.2–12.9)
POTASSIUM SERPL-SCNC: 4.4 MMOL/L (ref 3.5–5.1)
PROT SERPL-MCNC: 8 G/DL (ref 6–8.4)
PROT UR QL STRIP: NEGATIVE
RBC # BLD AUTO: 5.46 M/UL (ref 4.6–6.2)
RBC #/AREA URNS HPF: 5 /HPF (ref 0–4)
SODIUM SERPL-SCNC: 141 MMOL/L (ref 136–145)
SP GR UR STRIP: 1.02 (ref 1–1.03)
URN SPEC COLLECT METH UR: ABNORMAL
UROBILINOGEN UR STRIP-ACNC: NEGATIVE EU/DL
WBC # BLD AUTO: 11.23 K/UL (ref 3.9–12.7)
WBC #/AREA URNS HPF: 1 /HPF (ref 0–5)

## 2022-03-10 PROCEDURE — 83690 ASSAY OF LIPASE: CPT | Performed by: NURSE PRACTITIONER

## 2022-03-10 PROCEDURE — 63600175 PHARM REV CODE 636 W HCPCS: Performed by: NURSE PRACTITIONER

## 2022-03-10 PROCEDURE — 25000003 PHARM REV CODE 250: Performed by: NURSE PRACTITIONER

## 2022-03-10 PROCEDURE — 96374 THER/PROPH/DIAG INJ IV PUSH: CPT

## 2022-03-10 PROCEDURE — 99285 EMERGENCY DEPT VISIT HI MDM: CPT | Mod: 25

## 2022-03-10 PROCEDURE — 96361 HYDRATE IV INFUSION ADD-ON: CPT

## 2022-03-10 PROCEDURE — 96375 TX/PRO/DX INJ NEW DRUG ADDON: CPT

## 2022-03-10 PROCEDURE — 81000 URINALYSIS NONAUTO W/SCOPE: CPT | Performed by: NURSE PRACTITIONER

## 2022-03-10 PROCEDURE — 85025 COMPLETE CBC W/AUTO DIFF WBC: CPT | Performed by: NURSE PRACTITIONER

## 2022-03-10 PROCEDURE — 80053 COMPREHEN METABOLIC PANEL: CPT | Performed by: NURSE PRACTITIONER

## 2022-03-10 PROCEDURE — 36415 COLL VENOUS BLD VENIPUNCTURE: CPT | Performed by: NURSE PRACTITIONER

## 2022-03-10 RX ORDER — SODIUM CHLORIDE 9 MG/ML
1000 INJECTION, SOLUTION INTRAVENOUS
Status: COMPLETED | OUTPATIENT
Start: 2022-03-10 | End: 2022-03-10

## 2022-03-10 RX ORDER — ONDANSETRON 2 MG/ML
4 INJECTION INTRAMUSCULAR; INTRAVENOUS
Status: COMPLETED | OUTPATIENT
Start: 2022-03-10 | End: 2022-03-10

## 2022-03-10 RX ORDER — KETOROLAC TROMETHAMINE 30 MG/ML
30 INJECTION, SOLUTION INTRAMUSCULAR; INTRAVENOUS
Status: COMPLETED | OUTPATIENT
Start: 2022-03-10 | End: 2022-03-10

## 2022-03-10 RX ORDER — IBUPROFEN 800 MG/1
800 TABLET ORAL EVERY 6 HOURS PRN
Qty: 20 TABLET | Refills: 0 | Status: SHIPPED | OUTPATIENT
Start: 2022-03-10

## 2022-03-10 RX ORDER — MORPHINE SULFATE 2 MG/ML
2 INJECTION, SOLUTION INTRAMUSCULAR; INTRAVENOUS
Status: COMPLETED | OUTPATIENT
Start: 2022-03-10 | End: 2022-03-10

## 2022-03-10 RX ORDER — CYCLOBENZAPRINE HCL 10 MG
10 TABLET ORAL 3 TIMES DAILY PRN
Qty: 10 TABLET | Refills: 0 | Status: SHIPPED | OUTPATIENT
Start: 2022-03-10 | End: 2022-03-16

## 2022-03-10 RX ADMIN — MORPHINE SULFATE 2 MG: 2 INJECTION, SOLUTION INTRAMUSCULAR; INTRAVENOUS at 08:03

## 2022-03-10 RX ADMIN — SODIUM CHLORIDE 1000 ML: 0.9 INJECTION, SOLUTION INTRAVENOUS at 07:03

## 2022-03-10 RX ADMIN — ONDANSETRON HYDROCHLORIDE 4 MG: 2 SOLUTION INTRAMUSCULAR; INTRAVENOUS at 08:03

## 2022-03-10 RX ADMIN — KETOROLAC TROMETHAMINE 30 MG: 30 INJECTION, SOLUTION INTRAMUSCULAR; INTRAVENOUS at 07:03

## 2022-03-10 NOTE — Clinical Note
"Emmanuel Trotter" Arcadio was seen and treated in our emergency department on 3/10/2022.  He may return to work on 03/14/2022.       If you have any questions or concerns, please don't hesitate to call.       RN    "

## 2022-03-10 NOTE — ED PROVIDER NOTES
Encounter Date: 3/10/2022       History     Chief Complaint   Patient presents with    Flank Pain     Right sided flank pain X 4 days     Emmanuel Ervin Jr. is a 31 y.o. male with PMH of hypertension, kidney stones, UTI who presents to the ED for evaluation of right flank pain.  Five day history of right flank pain that has been intermittent with previous history of kidney stones.  Pain worse today.  He presents with sharp, stabbing pain to right flank area with radiation into right lower quadrant of abdomen.  He currently rates his pain 8/10 in severity.  He denies alleviating or exacerbating factors.  He reports that he has been taking Toradol and ibuprofen 800 mg at home without relief of symptoms.  He denies urinary symptoms of dysuria, urgency, or frequency.  He does note that his urine is dark colored.  Denies fever, chills, or body aches.    The history is provided by the patient.     Review of patient's allergies indicates:  No Known Allergies  Past Medical History:   Diagnosis Date    Hypertension     Kidney stones     UTI (urinary tract infection)     diagnosed on 4/16/16     Past Surgical History:   Procedure Laterality Date    EXTRACORPOREAL SHOCK WAVE LITHOTRIPSY  2016    multiple since 2009    SKIN GRAFT Right     s/p traumatic injury to foot at 1 y/o    TOE AMPUTATION Right     due to  accident at 1 y/o     Family History   Problem Relation Age of Onset    Diabetes Maternal Aunt     Heart disease Maternal Aunt     Diabetes Maternal Grandmother     Hypertension Maternal Grandmother     Heart disease Maternal Grandmother     Prostate cancer Neg Hx     Kidney disease Neg Hx      Social History     Tobacco Use    Smoking status: Never Smoker    Smokeless tobacco: Never Used   Substance Use Topics    Alcohol use: No    Drug use: No     Review of Systems   Constitutional: Negative for appetite change, chills and fever.   HENT: Negative for congestion, ear discharge, ear pain,  postnasal drip, rhinorrhea and sore throat.    Respiratory: Negative for cough, chest tightness and shortness of breath.    Cardiovascular: Negative for chest pain.   Gastrointestinal: Positive for nausea. Negative for abdominal distention and abdominal pain.   Genitourinary: Positive for flank pain (right ). Negative for dysuria, hematuria and urgency.   Musculoskeletal: Negative for arthralgias and back pain.   Skin: Negative for rash.   Neurological: Negative for dizziness, weakness, numbness and headaches.   Hematological: Does not bruise/bleed easily.       Physical Exam     Initial Vitals   BP Pulse Resp Temp SpO2   03/10/22 1800 03/10/22 1800 03/10/22 1758 03/10/22 1800 03/10/22 1800   (!) 180/121 97 20 (!) 100.4 °F (38 °C) 97 %      MAP       --                Physical Exam    Constitutional: He appears well-developed and well-nourished.   HENT:   Head: Normocephalic and atraumatic.   Right Ear: Tympanic membrane, external ear and ear canal normal.   Left Ear: Tympanic membrane, external ear and ear canal normal.   Eyes: Conjunctivae and EOM are normal. Pupils are equal, round, and reactive to light.   Neck: Neck supple.   Cardiovascular: Normal rate, regular rhythm, normal heart sounds and intact distal pulses.   Pulmonary/Chest: Breath sounds normal.   Abdominal: Abdomen is soft. Bowel sounds are normal. There is abdominal tenderness.   There is right CVA tenderness.  Musculoskeletal:         General: Normal range of motion.      Cervical back: Neck supple.     Neurological: He is alert and oriented to person, place, and time. He has normal strength.   Skin: Skin is warm and dry.   Psychiatric: He has a normal mood and affect. His behavior is normal. Judgment and thought content normal.         ED Course   Procedures  Labs Reviewed   CBC W/ AUTO DIFFERENTIAL - Abnormal; Notable for the following components:       Result Value    Immature Grans (Abs) 0.05 (*)     Mono # 1.1 (*)     All other components  within normal limits   COMPREHENSIVE METABOLIC PANEL - Abnormal; Notable for the following components:    AST 52 (*)     ALT 74 (*)     All other components within normal limits   URINALYSIS, REFLEX TO URINE CULTURE - Abnormal; Notable for the following components:    Occult Blood UA 1+ (*)     All other components within normal limits    Narrative:     Specimen Source->Urine   URINALYSIS MICROSCOPIC - Abnormal; Notable for the following components:    RBC, UA 5 (*)     All other components within normal limits    Narrative:     Specimen Source->Urine   LIPASE          Imaging Results          CT Renal Stone Study ABD Pelvis WO (Final result)  Result time 03/10/22 18:41:38    Final result by Radha Johns MD (03/10/22 18:41:38)                 Impression:      Nonobstructing right renal stones.  No hydronephrosis, opaque ureteral stone or ureteral obstruction evident.    Additional findings as detailed above including diffuse fatty infiltration of the liver.      Electronically signed by: Radha Johns MD  Date:    03/10/2022  Time:    18:41             Narrative:    EXAMINATION:  CT RENAL STONE STUDY ABD PELVIS WO    CLINICAL HISTORY:  Flank pain, kidney stone suspected;    TECHNIQUE:  Low dose axial images, sagittal and coronal reformations were obtained from the lung bases to the pubic symphysis.  Contrast was not administered.    COMPARISON:  10/12/2018    FINDINGS:  Diffuse fatty infiltration of the liver.  No calcified stones the gallbladder or CT findings of acute cholecystitis.  No biliary duct dilatation.  Spleen not enlarged.  Adrenal glands and pancreas unremarkable appearance.  Abdominal aorta tapers without aneurysmal dilatation    There is small fat containing umbilical hernia knuckle of bowel extending into the neck of the hernia without bowel obstructive or inflammatory change.  Normal appearance of the appendix.  No free intraperitoneal air or fluid.    Kidneys, ureters, bladder; punctate  stone mid right kidney.  7 mm lower pole right renal stone.  No opaque left renal stone.  No opaque ureteral stone, ureteral dilatation or ureteral obstruction on either side.  Urinary bladder mildly distended at time of the exam and as visualized unremarkable appearance    Osseous structures show degenerative changes    The stone burden the right kidney is increased compared to the prior study of 10/12/2018                                 Medications   morphine injection 2 mg (has no administration in time range)   ondansetron injection 4 mg (has no administration in time range)   0.9%  NaCl infusion (1,000 mLs Intravenous New Bag 3/10/22 1917)   ketorolac injection 30 mg (30 mg Intravenous Given 3/10/22 1916)     Medical Decision Making:   Differential Diagnosis:   Kidney stone, UTI, pyelonephritis  Clinical Tests:   Lab Tests: Ordered and Reviewed  Radiological Study: Ordered and Reviewed  ED Management:  Evaluation of a 31-year-old male with right flank pain, history of kidney stones.  He has right CVA tenderness on exam.  Lab workup, no leukocytosis.  UA with 1+ blood.  CT stone study shows Nonobstructing right renal stones.  No hydronephrosis, opaque ureteral stone or ureteral obstruction evident.  Patient given IV fluids in the ED in addition to pain control.  He will be discharged home, close follow-up with Urology.  Strict return precautions discussed with voiced understanding.                      Clinical Impression:   Final diagnoses:  [R10.9] Right flank pain (Primary)  [N20.0] Right kidney stone          ED Disposition Condition    Discharge Stable        ED Prescriptions     Medication Sig Dispense Start Date End Date Auth. Provider    ibuprofen (ADVIL,MOTRIN) 800 MG tablet Take 1 tablet (800 mg total) by mouth every 6 (six) hours as needed for Pain. 20 tablet 3/10/2022  Wang Fraser MD    cyclobenzaprine (FLEXERIL) 10 MG tablet Take 1 tablet (10 mg total) by mouth 3 (three) times daily as needed  for Muscle spasms. 10 tablet 3/10/2022 3/15/2022 Wang Fraser MD        Follow-up Information     Follow up With Specialties Details Why Contact Info    Talha Cook MD Family Medicine Schedule an appointment as soon as possible for a visit in 2 days  [address]  [facility]  [place] [address]  [phone]             Sudha Humphries NP  03/10/22 1957

## 2022-03-10 NOTE — ED TRIAGE NOTES
31 y.o. male presents to ER qTrack 03/qTrk 03   Chief Complaint   Patient presents with    Flank Pain     Right sided flank pain X 4 days   . No acute distress noted.  PT states he is not currently taking BP medication

## 2022-03-11 ENCOUNTER — NURSE TRIAGE (OUTPATIENT)
Dept: ADMINISTRATIVE | Facility: CLINIC | Age: 32
End: 2022-03-11
Payer: COMMERCIAL

## 2022-03-11 NOTE — ED NOTES
Assumed care of pt. ROSEANNA Fuentes at bedside attempting PIV access. Pt c/o pain 9/10 at this time. Updated pt on POC. VU. Will continue to monitor.

## 2022-03-11 NOTE — ED NOTES
Pt provided a urine speciman cup, castile soap towelette wipe, and instructions for [facility]; pt verbalizes understanding.  Will continue to monitor.

## 2022-03-12 ENCOUNTER — HOSPITAL ENCOUNTER (EMERGENCY)
Facility: HOSPITAL | Age: 32
Discharge: HOME | End: 2022-03-12
Attending: STUDENT IN AN ORGANIZED HEALTH CARE EDUCATION/TRAINING PROGRAM
Payer: COMMERCIAL

## 2022-03-12 ENCOUNTER — NURSE TRIAGE (OUTPATIENT)
Dept: ADMINISTRATIVE | Facility: CLINIC | Age: 32
End: 2022-03-12
Payer: COMMERCIAL

## 2022-03-12 VITALS
DIASTOLIC BLOOD PRESSURE: 104 MMHG | TEMPERATURE: 97 F | HEART RATE: 72 BPM | OXYGEN SATURATION: 95 % | WEIGHT: 248.81 LBS | BODY MASS INDEX: 34.7 KG/M2 | SYSTOLIC BLOOD PRESSURE: 154 MMHG | RESPIRATION RATE: 20 BRPM

## 2022-03-12 DIAGNOSIS — R10.9 RIGHT FLANK PAIN: Primary | ICD-10-CM

## 2022-03-12 LAB
ALBUMIN SERPL BCP-MCNC: 4.5 G/DL (ref 3.5–5.2)
ALP SERPL-CCNC: 107 U/L (ref 55–135)
ALT SERPL W/O P-5'-P-CCNC: 88 U/L (ref 10–44)
ANION GAP SERPL CALC-SCNC: 15 MMOL/L (ref 8–16)
AST SERPL-CCNC: 60 U/L (ref 10–40)
BASOPHILS # BLD AUTO: 0.05 K/UL (ref 0–0.2)
BASOPHILS NFR BLD: 0.5 % (ref 0–1.9)
BILIRUB SERPL-MCNC: 1.3 MG/DL (ref 0.1–1)
BILIRUB UR QL STRIP: NEGATIVE
BUN SERPL-MCNC: 13 MG/DL (ref 6–20)
CALCIUM SERPL-MCNC: 10 MG/DL (ref 8.7–10.5)
CHLORIDE SERPL-SCNC: 103 MMOL/L (ref 95–110)
CLARITY UR: CLEAR
CO2 SERPL-SCNC: 22 MMOL/L (ref 23–29)
COLOR UR: YELLOW
CREAT SERPL-MCNC: 1.2 MG/DL (ref 0.5–1.4)
DIFFERENTIAL METHOD: NORMAL
EOSINOPHIL # BLD AUTO: 0.1 K/UL (ref 0–0.5)
EOSINOPHIL NFR BLD: 0.9 % (ref 0–8)
ERYTHROCYTE [DISTWIDTH] IN BLOOD BY AUTOMATED COUNT: 13.3 % (ref 11.5–14.5)
EST. GFR  (AFRICAN AMERICAN): >60 ML/MIN/1.73 M^2
EST. GFR  (NON AFRICAN AMERICAN): >60 ML/MIN/1.73 M^2
GLUCOSE SERPL-MCNC: 96 MG/DL (ref 70–110)
GLUCOSE UR QL STRIP: NEGATIVE
HCT VFR BLD AUTO: 48.9 % (ref 40–54)
HGB BLD-MCNC: 16.2 G/DL (ref 14–18)
HGB UR QL STRIP: ABNORMAL
IMM GRANULOCYTES # BLD AUTO: 0.04 K/UL (ref 0–0.04)
IMM GRANULOCYTES NFR BLD AUTO: 0.4 % (ref 0–0.5)
KETONES UR QL STRIP: NEGATIVE
LEUKOCYTE ESTERASE UR QL STRIP: NEGATIVE
LYMPHOCYTES # BLD AUTO: 3 K/UL (ref 1–4.8)
LYMPHOCYTES NFR BLD: 27.9 % (ref 18–48)
MCH RBC QN AUTO: 28.2 PG (ref 27–31)
MCHC RBC AUTO-ENTMCNC: 33.1 G/DL (ref 32–36)
MCV RBC AUTO: 85 FL (ref 82–98)
MONOCYTES # BLD AUTO: 0.9 K/UL (ref 0.3–1)
MONOCYTES NFR BLD: 8.4 % (ref 4–15)
NEUTROPHILS # BLD AUTO: 6.6 K/UL (ref 1.8–7.7)
NEUTROPHILS NFR BLD: 61.9 % (ref 38–73)
NITRITE UR QL STRIP: NEGATIVE
NRBC BLD-RTO: 0 /100 WBC
PH UR STRIP: 7 [PH] (ref 5–8)
PLATELET # BLD AUTO: 232 K/UL (ref 150–450)
PMV BLD AUTO: 11.5 FL (ref 9.2–12.9)
POTASSIUM SERPL-SCNC: 4 MMOL/L (ref 3.5–5.1)
PROT SERPL-MCNC: 8.1 G/DL (ref 6–8.4)
PROT UR QL STRIP: NEGATIVE
RBC # BLD AUTO: 5.75 M/UL (ref 4.6–6.2)
SODIUM SERPL-SCNC: 140 MMOL/L (ref 136–145)
SP GR UR STRIP: 1.02 (ref 1–1.03)
URN SPEC COLLECT METH UR: ABNORMAL
UROBILINOGEN UR STRIP-ACNC: NEGATIVE EU/DL
WBC # BLD AUTO: 10.7 K/UL (ref 3.9–12.7)

## 2022-03-12 PROCEDURE — 99285 EMERGENCY DEPT VISIT HI MDM: CPT | Mod: 25

## 2022-03-12 PROCEDURE — 81003 URINALYSIS AUTO W/O SCOPE: CPT | Performed by: STUDENT IN AN ORGANIZED HEALTH CARE EDUCATION/TRAINING PROGRAM

## 2022-03-12 PROCEDURE — 63600175 PHARM REV CODE 636 W HCPCS: Performed by: STUDENT IN AN ORGANIZED HEALTH CARE EDUCATION/TRAINING PROGRAM

## 2022-03-12 PROCEDURE — 96375 TX/PRO/DX INJ NEW DRUG ADDON: CPT

## 2022-03-12 PROCEDURE — 85025 COMPLETE CBC W/AUTO DIFF WBC: CPT | Performed by: STUDENT IN AN ORGANIZED HEALTH CARE EDUCATION/TRAINING PROGRAM

## 2022-03-12 PROCEDURE — 80053 COMPREHEN METABOLIC PANEL: CPT | Performed by: STUDENT IN AN ORGANIZED HEALTH CARE EDUCATION/TRAINING PROGRAM

## 2022-03-12 PROCEDURE — 96374 THER/PROPH/DIAG INJ IV PUSH: CPT

## 2022-03-12 RX ORDER — KETOROLAC TROMETHAMINE 30 MG/ML
15 INJECTION, SOLUTION INTRAMUSCULAR; INTRAVENOUS
Status: COMPLETED | OUTPATIENT
Start: 2022-03-12 | End: 2022-03-12

## 2022-03-12 RX ORDER — OXYCODONE AND ACETAMINOPHEN 5; 325 MG/1; MG/1
1 TABLET ORAL EVERY 4 HOURS PRN
Qty: 8 TABLET | Refills: 0 | Status: SHIPPED | OUTPATIENT
Start: 2022-03-12 | End: 2022-03-16

## 2022-03-12 RX ORDER — LIDOCAINE 50 MG/G
1 PATCH TOPICAL DAILY
Qty: 30 EACH | Refills: 0 | Status: SHIPPED | OUTPATIENT
Start: 2022-03-12

## 2022-03-12 RX ORDER — MORPHINE SULFATE 2 MG/ML
6 INJECTION, SOLUTION INTRAMUSCULAR; INTRAVENOUS
Status: COMPLETED | OUTPATIENT
Start: 2022-03-12 | End: 2022-03-12

## 2022-03-12 RX ADMIN — MORPHINE SULFATE 6 MG: 2 INJECTION, SOLUTION INTRAMUSCULAR; INTRAVENOUS at 11:03

## 2022-03-12 RX ADMIN — KETOROLAC TROMETHAMINE 15 MG: 30 INJECTION, SOLUTION INTRAMUSCULAR; INTRAVENOUS at 11:03

## 2022-03-12 NOTE — ED PROVIDER NOTES
"Encounter Date: 3/12/2022       History     Chief Complaint   Patient presents with    Flank Pain     Diagnosed with a right renal stone on 3/10/22. Patient with severe pain and nausea. Patient's spouse reports patient has been "real sweaty."     31-year-old male with history of prior urinary tract infections and kidney stones, presenting with significant right flank pain.  Patient was diagnosed with a right renal stone two days ago, but there was no obstruction on CT scan.  Patient reports that since then pain has gotten significantly worse.  Denies fever, body aches.  Patient reports he has been taking Toradol and Flexeril without any improvement in pain.  No other complaints.        Review of patient's allergies indicates:  No Known Allergies  Past Medical History:   Diagnosis Date    Hypertension     Kidney stones     UTI (urinary tract infection)     diagnosed on 4/16/16     Past Surgical History:   Procedure Laterality Date    EXTRACORPOREAL SHOCK WAVE LITHOTRIPSY  2016    multiple since 2009    SKIN GRAFT Right     s/p traumatic injury to foot at 1 y/o    TOE AMPUTATION Right     due to  accident at 1 y/o     Family History   Problem Relation Age of Onset    Diabetes Maternal Aunt     Heart disease Maternal Aunt     Diabetes Maternal Grandmother     Hypertension Maternal Grandmother     Heart disease Maternal Grandmother     Prostate cancer Neg Hx     Kidney disease Neg Hx      Social History     Tobacco Use    Smoking status: Never Smoker    Smokeless tobacco: Never Used   Substance Use Topics    Alcohol use: No    Drug use: No     Review of Systems   Constitutional: Negative for fever.   HENT: Negative for sore throat.    Respiratory: Negative for shortness of breath.    Cardiovascular: Negative for chest pain.   Gastrointestinal: Negative for nausea.   Genitourinary: Positive for flank pain. Negative for dysuria.   Musculoskeletal: Negative for back pain.   Skin: Negative for " rash.   Neurological: Negative for weakness.   Hematological: Does not bruise/bleed easily.       Physical Exam     Initial Vitals [03/12/22 1145]   BP Pulse Resp Temp SpO2   (!) 191/110 86 20 96.7 °F (35.9 °C) 97 %      MAP       --         Physical Exam    Nursing note and vitals reviewed.  Constitutional: He appears well-developed.   Eyes: EOM are normal.   Neck:   Normal range of motion.  Cardiovascular:   No murmur heard.  Pulmonary/Chest: No respiratory distress.   Abdominal: He exhibits no distension.   Positive right CVA tenderness.  No left CVA tenderness.  No abdominal tenderness.   Musculoskeletal:         General: Normal range of motion.      Cervical back: Normal range of motion.     Neurological: He is alert.   Skin: Skin is warm.   Psychiatric: He has a normal mood and affect.         ED Course   Procedures  Labs Reviewed   URINALYSIS, REFLEX TO URINE CULTURE - Abnormal; Notable for the following components:       Result Value    Occult Blood UA Trace (*)     All other components within normal limits    Narrative:     Specimen Source->Urine   COMPREHENSIVE METABOLIC PANEL - Abnormal; Notable for the following components:    CO2 22 (*)     Total Bilirubin 1.3 (*)     AST 60 (*)     ALT 88 (*)     All other components within normal limits   CBC W/ AUTO DIFFERENTIAL          Imaging Results          CT Renal Stone Study ABD Pelvis WO (Final result)  Result time 03/12/22 12:33:53    Final result by Agata Bautista MD (03/12/22 12:33:53)                 Impression:      The patient has 2 right renal calculi which are unchanged in position from prior exam.  There is no evidence hydronephrosis or obstructing renal calculus.  There is minimal bilateral perinephric stranding.    There is hepatic steatosis.      Electronically signed by: Agata Bautista MD  Date:    03/12/2022  Time:    12:33             Narrative:    EXAMINATION:  CT RENAL STONE STUDY ABD PELVIS WO    CLINICAL HISTORY:  Flank pain, kidney  stone suspected;Significantly worsening pain, would like to assess full stranding surrounding kidney, and extent of obstruction.;    TECHNIQUE:  Low dose axial images, sagittal and coronal reformations were obtained from the lung bases to the pubic symphysis.  Contrast was not administered.    COMPARISON:  03/10/2022    FINDINGS:  There is hepatic steatosis.  The spleen, adrenal glands and pancreas are unremarkable.    There are nonobstructing right renal calculi the largest of which is seen in the inferior pole and measures 5 mm.  There is no evidence hydronephrosis.  There is no evidence left renal calculus..    The gallbladder is in place.    There is no evidence bowel obstruction.  The appendix is within normal limits.    There is no evidence intra-abdominal free fluid or free air.  There is no evidence of abdominal nor pelvic lymphadenopathy.    The osseous structures are unremarkable.                                 Medications   ketorolac injection 15 mg (15 mg Intravenous Given 3/12/22 1158)   morphine injection 6 mg (6 mg Intravenous Given 3/12/22 1159)     Medical Decision Making:   Differential Diagnosis:   DDX:  Likely kidney stone pain given recent CT scan.  Will reimage given change in nature and strength pain.  Will rule out infection as cause for worsening pain.  Will check basic labs to rule out worsening kidney function.  DX:  CBC, CMP, CT, UA  TX:  Analgesia PRN  Dispo:  Pending workup               ED Course as of 03/12/22 1255   Sat Mar 12, 2022   1232 NITRITE UA: Negative [NB]   1232 Leukocytes, UA: Negative [NB]   1232 WBC: 10.70 [NB]   1250 Patient has no urinary tract infection.  No elevated white count sign of infection.  Given the patient's CT shows that patient does not have an obstructing kidney stone, I have a high suspicion that this is a muscular issue.  Patient has no midline tenderness, no urinary retention, loss of bowel or bladder control, or saddle anesthesia.  I will prescribe  patient lidocaine patches, give pain control, and have follow-up with Orthopedics. [NB]      ED Course User Index  [NB] Kem So MD             Clinical Impression:   Final diagnoses:  [R10.9] Right flank pain (Primary)          ED Disposition Condition    Discharge Stable        ED Prescriptions     Medication Sig Dispense Start Date End Date Auth. Provider    LIDOcaine (LIDODERM) 5 % Place 1 patch onto the skin once daily. Remove & Discard patch within 12 hours or as directed by MD 30 each 3/12/2022  Kem So MD    oxyCODONE-acetaminophen (PERCOCET) 5-325 mg per tablet Take 1 tablet by mouth every 4 (four) hours as needed for Pain. 8 tablet 3/12/2022  Kem So MD        Follow-up Information     Follow up With Specialties Details Why Contact Info    Talha Cook MD Family Medicine Schedule an appointment as soon as possible for a visit in 2 days  [address]  [facility]  [place] [id number] [phone]      [facility] - Emergency Dept Emergency Medicine  If symptoms worsen [facility] [facility] [address]  [phone]           Kem So MD  03/12/22 1150       Kem So MD  03/12/22 0440

## 2022-03-12 NOTE — TELEPHONE ENCOUNTER
Reason for Disposition   MODERATE pain (e.g., interferes with normal activities or awakens from sleep)    Additional Information   Negative: Passed out (i.e., lost consciousness, collapsed and was not responding)   Negative: Shock suspected (e.g., cold/pale/clammy skin, too weak to stand, low BP, rapid pulse)   Negative: Difficult to awaken or acting confused (e.g., disoriented, slurred speech)   Negative: Sounds like a life-threatening emergency to the triager   Negative: Followed a major injury to the back (e.g., MVA, fall > 10 feet or 3 meters, penetrating injury, etc.)   Negative: Back pain or flank pain during pregnancy   Negative: Upper, mid or lower back pain that occurs mainly in the midline   Negative: [1] SEVERE pain (e.g., excruciating, scale 8-10) AND [2] present > 1 hour   Negative: [1] SEVERE pain (e.g., excruciating, scale 8-10) AND [2] not improved after pain medicine   Negative: [1] Sudden onset of severe flank pain AND [2] age > 60 years   Negative: [1] Abdominal pain AND [2] age > 60 years   Negative: [1] Unable to urinate (or only a few drops) > 4 hours AND [2] bladder feels very full (e.g., palpable bladder or strong urge to urinate)     Strong urine stream, feels he is emptying bladder completely with each urination   Negative: Vomiting   Negative: Weakness of a leg or foot (e.g., unable to bear weight, dragging foot)   Negative: Patient sounds very sick or weak to the triager   Negative: Fever > 100.4 F (38.0 C)   Negative: Pain or burning with passing urine (urination)   Commented on: Blood in urine (red, pink, or tea-colored)     Light yellow urine    Protocols used: FLANK PAIN-KARAN-ASHIA Benitez was seen in ED [facility] 03/10/2022.  Kiidney stones. Second time having stones.  Still having right flank pain.  No fever, pain steady 6-7 of 10, taking ibuprofen 800 mg every 8 hours.  Pain somewhat better than it was in the ED, but still tossing and turning all night, sleep  quality poor. Some mild nausea.  Recommend ibuprofen with food.  Urine clear and  light yellow, good stream, no abdominal pain. Appointment with Dr Cuco Washington Wednesday for consult.  Per [facility] triage protocol, recommend he be seen within 24 hours for evaluation.  He will discuss with Linda, wife, and they will decide if [place] [facility] [place].  Care advice given.  Message to Dr Washington.  Please contact caller directly with any additional care advice.

## 2022-03-12 NOTE — TELEPHONE ENCOUNTER
Reason for Disposition   MODERATE pain (e.g., interferes with normal activities or awakens from sleep)    Additional Information   Negative: Passed out (i.e., lost consciousness, collapsed and was not responding)   Negative: Shock suspected (e.g., cold/pale/clammy skin, too weak to stand, low BP, rapid pulse)   Negative: Difficult to awaken or acting confused (e.g., disoriented, slurred speech)   Negative: Sounds like a life-threatening emergency to the triager   Negative: Followed a major injury to the back (e.g., MVA, fall > 10 feet or 3 meters, penetrating injury, etc.)   Negative: Back pain or flank pain during pregnancy   Negative: Upper, mid or lower back pain that occurs mainly in the midline   Negative: [1] SEVERE pain (e.g., excruciating, scale 8-10) AND [2] present > 1 hour   Negative: [1] SEVERE pain (e.g., excruciating, scale 8-10) AND [2] not improved after pain medicine   Negative: [1] Sudden onset of severe flank pain AND [2] age > 60 years   Negative: [1] Abdominal pain AND [2] age > 60 years   Negative: [1] Unable to urinate (or only a few drops) > 4 hours AND [2] bladder feels very full (e.g., palpable bladder or strong urge to urinate)   Negative: Vomiting   Negative: Weakness of a leg or foot (e.g., unable to bear weight, dragging foot)   Negative: Patient sounds very sick or weak to the triager   Negative: Fever > 100.4 F (38.0 C)   Negative: Pain or burning with passing urine (urination)    Protocols used: FLANK PAIN-A-AH  spouse called re seen at [facility] for kidney stone. got meds prescribed. pt still in a lot of pain. rates pain 6 constant, afeb. no blood in urine. having nausea. BP elevated. rec to see dr within 24 hours. Caller agrees. Offered and accepted O[facility]. Call back with questions

## 2022-03-12 NOTE — Clinical Note
"Emmanuel Nascimentoabdelrahman Ervin was seen and treated in our emergency department on 3/12/2022.  He may return to work on 03/16/2022.  Until the patient is able to have an appointment with an Orthopedist.     If you have any questions or concerns, please don't hesitate to call.      Glynn TRAN RN    "

## 2022-03-14 ENCOUNTER — TELEPHONE (OUTPATIENT)
Dept: ORTHOPEDICS | Facility: CLINIC | Age: 32
End: 2022-03-14
Payer: COMMERCIAL

## 2022-03-14 ENCOUNTER — TELEPHONE (OUTPATIENT)
Dept: UROLOGY | Facility: CLINIC | Age: 32
End: 2022-03-14
Payer: COMMERCIAL

## 2022-03-14 DIAGNOSIS — M51.36 DDD (DEGENERATIVE DISC DISEASE), LUMBAR: Primary | ICD-10-CM

## 2022-03-14 NOTE — TELEPHONE ENCOUNTER
1818-I spoke to pt wife, Linda, - will leave appt for Wed 3/16/22 as scheduled per her request.    ----- Message from Rosalia Ochoa RN sent at 3/14/2022  5:15 PM CDT -----  Regarding: need appt 3/15/22 if pt answers the phone    ----- Message -----  From: Jaqueline Mims  Sent: 3/14/2022  11:49 AM CDT  To: [facility] Staff    Type:  Sooner Apoointment Request    Caller is requesting a sooner appointment.  Caller declined first available appointment listed below.  Caller will not accept being placed on the waitlist and is requesting a message be sent to doctor.  Name of Caller:pt wife  When is the first available appointment?03/16/22  Symptoms:pt is having a lot of pain on his right side pt would like to come in tomorrow if possible  Would the patient rather a call back or a response via [facility]? Please call  Best Call Back Number:[phone]  Additional Information:

## 2022-03-15 ENCOUNTER — PATIENT MESSAGE (OUTPATIENT)
Dept: UROLOGY | Facility: CLINIC | Age: 32
End: 2022-03-15
Payer: COMMERCIAL

## 2022-03-15 ENCOUNTER — TELEPHONE (OUTPATIENT)
Dept: UROLOGY | Facility: CLINIC | Age: 32
End: 2022-03-15
Payer: COMMERCIAL

## 2022-03-15 NOTE — TELEPHONE ENCOUNTER
I sent message in Portal earlier today suggesting pt go to the Emergency Room.  ( spoke to pt wife last night and she declined appt for today and pt still has appt for tomorrow).  Pt will come to appt tomorrow.    ----- Message from Kaylah Gerardo sent at 3/15/2022 12:26 PM CDT -----  Regarding: Pt in Severe Pain  Type:  Needs Medical Advice    Who Called: pt mother    Symptoms (please be specific): no eating or sleeping due to pain on lower stomach and back, worsen when he empties bladder    How long has patient had these symptoms:  8 days    Would the patient rather a call back or a response via [facility]? call    Best Call Back Number: [phone]    Additional Information: would like to be seen today if possible

## 2022-03-16 ENCOUNTER — OFFICE VISIT (OUTPATIENT)
Dept: ORTHOPEDICS | Facility: CLINIC | Age: 32
End: 2022-03-16
Payer: COMMERCIAL

## 2022-03-16 ENCOUNTER — HOSPITAL ENCOUNTER (OUTPATIENT)
Dept: RADIOLOGY | Facility: HOSPITAL | Age: 32
Discharge: HOME | End: 2022-03-16
Attending: PHYSICIAN ASSISTANT
Payer: COMMERCIAL

## 2022-03-16 ENCOUNTER — OFFICE VISIT (OUTPATIENT)
Dept: UROLOGY | Facility: CLINIC | Age: 32
End: 2022-03-16
Payer: COMMERCIAL

## 2022-03-16 VITALS
HEIGHT: 71 IN | BODY MASS INDEX: 35 KG/M2 | DIASTOLIC BLOOD PRESSURE: 101 MMHG | SYSTOLIC BLOOD PRESSURE: 148 MMHG | WEIGHT: 250 LBS | HEART RATE: 88 BPM

## 2022-03-16 VITALS — BODY MASS INDEX: 35.28 KG/M2 | WEIGHT: 252 LBS | HEIGHT: 71 IN

## 2022-03-16 DIAGNOSIS — M51.36 DDD (DEGENERATIVE DISC DISEASE), LUMBAR: ICD-10-CM

## 2022-03-16 DIAGNOSIS — R10.9 RIGHT FLANK PAIN: ICD-10-CM

## 2022-03-16 DIAGNOSIS — N20.0 KIDNEY STONES: Primary | ICD-10-CM

## 2022-03-16 DIAGNOSIS — M54.50 ACUTE RIGHT-SIDED LOW BACK PAIN WITHOUT SCIATICA: Primary | ICD-10-CM

## 2022-03-16 PROCEDURE — 3008F PR BODY MASS INDEX (BMI) DOCUMENTED: ICD-10-PCS | Mod: CPTII,S$GLB,, | Performed by: PHYSICIAN ASSISTANT

## 2022-03-16 PROCEDURE — 3077F SYST BP >= 140 MM HG: CPT | Mod: CPTII,S$GLB,, | Performed by: UROLOGY

## 2022-03-16 PROCEDURE — 1159F PR MEDICATION LIST DOCUMENTED IN MEDICAL RECORD: ICD-10-PCS | Mod: CPTII,S$GLB,, | Performed by: UROLOGY

## 2022-03-16 PROCEDURE — 99999 PR PBB SHADOW E&M-EST. PATIENT-LVL III: CPT | Mod: PBBFAC,,, | Performed by: UROLOGY

## 2022-03-16 PROCEDURE — 3080F DIAST BP >= 90 MM HG: CPT | Mod: CPTII,S$GLB,, | Performed by: UROLOGY

## 2022-03-16 PROCEDURE — 1160F PR REVIEW ALL MEDS BY PRESCRIBER/CLIN PHARMACIST DOCUMENTED: ICD-10-PCS | Mod: CPTII,S$GLB,, | Performed by: PHYSICIAN ASSISTANT

## 2022-03-16 PROCEDURE — 1159F PR MEDICATION LIST DOCUMENTED IN MEDICAL RECORD: ICD-10-PCS | Mod: CPTII,S$GLB,, | Performed by: PHYSICIAN ASSISTANT

## 2022-03-16 PROCEDURE — 3008F BODY MASS INDEX DOCD: CPT | Mod: CPTII,S$GLB,, | Performed by: UROLOGY

## 2022-03-16 PROCEDURE — 3008F BODY MASS INDEX DOCD: CPT | Mod: CPTII,S$GLB,, | Performed by: PHYSICIAN ASSISTANT

## 2022-03-16 PROCEDURE — 99999 PR PBB SHADOW E&M-EST. PATIENT-LVL IV: CPT | Mod: PBBFAC,,, | Performed by: PHYSICIAN ASSISTANT

## 2022-03-16 PROCEDURE — 99204 PR OFFICE/OUTPT VISIT, NEW, LEVL IV, 45-59 MIN: ICD-10-PCS | Mod: S$GLB,,, | Performed by: PHYSICIAN ASSISTANT

## 2022-03-16 PROCEDURE — 3008F PR BODY MASS INDEX (BMI) DOCUMENTED: ICD-10-PCS | Mod: CPTII,S$GLB,, | Performed by: UROLOGY

## 2022-03-16 PROCEDURE — 72110 X-RAY EXAM L-2 SPINE 4/>VWS: CPT | Mod: 26,,, | Performed by: RADIOLOGY

## 2022-03-16 PROCEDURE — 1159F MED LIST DOCD IN RCRD: CPT | Mod: CPTII,S$GLB,, | Performed by: UROLOGY

## 2022-03-16 PROCEDURE — 99204 OFFICE O/P NEW MOD 45 MIN: CPT | Mod: S$GLB,,, | Performed by: PHYSICIAN ASSISTANT

## 2022-03-16 PROCEDURE — 99204 PR OFFICE/OUTPT VISIT, NEW, LEVL IV, 45-59 MIN: ICD-10-PCS | Mod: S$GLB,,, | Performed by: UROLOGY

## 2022-03-16 PROCEDURE — 3077F PR MOST RECENT SYSTOLIC BLOOD PRESSURE >= 140 MM HG: ICD-10-PCS | Mod: CPTII,S$GLB,, | Performed by: UROLOGY

## 2022-03-16 PROCEDURE — 99204 OFFICE O/P NEW MOD 45 MIN: CPT | Mod: S$GLB,,, | Performed by: UROLOGY

## 2022-03-16 PROCEDURE — 1160F RVW MEDS BY RX/DR IN RCRD: CPT | Mod: CPTII,S$GLB,, | Performed by: PHYSICIAN ASSISTANT

## 2022-03-16 PROCEDURE — 1160F RVW MEDS BY RX/DR IN RCRD: CPT | Mod: CPTII,S$GLB,, | Performed by: UROLOGY

## 2022-03-16 PROCEDURE — 99999 PR PBB SHADOW E&M-EST. PATIENT-LVL III: ICD-10-PCS | Mod: PBBFAC,,, | Performed by: UROLOGY

## 2022-03-16 PROCEDURE — 1160F PR REVIEW ALL MEDS BY PRESCRIBER/CLIN PHARMACIST DOCUMENTED: ICD-10-PCS | Mod: CPTII,S$GLB,, | Performed by: UROLOGY

## 2022-03-16 PROCEDURE — 99999 PR PBB SHADOW E&M-EST. PATIENT-LVL IV: ICD-10-PCS | Mod: PBBFAC,,, | Performed by: PHYSICIAN ASSISTANT

## 2022-03-16 PROCEDURE — 1159F MED LIST DOCD IN RCRD: CPT | Mod: CPTII,S$GLB,, | Performed by: PHYSICIAN ASSISTANT

## 2022-03-16 PROCEDURE — 72110 XR LUMBAR SPINE AP AND LAT WITH FLEX/EXT: ICD-10-PCS | Mod: 26,,, | Performed by: RADIOLOGY

## 2022-03-16 PROCEDURE — 3080F PR MOST RECENT DIASTOLIC BLOOD PRESSURE >= 90 MM HG: ICD-10-PCS | Mod: CPTII,S$GLB,, | Performed by: UROLOGY

## 2022-03-16 PROCEDURE — 72110 X-RAY EXAM L-2 SPINE 4/>VWS: CPT | Mod: TC

## 2022-03-16 RX ORDER — ALPRAZOLAM 0.5 MG/1
TABLET ORAL
COMMUNITY
Start: 2022-03-09

## 2022-03-16 RX ORDER — PANTOPRAZOLE SODIUM 40 MG/1
40 TABLET, DELAYED RELEASE ORAL DAILY
COMMUNITY
Start: 2022-03-03

## 2022-03-16 RX ORDER — METHYLPREDNISOLONE 4 MG/1
TABLET ORAL
Qty: 1 EACH | Refills: 0 | Status: SHIPPED | OUTPATIENT
Start: 2022-03-16

## 2022-03-16 NOTE — PROGRESS NOTES
DATE: 3/16/2022  PATIENT: Emmanuel Ervin Jr.    Supervising Physician: Monroe Garcia M.D.    CHIEF COMPLAINT: back pain    HISTORY:  Emmanuel Ervin Jr. is a 31 y.o. male  who is a new patient to me is here for initial evaluation of back pain (Back - 4). Pt was working on a car 10 days ago and afterwards he developed the back pain. Pt has an extensive history of kidney stones. He went to the ER and a CT scan was done which showed kidney stones. He was treated with morphine ,IV fluids, NSAIDS, Toradol. Pt went home and found no relief and his pain was still progressively getting worse. Pt went back to the ER and another CT scan was ordered and pt was treated once again. He was referred to see a urologist for possible kidney stones. However, the urologist said his current kidney stones should not be the cause of his right sided low back pain. The pain is located in his right lower thoracic region. The pain in the back is what bothers him most. The pain has been present for 10 days. The patient describes the pain as sharp stabbing. The pain is worse with movement such as walking, flexing, turning and improved by only by heat or laying face down on the bed. There is no associated numbness and tingling. There is no subjective weakness. Prior treatments have included NSAIDS, morphine, Toradol, Flexeril but no steroid injections or surgery..    The patient denies myelopathic symptoms such as handwriting changes or difficulty with buttons/coins/keys. Denies perineal paresthesias, bowel/bladder dysfunction.    PAST MEDICAL/SURGICAL HISTORY:  Past Medical History:   Diagnosis Date    Hypertension     Kidney stones     UTI (urinary tract infection)     diagnosed on 4/16/16     Past Surgical History:   Procedure Laterality Date    EXTRACORPOREAL SHOCK WAVE LITHOTRIPSY  2016    multiple since 2009    SKIN GRAFT Right     s/p traumatic injury to foot at 1 y/o    TOE AMPUTATION Right     due to   accident at 1 y/o       Current Medications:   Current Outpatient Medications:     ibuprofen (ADVIL,MOTRIN) 800 MG tablet, Take 1 tablet (800 mg total) by mouth every 6 (six) hours as needed for Pain., Disp: 20 tablet, Rfl: 0    oxyCODONE-acetaminophen (PERCOCET) 5-325 mg per tablet, Take 1 tablet by mouth every 6 (six) hours as needed for Pain., Disp: 12 tablet, Rfl: 0    pantoprazole (PROTONIX) 40 MG tablet, Take 40 mg by mouth once daily., Disp: , Rfl:     quetiapine (SEROQUEL) 300 MG Tab, Take by mouth., Disp: , Rfl:     ALPRAZolam (XANAX) 0.5 MG tablet, alprazolam 0.5 mg tablet  TAKE 1 TABLET BY MOUTH EVERY DAY AS NEEDED, Disp: , Rfl:     LIDOcaine (LIDODERM) 5 %, Place 1 patch onto the skin once daily. Remove & Discard patch within 12 hours or as directed by MD (Patient not taking: Reported on 3/16/2022), Disp: 30 each, Rfl: 0    methylPREDNISolone (MEDROL DOSEPACK) 4 mg tablet, use as directed, Disp: 1 each, Rfl: 0    Social History:   Social History     Socioeconomic History    Marital status:    Tobacco Use    Smoking status: Never Smoker    Smokeless tobacco: Never Used   Substance and Sexual Activity    Alcohol use: No    Drug use: No    Sexual activity: Yes     Partners: Female       REVIEW OF SYSTEMS:  Constitution: Negative. Negative for chills, fever and night sweats.   Cardiovascular: Negative for chest pain and syncope.   Respiratory: Negative for cough and shortness of breath.   Gastrointestinal: See HPI. Negative for nausea/vomiting. Negative for abdominal pain.  Genitourinary: See HPI. Negative for discoloration or dysuria.  Skin: Negative for dry skin, itching and rash.   Hematologic/Lymphatic: Negative for bleeding problem. Does not bruise/bleed easily.   Musculoskeletal: Negative for falls and muscle weakness.   Neurological: See HPI. No seizures.   Endocrine: Negative for polydipsia, polyphagia and polyuria.   Allergic/Immunologic: Negative for hives and persistent  "infections.    PHYSICAL EXAMINATION:    Ht 5' 11" (1.803 m)   Wt 114.3 kg (251 lb 15.8 oz)   BMI 35.14 kg/m²     General: The patient is a very pleasant 31 y.o. male in no apparent distress, the patient is oriented to person, place and time.   Psych: Normal mood and affect  HEENT: Vision grossly intact, hearing intact to the spoken word.  Lungs: Respirations unlabored.  Gait: Antalgic station and gait.   Skin: Dorsal lumbar skin negative for rashes, lesions, hairy patches and surgical scars.  Range of motion: Lumbar range of motion is acceptable. There is mild right sided lumbar tenderness to palpation.  Spinal Balance: Global saggital and coronal spinal balance acceptable, no significant for scoliosis and kyphosis.  Musculoskeletal: No pain with the range of motion of the bilateral hips. No trochanteric tenderness to palpation.  Vascular: Bilateral lower extremities warm and well perfused, Dorsalis pedis pulses 2+ bilaterally.  Neurological: Normal strength and tone in all major motor groups in the bilateral lower extremities. Normal sensation to light touch in the L2-S1 dermatomes bilaterally.  Deep tendon reflexes symmetric 2+ in the bilateral lower extremities.  Negative Babinski bilaterally.  Straight leg raise negative bilaterally.     IMAGING:   Today I personally reviewed AP, Lat and Flex/Ex upright L-spine films that demonstrate normal disc spacing and alignment. No acute abnormalities.    CT renal study shows 2 non obstructing right renal calculi.  No evidence of infection or other acute osseous abnormalities.     Body mass index is 35.14 kg/m².    No results found for: HGBA1C      ASSESSMENT/PLAN:    Emmanuel was seen today for pain.    Diagnoses and all orders for this visit:    Acute right-sided low back pain without sciatica    Right flank pain  -     Ambulatory referral/consult to Orthopedics    Other orders  -     methylPREDNISolone (MEDROL DOSEPACK) 4 mg tablet; use as directed      The patient is " having primarily back pain without radicular or myelopathic symptoms.  His pain has failed to respond to narcotics and muscle relaxers.  Medrol dose pack sent to the pharmacy.  He will follow up with me in a few days if he does not start moving in the right direction.  We may consider an MRI at that time.

## 2022-03-16 NOTE — LETTER
March 16, 2022                   [id number]  [address]  [facility] [address]  Phone: [phone]   Patient: Emmanuel Ervin Jr.   MR Number: [id number]   YOB: 1990   Date of Visit: 3/16/2022     To whom it may concern,    Emmanuel Ervin was seen in the Orthopedic clinic 3/16/22 for back pain.  He is unable to return to work until 3/19/2022.  If you have any questions or concerns, please contact the office.        Sincerely,        Renu Ness PA-C

## 2022-03-16 NOTE — PROGRESS NOTES
Subjective:      Patient ID: Emmanuel Ervin Jr. is a 31 y.o. male.    Chief Complaint: kidney stones.    Patient is a 31 y.o. male who is new to our clinic (seen > 3 years ago) and was initially referred by their PCP, Dr. Schrader for evaluation of kidney stones.     HPI  Patient reports acute onset of right-sided severe flank pain.  Onset was approximately 2 weeks ago.  Pain occurred after working on a friend's car.  Constant in nature.  Severe.  Nonradiating.  No nausea or vomiting.  No fevers or chills.  Visited the emergency department 2 times in 3 days.  Underwent multiple CT scan showing nonobstructing renal stones.  Has a prior history of kidney stones requiring pcnl.  Has had ESWL remotely.   CT scan was independently reviewed today and reveals 5x7mm right lower pole renal stone, , SSD 11cm.       No gross hematuria.  Pain worse with walking.  toradol does not help.    Review of Systems   All other systems reviewed and negative except pertinent positives noted in HPI.    Objective:     Physical Exam  Constitutional:       General: He is not in acute distress.     Appearance: He is well-developed.   HENT:      Head: Normocephalic and atraumatic.   Eyes:      General: No scleral icterus.  Neck:      Trachea: No tracheal deviation.   Pulmonary:      Effort: Pulmonary effort is normal. No respiratory distress.   Neurological:      Mental Status: He is alert and oriented to person, place, and time.   Psychiatric:         Behavior: Behavior normal.         Thought Content: Thought content normal.         Judgment: Judgment normal.       Assessment:     1. Kidney stones      Plan:     1. Kidney stone:  -General risk factors for kidney stones and the conservative measures to prevent kidney stones in the future were discussed with the patient in detail.  The patient was encouraged to drink 2-3 liters of water a day, limit iced tea and suki as well as foods high in oxalate.  They were cautioned to try to limit  salt and red meat intake.  We also discussed adding citrate to the diet with the addition of jenaro or lemon juice to their water or alternatively with crystal light.   -Imaging review: 5x7mm lower pole right renal stones, no hydronephrosis.  -discussed ESWL vs ureteroscopy vs observation.  Also discussed that his pain is not likely related to the stone given the acute onset and this stone is not causing obstruction and has likely been there for months/years.

## 2022-03-16 NOTE — PROGRESS NOTES
DATE: 3/16/2022  PATIENT: Emmanuel Ervin Jr.    Supervising Physician: Monroe Garcia M.D.    CHIEF COMPLAINT: Back pain    HISTORY:  Emmanuel Ervin Jr. is a 31 y.o. male  who is a new patient to me is here for initial evaluation of back pain (Back - 4). Pt was working on a car 10 days ago and afterwards he developed the back pain. Pt has an extensive history of kidney stones. He went to the ER and a CT scan was done which showed kidney stones. He was treated with morphine ,IV fluids, NSAIDS, Toradol. Pt went home and found no relief and his pain was still progressively getting worse. Pt went back to the ER and another CT scan was ordered and pt was treated once again. He was referred to see a urologist for possible kidney stones. However, the urologist said his current kidney stones should not be the cause of his right sided low back pain. The pain is located in his right lower thoracic region. The pain in the back is what bothers him most. The pain has been present for 10 days. The patient describes the pain as sharp stabbing. The pain is worse with movement such as walking, flexing, turning and improved by only by heat or laying face down on the bed. There is no associated numbness and tingling. There is no subjective weakness. Prior treatments have included NSAIDS, morphine, Toradol, Flexeril but no steroid injections or surgery..    The patient denies myelopathic symptoms such as handwriting changes or difficulty with buttons/coins/keys. Denies perineal paresthesias, bowel/bladder dysfunction.    PAST MEDICAL/SURGICAL HISTORY:  Past Medical History:   Diagnosis Date    Hypertension     Kidney stones     UTI (urinary tract infection)     diagnosed on 4/16/16     Past Surgical History:   Procedure Laterality Date    EXTRACORPOREAL SHOCK WAVE LITHOTRIPSY  2016    multiple since 2009    SKIN GRAFT Right     s/p traumatic injury to foot at 1 y/o    TOE AMPUTATION Right     due to   accident at 1 y/o       Medications:   Current Outpatient Medications on File Prior to Visit   Medication Sig Dispense Refill    ibuprofen (ADVIL,MOTRIN) 800 MG tablet Take 1 tablet (800 mg total) by mouth every 6 (six) hours as needed for Pain. 20 tablet 0    oxyCODONE-acetaminophen (PERCOCET) 5-325 mg per tablet Take 1 tablet by mouth every 6 (six) hours as needed for Pain. 12 tablet 0    pantoprazole (PROTONIX) 40 MG tablet Take 40 mg by mouth once daily.      quetiapine (SEROQUEL) 300 MG Tab Take by mouth.      ALPRAZolam (XANAX) 0.5 MG tablet alprazolam 0.5 mg tablet   TAKE 1 TABLET BY MOUTH EVERY DAY AS NEEDED      LIDOcaine (LIDODERM) 5 % Place 1 patch onto the skin once daily. Remove & Discard patch within 12 hours or as directed by MD (Patient not taking: Reported on 3/16/2022) 30 each 0    [DISCONTINUED] albuterol (PROVENTIL/VENTOLIN HFA) 90 mcg/actuation inhaler Inhale 1-2 puffs into the lungs every 6 (six) hours as needed for Shortness of Breath. Rescue 6.7 g 0    [DISCONTINUED] alprazolam (XANAX) 1 MG tablet Take 1 tablet by mouth 2 (two) times daily.      [DISCONTINUED] atenolol (TENORMIN) 50 MG tablet Take 50 mg by mouth once daily.      [DISCONTINUED] azithromycin (Z-SHERRILL) 250 MG tablet Take 1 tablet (250 mg total) by mouth once daily. Take first 2 tablets together, then 1 every day until finished. 6 tablet 0    [DISCONTINUED] cyclobenzaprine (FLEXERIL) 10 MG tablet Take 1 tablet (10 mg total) by mouth 3 (three) times daily as needed for Muscle spasms. 10 tablet 0    [DISCONTINUED] HYDROcodone-acetaminophen (NORCO) 5-325 mg per tablet Take 1 tablet by mouth every 4 (four) hours as needed for Pain. 15 tablet 0    [DISCONTINUED] ketorolac (TORADOL) 10 mg tablet Take 1 tablet (10 mg total) by mouth every 6 (six) hours as needed for Pain. 15 tablet 0    [DISCONTINUED] oxyCODONE-acetaminophen (PERCOCET) 5-325 mg per tablet Take 1 tablet by mouth every 4 (four) hours as needed for Pain. 8  "tablet 0    [DISCONTINUED] pulse oximeter (PULSE OXIMETER) device by Apply Externally route 2 (two) times a day. Use twice daily at 8 AM and 3 PM and [facility]. 1 each 0    [DISCONTINUED] ranitidine (ZANTAC) 300 MG tablet       [DISCONTINUED] tamsulosin (FLOMAX) 0.4 mg Cap Take 1 capsule (0.4 mg total) by mouth once daily. 30 capsule 0    [DISCONTINUED] traMADoL (ULTRAM) 50 mg tablet Take 1 tablet (50 mg total) by mouth every 6 (six) hours as needed for Pain. 10 tablet 0     No current facility-administered medications on file prior to visit.       Social History:   Social History     Socioeconomic History    Marital status:    Tobacco Use    Smoking status: Never Smoker    Smokeless tobacco: Never Used   Substance and Sexual Activity    Alcohol use: No    Drug use: No    Sexual activity: Yes     Partners: Female       REVIEW OF SYSTEMS:  Constitution: Negative. Negative for chills, fever and night sweats.   Cardiovascular: Negative for chest pain and syncope.   Respiratory: Negative for cough and shortness of breath.   Gastrointestinal: See HPI. Negative for nausea/vomiting. Negative for abdominal pain.  Genitourinary: See HPI. Negative for discoloration or dysuria.  Skin: Negative for dry skin, itching and rash.   Hematologic/Lymphatic: Negative for bleeding problem. Does not bruise/bleed easily.   Musculoskeletal: Negative for falls and muscle weakness.   Neurological: See HPI. No seizures.   Endocrine: Negative for polydipsia, polyphagia and polyuria.   Allergic/Immunologic: Negative for hives and persistent infections.     EXAM:  Ht 5' 11" (1.803 m)   Wt 114.3 kg (251 lb 15.8 oz)   BMI 35.14 kg/m²     General: The patient is a pleasant 31 y.o. male in no apparent distress, the patient is oriented to person, place and time.  Psych: Normal mood and affect  HEENT: Vision grossly intact, hearing intact to the spoken word.  Lungs: Respirations unlabored.  Gait: Normal " station and gait, no difficulty with toe or heel walk.   Skin: Dorsal lumbar skin negative for rashes, lesions, hairy patches and surgical scars. There is lower thoracic tenderness to palpation.  Range of motion: Lumbar range of motion is acceptable.  Spinal Balance: Global saggital and coronal spinal balance acceptable, not significant for scoliosis and kyphosis.  Musculoskeletal: No pain with the range of motion of the bilateral hips. No trochanteric tenderness to palpation. Tenderness in right lower thoracic region. Pain with flexion and extension of body  Vascular: Bilateral lower extremities warm and well perfused, dorsalis pedis pulses 2+ bilaterally.  Neurological: Normal strength and tone in all major motor groups in the bilateral lower extremities. Normal sensation to light touch in the L2-S1 dermatomes bilaterally.  Deep tendon reflexes symmetric  in the bilateral lower extremities.  Negative Babinski bilaterally. Straight leg raise negative bilaterally.    IMAGING:      Today I personally reviewed AP, Lat and Flex/Ex  upright L-spine films that demonstrate       Body mass index is 35.14 kg/m².    No results found for: HGBA1C        ASSESSMENT/PLAN:    Emmanuel was seen today for pain.    Diagnoses and all orders for this visit:    Right flank pain  -     Ambulatory referral/consult to Orthopedics    Other orders  -     methylPREDNISolone (MEDROL DOSEPACK) 4 mg tablet; use as directed

## 2022-03-21 ENCOUNTER — PATIENT MESSAGE (OUTPATIENT)
Dept: ORTHOPEDICS | Facility: CLINIC | Age: 32
End: 2022-03-21
Payer: COMMERCIAL

## 2022-03-21 DIAGNOSIS — M54.50 ACUTE RIGHT-SIDED LOW BACK PAIN WITHOUT SCIATICA: Primary | ICD-10-CM

## 2022-04-26 ENCOUNTER — TELEPHONE (OUTPATIENT)
Dept: UROLOGY | Facility: CLINIC | Age: 32
End: 2022-04-26
Payer: COMMERCIAL

## 2022-04-26 DIAGNOSIS — N20.0 KIDNEY STONES: Primary | ICD-10-CM

## 2022-05-19 ENCOUNTER — PATIENT MESSAGE (OUTPATIENT)
Dept: SURGERY | Facility: HOSPITAL | Age: 32
End: 2022-05-19
Payer: COMMERCIAL

## 2022-05-31 ENCOUNTER — NURSE TRIAGE (OUTPATIENT)
Dept: ADMINISTRATIVE | Facility: CLINIC | Age: 32
End: 2022-05-31
Payer: COMMERCIAL

## 2022-05-31 NOTE — TELEPHONE ENCOUNTER
Pt's wife called and pt has B/P 180/110 and not symptomatic. Pt has rx but not filled till the am and his care advice to see MD in 24 hours. Pt will get Rx filled first thing in the am and if any HA, Blurred vision or weakness to go to the ED in the meantime. Unable to route to PCP   Reason for Disposition   Systolic BP  >= 180 OR Diastolic >= 110    Additional Information   Negative: Difficult to awaken or acting confused (e.g., disoriented, slurred speech)   Negative: SEVERE difficulty breathing (e.g., struggling for each breath, speaks in single words)   Negative: [1] Weakness of the face, arm or leg on one side of the body AND [2] new-onset   Negative: [1] Numbness (i.e., loss of sensation) of the face, arm or leg on one side of the body AND [2] new-onset   Negative: [1] Chest pain lasts > 5 minutes AND [2] history of heart disease  (i.e., heart attack, bypass surgery, angina, angioplasty, CHF)   Negative: [1] Chest pain AND [2] took nitrogylcerin AND [3] pain was not relieved   Negative: Sounds like a life-threatening emergency to the triager   Negative: [1] Systolic BP  >= 160 OR Diastolic >= 100 AND [2] cardiac or neurologic symptoms (e.g., chest pain, difficulty breathing, unsteady gait, blurred vision)   Negative: [1] Pregnant 20 or more weeks (or postpartum < 6 weeks) AND [2] new hand or face swelling   Negative: [1] Pregnant 20 or more weeks AND [2] Systolic BP  >= 140 OR Diastolic >= 90   Negative: [1] Systolic BP  >= 200 OR Diastolic >= 120  AND [2] having NO cardiac or neurologic symptoms   Negative: [1] Postpartum < 6 weeks AND [2] Systolic BP  >= 140 OR Diastolic >= 90   Negative: [1] Systolic BP  >= 180 OR Diastolic >= 110 AND [2] missed most recent dose of blood pressure medication    Protocols used: [facility]

## 2022-10-09 NOTE — TELEPHONE ENCOUNTER
HOSPITALIST DAILY PROGRESS NOTE    Patient: Melanie Figueroa Attending: Alvin Mobley MD   : 1950 Date: 10/9/2022 11:12 AM   AGE: 72 year old Current Hospital Day: Hospital Day: 7   SEX:  female    Brief HPI: 72 year old female with h/o breast cancer s/p treatment,? MDS with thrombocytopenia,leukopenia, h/o of MRSA, open wounds(on the left upper back and right axial area) came to the hospital due to abnormal labs,  and daughter at bedside. On  pt PLC was 2 and required 2 U platelet transfusion outside the facility. Also patient on and off had been of [facility] and while she was on it doing better. And they mentioned that they have been in [place] before all this started. Of note patient was also treated for lyme disease in July. Treatment was initiated in [place]  with cefepime and vancomycin and she was transferred from [facility] to [facility].  Hospital stay patient required platelet transfusion 1 unit 10/7 and unit on 10/ 9.      Subjective  Patient was seen and examined today.  is at bedside.  Pt is eating breakfast. 1 Unit of platelet was transfused this a.m. and was wondering when Neupogen can be administered again since white blood cell count is low. pt Remains on 2 L of oxygen denies any complaints at this moment.  Patient was able to participate in physical therapy today per .    No event overnight    Per nurse hematology ordered 1U plc and offered to start treatment for MDS.  ROS: 12 Point of review of system discussed with the patient in detail, pertinent positive findings are documented above.  Objective:      Vitals:   Vitals:    10/09/22 1054   BP: 130/65   Pulse: 91   Resp: 16   Temp: 98.8 °F (37.1 °C)         Physical Exam:    CONSTITUTIONAL: No acute distress, pt is laying calmly in bed, obese  HEENT: Normocephalic,  PERRLA EOMI, moist oral mucosa, neck supple  CHEST:  No chest wall tenderness to palpitation.  RESPIRATORY: Lungs clear to auscultation  Stent removal set up for 4/12/17 per there request   bilaterally with no wheezes, rhonchi or rails.  CARDIOVASCULAR:   Regular rate and rhythm with normal S1, S2 and no murmur.  GASTROINTESTINAL: Soft, nontender,+ bowel sounds x4 with no masses, guarding or rebound tenderness .  MUSCULOSKELETAL: no joint effusions; no asymmetry, tenderness movement of bilateral lower extremities against gravity appreciated  EXTREMITIES: Pulses intact, no edema no rash.  NEURO: Cranial nerves II - XII are grossly intact without any definite acute deficits appreciated, motor 5/5 upper extremity/lower extremity b/l, sensation intact, AAO x3.  SKIN: Skin turgor is within normal limits, no rash or edema evident.  PSYCH: Mood and affect is appropriate, patient is alert and oriented x3 with a linear thought process    Laboratory/Culture/Imaging Results:     Recent Labs   Lab 10/09/22  0601 10/08/22  0607 10/07/22  0623 10/06/22  1059 10/06/22  0640 10/05/22  0614 10/04/22  0620   WBC 1.1* 1.9* 3.1*  --  4.9   < > 1.0*   HCT 26.6* 28.5* 29.0*  --  30.1*   < > 27.4*   HGB 8.4* 8.9* 9.1*  --  10.2*   < > 8.5*   PLT 7* 12* 6*  --  13*   < > 53*   INR  --  1.0  --   --  1.0  --   --    SODIUM 138 138 139   < > 136   < > 139   POTASSIUM 4.2 4.0 4.0   < > 4.1   < > 3.8   CHLORIDE 104 104 105   < > 105   < > 106   CO2 30 31 30   < > 29   < > 27   GLUCOSE 107* 107* 106*   < > 103*   < > 99   BUN 17 16 14   < > 12   < > 11   CREATININE 0.51  0.51 0.50* 0.50*   < > 0.56   < > 0.57   RESR  --   --   --   --   --   --  45*    < > = values in this interval not displayed.       US LIVER GALLBLADDER PANCREAS   Final Result      1.   Unremarkable gallbladder without cholelithiasis.   2.   Findings consistent with moderate to severe fibrofatty changes in the   liver. Evaluation for intrahepatic masses is limited. If clinically   indicated, CT or MR could be obtained for further evaluation.    3.    Stable mild hepatomegaly.      Electronically Signed by: CATHRYN MEJÍA MD    Signed on: 10/6/2022 10:28 AM           US LIVER    (Results Pending)   US SPLEEN    (Results Pending)          Assessment and Plan:  Pancytopenia  Myelodysplastic sydrome  Severe thrombocytopenia  S/p transfusion  S/p bone marrow biopsy  Fatty liver  Multiple cutaneous lesions on left upper back and right axillary area  Transaminitis  Obesity MI 39           Plan:  Monitor pt vs and signs of infection.  Hematology is on board, appreciate their recommendations. Pt did respond well to 1 Neupogen shot.  ID is on board. Patient remains on cefepime and doxycycline. Ehrlichiosis and babesiosis pending.   Wound care is on board.  Consulted GI for elevated liver enzyme, appreciate their recommendations.  Autoimmune panel still pending. Per GI EBV + and CMV was ordered pending.  Recheck am labs. Monitor signs of bleeding.     Code status: Full  Diet: regular  DVT ppx: Only SCD's. No pharmacological DVT prophylaxis due to pancytopenia/severe thrombocytopenia.  Antibiotics: Cefepime and doxycycline  Consults: hematology/ID/Wound care/GI  Disposition: Continue inpatient care                       Dietary Orders (From admission, onward)             Start     Ordered    10/06/22 0841  Regular, Bacteria Low (Neutropenic) Diet  DIET EFFECTIVE NOW        Question Answer Comment   Diet Modifiers Regular    Diet Modifiers Bacteria Low (Neutropenic)        10/06/22 0841    10/05/22 1220  Daily w Breakfast; Ensure Clear Generic/Clear Liquid Supplement Oral Nutrition Supplement  As Directed        Question Answer Comment   Frequency Daily w Breakfast    Oral Supplement Ensure Clear Generic/Clear Liquid Supplement        10/05/22 1220    10/05/22 1220  Daily w Dinner; Magic Cup Dessert/High Protein Pudding, Vanilla Oral Nutrition Supplement  As Directed        Question Answer Comment   Frequency Daily w Dinner    Oral Supplement Magic Cup Dessert/High Protein Pudding, Vanilla        10/05/22 1220                 Greater than 35 minutes spent on patient care   Time  spent reviewing data labs and imaging, direct patient care, collaboration with care team     This note was created with the help of Dragon voice recognition. Errors in content may be related to improper recognition by the system; efforts to review and correct have been done but errors may still exist.    Alvin Mobley MD  [facility]

## 2023-12-17 ENCOUNTER — HOSPITAL ENCOUNTER (EMERGENCY)
Facility: HOSPITAL | Age: 33
Discharge: HOME | End: 2023-12-17
Attending: SURGERY
Payer: COMMERCIAL

## 2023-12-17 VITALS
DIASTOLIC BLOOD PRESSURE: 79 MMHG | TEMPERATURE: 99 F | BODY MASS INDEX: 34.62 KG/M2 | SYSTOLIC BLOOD PRESSURE: 130 MMHG | OXYGEN SATURATION: 96 % | WEIGHT: 248.25 LBS | RESPIRATION RATE: 18 BRPM | HEART RATE: 109 BPM

## 2023-12-17 DIAGNOSIS — J10.1 INFLUENZA B: Primary | ICD-10-CM

## 2023-12-17 LAB
GROUP A STREP, MOLECULAR: NEGATIVE
INFLUENZA A, MOLECULAR: NEGATIVE
INFLUENZA B, MOLECULAR: POSITIVE
SARS-COV-2 RDRP RESP QL NAA+PROBE: NEGATIVE
SPECIMEN SOURCE: ABNORMAL

## 2023-12-17 PROCEDURE — U0002 COVID-19 LAB TEST NON-CDC: HCPCS | Performed by: NURSE PRACTITIONER

## 2023-12-17 PROCEDURE — 87502 INFLUENZA DNA AMP PROBE: CPT | Performed by: NURSE PRACTITIONER

## 2023-12-17 PROCEDURE — 87651 STREP A DNA AMP PROBE: CPT | Performed by: NURSE PRACTITIONER

## 2023-12-17 PROCEDURE — 99283 EMERGENCY DEPT VISIT LOW MDM: CPT

## 2023-12-17 RX ORDER — OSELTAMIVIR PHOSPHATE 75 MG/1
75 CAPSULE ORAL 2 TIMES DAILY
Qty: 10 CAPSULE | Refills: 0 | Status: SHIPPED | OUTPATIENT
Start: 2023-12-17 | End: 2023-12-22

## 2023-12-17 NOTE — ED PROVIDER NOTES
Encounter Date: 12/17/2023       History     Chief Complaint   Patient presents with    General Illness     Patient to ER CC of fever, light headed, body aches and coughing, reports his wife and son have the FLU     Emmanuel Ervin Jr. is a 33 y.o. male with PMH of hypertension, kidney stones, UTI who presents to the ED for evaluation of flu-like symptoms.  Patient presents with a 2 day history of nasal congestion, sore throat, cough, and fever.  He reports T-max of 101° F at home today.  He denies associated chest pain or shortness a breath.  Denies vomiting or diarrhea.  His wife and his child currently have influenza.    The history is provided by the patient.     Review of patient's allergies indicates:  No Known Allergies  Past Medical History:   Diagnosis Date    Hypertension     Kidney stones     UTI (urinary tract infection)     diagnosed on 4/16/16     Past Surgical History:   Procedure Laterality Date    EXTRACORPOREAL SHOCK WAVE LITHOTRIPSY  2016    multiple since 2009    SKIN GRAFT Right     s/p traumatic injury to foot at 1 y/o    TOE AMPUTATION Right     due to  accident at 1 y/o     Family History   Problem Relation Age of Onset    Diabetes Maternal Aunt     Heart disease Maternal Aunt     Diabetes Maternal Grandmother     Hypertension Maternal Grandmother     Heart disease Maternal Grandmother     Prostate cancer Neg Hx     Kidney disease Neg Hx      Social History     Tobacco Use    Smoking status: Never    Smokeless tobacco: Never   Substance Use Topics    Alcohol use: No    Drug use: No     Review of Systems   Constitutional:  Positive for chills and fever. Negative for appetite change and fatigue.   HENT:  Positive for congestion. Negative for ear discharge, ear pain, facial swelling, postnasal drip, rhinorrhea, sinus pressure, sinus pain and sore throat.    Respiratory:  Positive for cough. Negative for shortness of breath and wheezing.    Cardiovascular:  Negative for chest pain.    Gastrointestinal:  Negative for abdominal pain, nausea and vomiting.   Genitourinary:  Negative for dysuria, frequency and urgency.   Musculoskeletal:  Positive for myalgias. Negative for back pain.   Skin: Negative.  Negative for rash.   Neurological:  Negative for dizziness, syncope, weakness and light-headedness.   Hematological:  Does not bruise/bleed easily.       Physical Exam     Initial Vitals [12/17/23 1137]   BP Pulse Resp Temp SpO2   133/83 110 18 98.7 °F (37.1 °C) 96 %      MAP       --         Physical Exam    Nursing note and vitals reviewed.  Constitutional: He appears well-developed and well-nourished.   HENT:   Head: Normocephalic and atraumatic.   Nose: Mucosal edema and rhinorrhea present.   Eyes: Conjunctivae and EOM are normal. Pupils are equal, round, and reactive to light.   Neck: Neck supple.   Cardiovascular:  Normal rate, regular rhythm, normal heart sounds and intact distal pulses.           Pulmonary/Chest: Breath sounds normal.   Abdominal: Abdomen is soft. Bowel sounds are normal.   Musculoskeletal:         General: Normal range of motion.      Cervical back: Neck supple.     Neurological: He is alert and oriented to person, place, and time. He has normal strength.   Skin: Skin is warm and dry.   Psychiatric: He has a normal mood and affect. His behavior is normal. Judgment and thought content normal.         ED Course   Procedures  Labs Reviewed   INFLUENZA A & B BY MOLECULAR - Abnormal; Notable for the following components:       Result Value    Influenza B, Molecular Positive (*)     All other components within normal limits   GROUP A STREP, MOLECULAR   SARS-COV-2 RNA AMPLIFICATION, QUAL          Imaging Results    None          Medications - No data to display  Medical Decision Making  Evaluation with 33-year-old male with flu-like symptoms with exposure by his wife.  He presents with stable vital signs.  Physical exam is unremarkable.  Differential diagnosis includes influenza,  COVID, strep, viral URI    Problems Addressed:  Influenza B: acute illness or injury    Amount and/or Complexity of Data Reviewed  Labs: ordered. Decision-making details documented in ED Course.     Details: Influenza B positive    Risk  Prescription drug management.  Risk Details: Stable for DC home.  Will discharge with Tamiflu and symptomatic treatment for influenza. Patient/caregiver voices understanding and feels comfortable with discharge plan.      The patient acknowledges that close follow up with medical provider is required. Instructed to follow up with PCP within 2 days. Patient was given specific return precautions. The patient agrees to comply with all instruction and directions given in the ER.                                        Clinical Impression:  Final diagnoses:  [J10.1] Influenza B (Primary)          ED Disposition Condition    Discharge Stable          ED Prescriptions       Medication Sig Dispense Start Date End Date Auth. Provider    oseltamivir (TAMIFLU) 75 MG capsule Take 1 capsule (75 mg total) by mouth 2 (two) times daily. for 5 days 10 capsule 12/17/2023 12/22/2023 Sudha Humphries NP          Follow-up Information       Follow up With Specialties Details Why Contact Info    Talha Cook MD Family Medicine Schedule an appointment as soon as possible for a visit in 2 days  [id number]  [facility]  [place] [address]  [phone]               Sudha Humphries NP  12/17/23 1219

## 2023-12-17 NOTE — Clinical Note
"Emmanuel Trotter" Arcadio was seen and treated in our emergency department on 12/17/2023.  He may return to work on 12/23/2023.       If you have any questions or concerns, please don't hesitate to call.       RN    "

## 2024-10-09 ENCOUNTER — HOSPITAL ENCOUNTER (OUTPATIENT)
Facility: HOSPITAL | Age: 34
Discharge: HOME | End: 2024-10-11
Attending: FAMILY MEDICINE | Admitting: FAMILY MEDICINE
Payer: COMMERCIAL

## 2024-10-09 ENCOUNTER — NURSE TRIAGE (OUTPATIENT)
Dept: ADMINISTRATIVE | Facility: CLINIC | Age: 34
End: 2024-10-09
Payer: COMMERCIAL

## 2024-10-09 DIAGNOSIS — N17.9 AKI (ACUTE KIDNEY INJURY): Primary | ICD-10-CM

## 2024-10-09 DIAGNOSIS — N20.0 RENAL CALCULUS OR STONE: ICD-10-CM

## 2024-10-09 DIAGNOSIS — N20.0 LEFT NEPHROLITHIASIS: ICD-10-CM

## 2024-10-09 LAB
ALBUMIN SERPL BCP-MCNC: 4 G/DL (ref 3.5–5.2)
ALP SERPL-CCNC: 85 U/L (ref 55–135)
ALT SERPL W/O P-5'-P-CCNC: 53 U/L (ref 10–44)
ANION GAP SERPL CALC-SCNC: 12 MMOL/L (ref 8–16)
ANION GAP SERPL CALC-SCNC: 14 MMOL/L (ref 8–16)
AST SERPL-CCNC: 44 U/L (ref 10–40)
BACTERIA #/AREA URNS HPF: ABNORMAL /HPF
BASOPHILS # BLD AUTO: 0.08 K/UL (ref 0–0.2)
BASOPHILS NFR BLD: 0.5 % (ref 0–1.9)
BILIRUB SERPL-MCNC: 0.8 MG/DL (ref 0.1–1)
BILIRUB UR QL STRIP: NEGATIVE
BUN SERPL-MCNC: 19 MG/DL (ref 6–20)
BUN SERPL-MCNC: 19 MG/DL (ref 6–20)
CALCIUM SERPL-MCNC: 8.6 MG/DL (ref 8.7–10.5)
CALCIUM SERPL-MCNC: 9.4 MG/DL (ref 8.7–10.5)
CAOX CRY URNS QL MICRO: ABNORMAL
CHLORIDE SERPL-SCNC: 106 MMOL/L (ref 95–110)
CHLORIDE SERPL-SCNC: 110 MMOL/L (ref 95–110)
CLARITY UR: ABNORMAL
CO2 SERPL-SCNC: 20 MMOL/L (ref 23–29)
CO2 SERPL-SCNC: 21 MMOL/L (ref 23–29)
COLOR UR: YELLOW
CREAT SERPL-MCNC: 1.8 MG/DL (ref 0.5–1.4)
CREAT SERPL-MCNC: 1.9 MG/DL (ref 0.5–1.4)
DIFFERENTIAL METHOD BLD: ABNORMAL
EOSINOPHIL # BLD AUTO: 0.2 K/UL (ref 0–0.5)
EOSINOPHIL NFR BLD: 1.1 % (ref 0–8)
ERYTHROCYTE [DISTWIDTH] IN BLOOD BY AUTOMATED COUNT: 13.7 % (ref 11.5–14.5)
EST. GFR  (NO RACE VARIABLE): 46 ML/MIN/1.73 M^2
EST. GFR  (NO RACE VARIABLE): 49 ML/MIN/1.73 M^2
GLUCOSE SERPL-MCNC: 88 MG/DL (ref 70–110)
GLUCOSE SERPL-MCNC: 97 MG/DL (ref 70–110)
GLUCOSE UR QL STRIP: NEGATIVE
HCT VFR BLD AUTO: 41.1 % (ref 40–54)
HGB BLD-MCNC: 14 G/DL (ref 14–18)
HGB UR QL STRIP: ABNORMAL
IMM GRANULOCYTES # BLD AUTO: 0.06 K/UL (ref 0–0.04)
IMM GRANULOCYTES NFR BLD AUTO: 0.4 % (ref 0–0.5)
KETONES UR QL STRIP: NEGATIVE
LEUKOCYTE ESTERASE UR QL STRIP: ABNORMAL
LIPASE SERPL-CCNC: 30 U/L (ref 4–60)
LYMPHOCYTES # BLD AUTO: 3.8 K/UL (ref 1–4.8)
LYMPHOCYTES NFR BLD: 25.2 % (ref 18–48)
MCH RBC QN AUTO: 28.4 PG (ref 27–31)
MCHC RBC AUTO-ENTMCNC: 34.1 G/DL (ref 32–36)
MCV RBC AUTO: 83 FL (ref 82–98)
MICROSCOPIC COMMENT: ABNORMAL
MONOCYTES # BLD AUTO: 1.7 K/UL (ref 0.3–1)
MONOCYTES NFR BLD: 11.1 % (ref 4–15)
NEUTROPHILS # BLD AUTO: 9.2 K/UL (ref 1.8–7.7)
NEUTROPHILS NFR BLD: 61.7 % (ref 38–73)
NITRITE UR QL STRIP: NEGATIVE
NRBC BLD-RTO: 0 /100 WBC
PH UR STRIP: 6 [PH] (ref 5–8)
PLATELET # BLD AUTO: 212 K/UL (ref 150–450)
PMV BLD AUTO: 11.8 FL (ref 9.2–12.9)
POTASSIUM SERPL-SCNC: 3.7 MMOL/L (ref 3.5–5.1)
POTASSIUM SERPL-SCNC: 3.8 MMOL/L (ref 3.5–5.1)
PROT SERPL-MCNC: 7 G/DL (ref 6–8.4)
PROT UR QL STRIP: NEGATIVE
RBC # BLD AUTO: 4.93 M/UL (ref 4.6–6.2)
RBC #/AREA URNS HPF: >100 /HPF (ref 0–4)
SODIUM SERPL-SCNC: 141 MMOL/L (ref 136–145)
SODIUM SERPL-SCNC: 142 MMOL/L (ref 136–145)
SP GR UR STRIP: 1.01 (ref 1–1.03)
SQUAMOUS #/AREA URNS HPF: 2 /HPF
URN SPEC COLLECT METH UR: ABNORMAL
UROBILINOGEN UR STRIP-ACNC: NEGATIVE EU/DL
WBC # BLD AUTO: 14.9 K/UL (ref 3.9–12.7)
WBC #/AREA URNS HPF: 5 /HPF (ref 0–5)

## 2024-10-09 PROCEDURE — 25000003 PHARM REV CODE 250: Performed by: FAMILY MEDICINE

## 2024-10-09 PROCEDURE — 96375 TX/PRO/DX INJ NEW DRUG ADDON: CPT

## 2024-10-09 PROCEDURE — 80048 BASIC METABOLIC PNL TOTAL CA: CPT | Mod: XB | Performed by: FAMILY MEDICINE

## 2024-10-09 PROCEDURE — 99285 EMERGENCY DEPT VISIT HI MDM: CPT | Mod: 25

## 2024-10-09 PROCEDURE — 96374 THER/PROPH/DIAG INJ IV PUSH: CPT

## 2024-10-09 PROCEDURE — 83690 ASSAY OF LIPASE: CPT | Performed by: FAMILY MEDICINE

## 2024-10-09 PROCEDURE — 80053 COMPREHEN METABOLIC PANEL: CPT | Performed by: FAMILY MEDICINE

## 2024-10-09 PROCEDURE — 63600175 PHARM REV CODE 636 W HCPCS: Performed by: FAMILY MEDICINE

## 2024-10-09 PROCEDURE — 96361 HYDRATE IV INFUSION ADD-ON: CPT

## 2024-10-09 PROCEDURE — 81000 URINALYSIS NONAUTO W/SCOPE: CPT | Performed by: FAMILY MEDICINE

## 2024-10-09 PROCEDURE — 96376 TX/PRO/DX INJ SAME DRUG ADON: CPT

## 2024-10-09 PROCEDURE — 85025 COMPLETE CBC W/AUTO DIFF WBC: CPT | Performed by: FAMILY MEDICINE

## 2024-10-09 RX ORDER — KETOROLAC TROMETHAMINE 30 MG/ML
15 INJECTION, SOLUTION INTRAMUSCULAR; INTRAVENOUS
Status: COMPLETED | OUTPATIENT
Start: 2024-10-09 | End: 2024-10-09

## 2024-10-09 RX ORDER — MORPHINE SULFATE 2 MG/ML
2 INJECTION, SOLUTION INTRAMUSCULAR; INTRAVENOUS
Status: COMPLETED | OUTPATIENT
Start: 2024-10-09 | End: 2024-10-09

## 2024-10-09 RX ORDER — SODIUM CHLORIDE 9 MG/ML
1000 INJECTION, SOLUTION INTRAVENOUS
Status: COMPLETED | OUTPATIENT
Start: 2024-10-09 | End: 2024-10-09

## 2024-10-09 RX ORDER — MORPHINE SULFATE 2 MG/ML
4 INJECTION, SOLUTION INTRAMUSCULAR; INTRAVENOUS
Status: COMPLETED | OUTPATIENT
Start: 2024-10-09 | End: 2024-10-09

## 2024-10-09 RX ORDER — ONDANSETRON HYDROCHLORIDE 2 MG/ML
4 INJECTION, SOLUTION INTRAVENOUS
Status: COMPLETED | OUTPATIENT
Start: 2024-10-09 | End: 2024-10-09

## 2024-10-09 RX ADMIN — ONDANSETRON 4 MG: 2 INJECTION INTRAMUSCULAR; INTRAVENOUS at 09:10

## 2024-10-09 RX ADMIN — SODIUM CHLORIDE 1000 ML: 9 INJECTION, SOLUTION INTRAVENOUS at 09:10

## 2024-10-09 RX ADMIN — KETOROLAC TROMETHAMINE 15 MG: 30 INJECTION, SOLUTION INTRAMUSCULAR at 09:10

## 2024-10-09 RX ADMIN — MORPHINE SULFATE 4 MG: 2 INJECTION, SOLUTION INTRAMUSCULAR; INTRAVENOUS at 09:10

## 2024-10-09 RX ADMIN — MORPHINE SULFATE 2 MG: 2 INJECTION, SOLUTION INTRAMUSCULAR; INTRAVENOUS at 10:10

## 2024-10-09 RX ADMIN — SODIUM CHLORIDE 1000 ML: 9 INJECTION, SOLUTION INTRAVENOUS at 10:10

## 2024-10-09 NOTE — LETTER
October 11, 2024    Emmanuel Ervin Jr.  [address]  [facility] [address] [address] [facility] [id number]  Phone: [phone]  Fax: [phone]   October 11, 2024     Patient: Emmanuel Ervin Jr.       Date of Visit: 10/9/2024       To Whom it May Concern:    Emmanuel Ervin was seen virtually on 10/9/2024. He may return to work on 10/16/24 .    Please excuse him from work missed.    If you have any questions or concerns, please don't hesitate to call.    Sincerely,         CINDA Mills

## 2024-10-10 PROBLEM — N13.30 HYDROURETERONEPHROSIS: Status: ACTIVE | Noted: 2024-10-10

## 2024-10-10 PROBLEM — N20.1 URETEROLITHIASIS: Status: ACTIVE | Noted: 2024-10-10

## 2024-10-10 PROBLEM — I10 ESSENTIAL HYPERTENSION: Status: ACTIVE | Noted: 2024-10-10

## 2024-10-10 PROBLEM — F31.9 BIPOLAR DEPRESSION: Status: ACTIVE | Noted: 2024-10-10

## 2024-10-10 PROBLEM — G47.00 INSOMNIA: Status: ACTIVE | Noted: 2024-10-10

## 2024-10-10 PROBLEM — N17.9 AKI (ACUTE KIDNEY INJURY): Status: ACTIVE | Noted: 2024-10-10

## 2024-10-10 LAB
ALBUMIN SERPL BCP-MCNC: 3.6 G/DL (ref 3.5–5.2)
ALP SERPL-CCNC: 78 U/L (ref 55–135)
ALT SERPL W/O P-5'-P-CCNC: 46 U/L (ref 10–44)
ANION GAP SERPL CALC-SCNC: 10 MMOL/L (ref 8–16)
AST SERPL-CCNC: 39 U/L (ref 10–40)
BASOPHILS # BLD AUTO: 0.06 K/UL (ref 0–0.2)
BASOPHILS NFR BLD: 0.5 % (ref 0–1.9)
BILIRUB SERPL-MCNC: 1.2 MG/DL (ref 0.1–1)
BUN SERPL-MCNC: 20 MG/DL (ref 6–20)
CALCIUM SERPL-MCNC: 8.6 MG/DL (ref 8.7–10.5)
CHLORIDE SERPL-SCNC: 108 MMOL/L (ref 95–110)
CO2 SERPL-SCNC: 23 MMOL/L (ref 23–29)
CREAT SERPL-MCNC: 1.9 MG/DL (ref 0.5–1.4)
DIFFERENTIAL METHOD BLD: ABNORMAL
EOSINOPHIL # BLD AUTO: 0.2 K/UL (ref 0–0.5)
EOSINOPHIL NFR BLD: 1.4 % (ref 0–8)
ERYTHROCYTE [DISTWIDTH] IN BLOOD BY AUTOMATED COUNT: 13.8 % (ref 11.5–14.5)
EST. GFR  (NO RACE VARIABLE): 46 ML/MIN/1.73 M^2
GLUCOSE SERPL-MCNC: 97 MG/DL (ref 70–110)
HCT VFR BLD AUTO: 39.8 % (ref 40–54)
HGB BLD-MCNC: 13.2 G/DL (ref 14–18)
IMM GRANULOCYTES # BLD AUTO: 0.04 K/UL (ref 0–0.04)
IMM GRANULOCYTES NFR BLD AUTO: 0.4 % (ref 0–0.5)
LYMPHOCYTES # BLD AUTO: 2.2 K/UL (ref 1–4.8)
LYMPHOCYTES NFR BLD: 19.5 % (ref 18–48)
MCH RBC QN AUTO: 28.1 PG (ref 27–31)
MCHC RBC AUTO-ENTMCNC: 33.2 G/DL (ref 32–36)
MCV RBC AUTO: 85 FL (ref 82–98)
MONOCYTES # BLD AUTO: 1.3 K/UL (ref 0.3–1)
MONOCYTES NFR BLD: 11.7 % (ref 4–15)
NEUTROPHILS # BLD AUTO: 7.3 K/UL (ref 1.8–7.7)
NEUTROPHILS NFR BLD: 66.5 % (ref 38–73)
NRBC BLD-RTO: 0 /100 WBC
PLATELET # BLD AUTO: 201 K/UL (ref 150–450)
PMV BLD AUTO: 12.5 FL (ref 9.2–12.9)
POTASSIUM SERPL-SCNC: 3.9 MMOL/L (ref 3.5–5.1)
PROT SERPL-MCNC: 6.4 G/DL (ref 6–8.4)
RBC # BLD AUTO: 4.69 M/UL (ref 4.6–6.2)
SODIUM SERPL-SCNC: 141 MMOL/L (ref 136–145)
WBC # BLD AUTO: 11.01 K/UL (ref 3.9–12.7)

## 2024-10-10 PROCEDURE — 80053 COMPREHEN METABOLIC PANEL: CPT | Performed by: FAMILY MEDICINE

## 2024-10-10 PROCEDURE — 63600175 PHARM REV CODE 636 W HCPCS: Performed by: FAMILY MEDICINE

## 2024-10-10 PROCEDURE — 99222 1ST HOSP IP/OBS MODERATE 55: CPT | Mod: ,,, | Performed by: PHYSICIAN ASSISTANT

## 2024-10-10 PROCEDURE — 99214 OFFICE O/P EST MOD 30 MIN: CPT | Mod: ,,, | Performed by: SPECIALIST

## 2024-10-10 PROCEDURE — G0378 HOSPITAL OBSERVATION PER HR: HCPCS

## 2024-10-10 PROCEDURE — 63600175 PHARM REV CODE 636 W HCPCS: Performed by: SPECIALIST

## 2024-10-10 PROCEDURE — 99900035 HC TECH TIME PER 15 MIN (STAT)

## 2024-10-10 PROCEDURE — 25000003 PHARM REV CODE 250: Performed by: FAMILY MEDICINE

## 2024-10-10 PROCEDURE — 96361 HYDRATE IV INFUSION ADD-ON: CPT

## 2024-10-10 PROCEDURE — 94760 N-INVAS EAR/PLS OXIMETRY 1: CPT

## 2024-10-10 PROCEDURE — 85025 COMPLETE CBC W/AUTO DIFF WBC: CPT | Performed by: FAMILY MEDICINE

## 2024-10-10 PROCEDURE — 96376 TX/PRO/DX INJ SAME DRUG ADON: CPT

## 2024-10-10 PROCEDURE — 25000003 PHARM REV CODE 250: Performed by: PHYSICIAN ASSISTANT

## 2024-10-10 PROCEDURE — 96375 TX/PRO/DX INJ NEW DRUG ADDON: CPT

## 2024-10-10 PROCEDURE — 36415 COLL VENOUS BLD VENIPUNCTURE: CPT | Performed by: FAMILY MEDICINE

## 2024-10-10 RX ORDER — MORPHINE SULFATE 2 MG/ML
2 INJECTION, SOLUTION INTRAMUSCULAR; INTRAVENOUS EVERY 4 HOURS PRN
Status: DISCONTINUED | OUTPATIENT
Start: 2024-10-10 | End: 2024-10-11

## 2024-10-10 RX ORDER — ALPRAZOLAM 0.5 MG/1
0.5 TABLET ORAL NIGHTLY PRN
Status: DISCONTINUED | OUTPATIENT
Start: 2024-10-10 | End: 2024-10-11 | Stop reason: HOSPADM

## 2024-10-10 RX ORDER — PANTOPRAZOLE SODIUM 40 MG/1
40 TABLET, DELAYED RELEASE ORAL DAILY
Status: DISCONTINUED | OUTPATIENT
Start: 2024-10-10 | End: 2024-10-11 | Stop reason: HOSPADM

## 2024-10-10 RX ORDER — LORAZEPAM 2 MG/ML
1 INJECTION INTRAMUSCULAR ONCE
Status: COMPLETED | OUTPATIENT
Start: 2024-10-10 | End: 2024-10-10

## 2024-10-10 RX ORDER — TALC
6 POWDER (GRAM) TOPICAL NIGHTLY PRN
Status: DISCONTINUED | OUTPATIENT
Start: 2024-10-10 | End: 2024-10-11 | Stop reason: HOSPADM

## 2024-10-10 RX ORDER — HYDROMORPHONE HYDROCHLORIDE 1 MG/ML
1 INJECTION, SOLUTION INTRAMUSCULAR; INTRAVENOUS; SUBCUTANEOUS
Status: DISCONTINUED | OUTPATIENT
Start: 2024-10-10 | End: 2024-10-11

## 2024-10-10 RX ORDER — SODIUM CHLORIDE 0.9 % (FLUSH) 0.9 %
10 SYRINGE (ML) INJECTION
Status: DISCONTINUED | OUTPATIENT
Start: 2024-10-10 | End: 2024-10-11 | Stop reason: HOSPADM

## 2024-10-10 RX ORDER — MORPHINE SULFATE 4 MG/ML
4 INJECTION, SOLUTION INTRAMUSCULAR; INTRAVENOUS EVERY 4 HOURS PRN
Status: DISCONTINUED | OUTPATIENT
Start: 2024-10-10 | End: 2024-10-10 | Stop reason: SDUPTHER

## 2024-10-10 RX ORDER — SODIUM CHLORIDE 9 MG/ML
INJECTION, SOLUTION INTRAVENOUS CONTINUOUS
Status: DISCONTINUED | OUTPATIENT
Start: 2024-10-10 | End: 2024-10-11

## 2024-10-10 RX ORDER — TAMSULOSIN HYDROCHLORIDE 0.4 MG/1
0.4 CAPSULE ORAL DAILY
Status: DISCONTINUED | OUTPATIENT
Start: 2024-10-10 | End: 2024-10-11 | Stop reason: HOSPADM

## 2024-10-10 RX ORDER — HYDRALAZINE HYDROCHLORIDE 20 MG/ML
10 INJECTION INTRAMUSCULAR; INTRAVENOUS EVERY 6 HOURS PRN
Status: DISCONTINUED | OUTPATIENT
Start: 2024-10-10 | End: 2024-10-11 | Stop reason: HOSPADM

## 2024-10-10 RX ADMIN — PANTOPRAZOLE SODIUM 40 MG: 40 TABLET, DELAYED RELEASE ORAL at 11:10

## 2024-10-10 RX ADMIN — MORPHINE SULFATE 4 MG: 4 INJECTION INTRAVENOUS at 02:10

## 2024-10-10 RX ADMIN — MORPHINE SULFATE 2 MG: 2 INJECTION, SOLUTION INTRAMUSCULAR; INTRAVENOUS at 11:10

## 2024-10-10 RX ADMIN — HYDROMORPHONE HYDROCHLORIDE 1 MG: 1 INJECTION, SOLUTION INTRAMUSCULAR; INTRAVENOUS; SUBCUTANEOUS at 06:10

## 2024-10-10 RX ADMIN — HYDROMORPHONE HYDROCHLORIDE 1 MG: 1 INJECTION, SOLUTION INTRAMUSCULAR; INTRAVENOUS; SUBCUTANEOUS at 09:10

## 2024-10-10 RX ADMIN — SODIUM CHLORIDE: 9 INJECTION, SOLUTION INTRAVENOUS at 12:10

## 2024-10-10 RX ADMIN — HYDROMORPHONE HYDROCHLORIDE 1 MG: 1 INJECTION, SOLUTION INTRAMUSCULAR; INTRAVENOUS; SUBCUTANEOUS at 02:10

## 2024-10-10 RX ADMIN — MORPHINE SULFATE 4 MG: 4 INJECTION INTRAVENOUS at 06:10

## 2024-10-10 RX ADMIN — SODIUM CHLORIDE: 9 INJECTION, SOLUTION INTRAVENOUS at 11:10

## 2024-10-10 RX ADMIN — LORAZEPAM 1 MG: 2 INJECTION INTRAMUSCULAR; INTRAVENOUS at 12:10

## 2024-10-10 RX ADMIN — TAMSULOSIN HYDROCHLORIDE 0.4 MG: 0.4 CAPSULE ORAL at 11:10

## 2024-10-10 NOTE — NURSING
Pt up from ED via wheelchair. Report received from Mary. Pt oriented to the room. Care plan reviewed.

## 2024-10-10 NOTE — ED PROVIDER NOTES
Encounter Date: 10/9/2024       History     Chief Complaint   Patient presents with    Male  Problem     Pt to ED with c/o left sided lower abdominal pain and difficulty urinating; reports dx with kidney stone 1 week ago.      HPI  34-year-old male with a history of hypertension, nephrolithiasis and multiple procedures done for kidney stones with Dr. Washington presents to the ED with worsening left lower abdominal pain and left flank pain.  Patient does report difficulty urinating.  He states that he does have kidney stones bilaterally.  He denies any chest pain, shortness of breath.  No known fevers, chills.  He does endorse nausea with no vomiting or diarrhea.  Review of patient's allergies indicates:  No Known Allergies  Past Medical History:   Diagnosis Date    Hypertension     Kidney stones     UTI (urinary tract infection)     diagnosed on 4/16/16     Past Surgical History:   Procedure Laterality Date    EXTRACORPOREAL SHOCK WAVE LITHOTRIPSY  2016    multiple since 2009    SKIN GRAFT Right     s/p traumatic injury to foot at 1 y/o    TOE AMPUTATION Right     due to  accident at 1 y/o     Family History   Problem Relation Name Age of Onset    Diabetes Maternal Aunt      Heart disease Maternal Aunt      Diabetes Maternal Grandmother      Hypertension Maternal Grandmother      Heart disease Maternal Grandmother      Prostate cancer Neg Hx      Kidney disease Neg Hx       Social History     Tobacco Use    Smoking status: Never    Smokeless tobacco: Never   Substance Use Topics    Alcohol use: No    Drug use: No     Review of Systems   Constitutional:  Negative for chills and fever.   HENT:  Negative for congestion and sore throat.    Respiratory:  Negative for shortness of breath and wheezing.    Cardiovascular:  Negative for chest pain and palpitations.   Gastrointestinal:  Positive for abdominal pain and nausea. Negative for constipation, diarrhea and vomiting.   Genitourinary:  Positive for flank pain.    Musculoskeletal:  Negative for back pain.   Skin:  Negative for rash.   Neurological:  Negative for headaches.   Psychiatric/Behavioral:  Negative for behavioral problems.    All other systems reviewed and are negative.      Physical Exam     Initial Vitals [10/09/24 2024]   BP Pulse Resp Temp SpO2   (!) 176/101 107 20 97.8 °F (36.6 °C) 96 %      MAP       --         Physical Exam    Constitutional: He appears well-developed and well-nourished. He is not diaphoretic. No distress.   HENT:   Head: Normocephalic and atraumatic.   Right Ear: External ear normal.   Left Ear: External ear normal.   Eyes: EOM are normal. Pupils are equal, round, and reactive to light.   Neck:   Normal range of motion.  Cardiovascular:  Normal rate and regular rhythm.           No murmur heard.  Pulmonary/Chest: Breath sounds normal. No respiratory distress. He has no wheezes.   Abdominal: Abdomen is soft. He exhibits no distension. There is abdominal tenderness (left lateral).   Musculoskeletal:         General: Tenderness present.      Cervical back: Normal range of motion.     Neurological: He is alert and oriented to person, place, and time. Cranial nerve deficit: left flank. GCS score is 15. GCS eye subscore is 4. GCS verbal subscore is 5. GCS motor subscore is 6.   Skin: Skin is warm and dry. No rash noted.   Psychiatric: He has a normal mood and affect.         ED Course   Procedures  Labs Reviewed   CBC W/ AUTO DIFFERENTIAL - Abnormal       Result Value    WBC 14.90 (*)     RBC 4.93      Hemoglobin 14.0      Hematocrit 41.1      MCV 83      MCH 28.4      MCHC 34.1      RDW 13.7      Platelets 212      MPV 11.8      Immature Granulocytes 0.4      Gran # (ANC) 9.2 (*)     Immature Grans (Abs) 0.06 (*)     Lymph # 3.8      Mono # 1.7 (*)     Eos # 0.2      Baso # 0.08      nRBC 0      Gran % 61.7      Lymph % 25.2      Mono % 11.1      Eosinophil % 1.1      Basophil % 0.5      Differential Method Automated     COMPREHENSIVE METABOLIC  PANEL - Abnormal    Sodium 141      Potassium 3.8      Chloride 106      CO2 21 (*)     Glucose 97      BUN 19      Creatinine 1.9 (*)     Calcium 9.4      Total Protein 7.0      Albumin 4.0      Total Bilirubin 0.8      Alkaline Phosphatase 85      AST 44 (*)     ALT 53 (*)     eGFR 46 (*)     Anion Gap 14     URINALYSIS, REFLEX TO URINE CULTURE - Abnormal    Specimen UA Urine, Clean Catch      Color, UA Yellow      Appearance, UA Hazy (*)     pH, UA 6.0      Specific Gravity, UA 1.015      Protein, UA Negative      Glucose, UA Negative      Ketones, UA Negative      Bilirubin (UA) Negative      Occult Blood UA 3+ (*)     Nitrite, UA Negative      Urobilinogen, UA Negative      Leukocytes, UA Trace (*)     Narrative:     Specimen Source->Urine   URINALYSIS MICROSCOPIC - Abnormal    RBC, UA >100 (*)     WBC, UA 5      Bacteria Rare      Squam Epithel, UA 2      Ca Oxalate Crystal, UA Occasional      Microscopic Comment SEE COMMENT      Narrative:     Specimen Source->Urine   BASIC METABOLIC PANEL - Abnormal    Sodium 142      Potassium 3.7      Chloride 110      CO2 20 (*)     Glucose 88      BUN 19      Creatinine 1.8 (*)     Calcium 8.6 (*)     Anion Gap 12      eGFR 49 (*)    LIPASE    Lipase 30            Imaging Results              CT Renal Stone Study ABD Pelvis WO (Final result)  Result time 10/09/24 21:39:35      Final result by Apolinar Penaloza MD (10/09/24 21:39:35)                   Impression:      Hepatomegaly with diffuse steatosis.    Multiple nonobstructing right renal calculi with interval increased stone burden compared to prior.    Moderate left hydroureteronephrosis down to an obstructing 5.8 mm calculus in the distal left ureter.    Additional findings as above.      Electronically signed by: Apolinar Penaloza MD  Date:    10/09/2024  Time:    21:39               Narrative:    EXAMINATION:  CT RENAL STONE STUDY ABD PELVIS WO    CLINICAL HISTORY:  Flank pain, kidney stone  suspected;    TECHNIQUE:  Low dose axial images, sagittal and coronal reformations were obtained from the lung bases to the pubic symphysis.  Contrast was not administered.    COMPARISON:  03/12/2022.    FINDINGS:  Heart: Normal in size. No pericardial effusion.    Lung Bases: Well aerated, without consolidation or pleural fluid.    Liver: Hepatomegaly with diffuse steatosis, similar to prior.    Gallbladder: No calcified gallstones.    Bile Ducts: No evidence of dilated ducts.    Pancreas: No mass or peripancreatic fat stranding.    Spleen: Unremarkable.    Adrenals: Unremarkable.    Kidneys/ Ureters: Multiple nonobstructing right renal calculi with interval increased stone burden compared to prior.  Moderate left hydroureteronephrosis down to an obstructing 5.8 mm calculus in the distal left ureter.    Bladder: No evidence of wall thickening.    Reproductive organs: Unremarkable.    GI Tract/Mesentery: No evidence of bowel obstruction or inflammation.  Normal appendix.    Peritoneal Space: No ascites. No free air.    Retroperitoneum: No significant adenopathy.    Abdominal wall: Small fat containing umbilical hernia.    Vasculature: No significant atherosclerosis or aneurysm.    Bones: No acute fracture.                                       Medications   0.9%  NaCl infusion (0 mLs Intravenous Stopped 10/9/24 2157)   ketorolac injection 15 mg (15 mg Intravenous Given 10/9/24 2113)   ondansetron injection 4 mg (4 mg Intravenous Given 10/9/24 2114)   morphine injection 4 mg (4 mg Intravenous Given 10/9/24 2143)   0.9%  NaCl infusion (0 mLs Intravenous Stopped 10/9/24 2313)   morphine injection 2 mg (2 mg Intravenous Given 10/9/24 2230)     Medical Decision Making  Differential diagnosis:  Dehydration, nephrolithiasis, UTI    34-year-old male with a known history of nephrolithiasis presents to the ED with left-sided flank pain.  Patient with multiple previous stents.  Vital signs reviewed by myself which does  demonstrate tachycardia initially.  Initial blood pressure 176/106, heart rate 107, respiratory rate 20, afebrile, 98% on room air.  Reviewed labs which demonstrate elevation of creatinine at 1.9 from baseline.  Patient given 2 L of fluid with repeat of BMP which demonstrates 1.8.  CT scan demonstrates 5.8 mm stone nonobstructing with hydronephrosis.  Patient not previously on Flomax.  Pain significantly improved with Toradol, morphine, however creatinine remains high.  Patient was given fluids without significant improvement of creatinine.  Patient will be admitted for observation for IV fluids and consult placed to Dr. [facility].  Patient understands plan of care.  Courtesy bridge orders entered by myself..    Amount and/or Complexity of Data Reviewed  Labs: ordered.  Radiology: ordered.    Risk  Prescription drug management.                                      Clinical Impression:  Final diagnoses:  [N17.9] PEDRO (acute kidney injury) (Primary)  [N20.0] Left nephrolithiasis          ED Disposition Condition    Observation Stable                Rosa Maria Kent MD  10/10/24 0042

## 2024-10-10 NOTE — PLAN OF CARE
[place] [facility] (3rd Fl)  Discharge Assessment    Primary Care Provider: Talha Cook MD     Discharge Assessment (most recent)       BRIEF DISCHARGE ASSESSMENT - 10/10/24 0007          Discharge Planning    Assessment Type Discharge Planning Brief Assessment (P)      Resource/Environmental Concerns none (P)      Support Systems Spouse/significant other;Children;Family members;Friends/neighbors (P)      Assistance Needed none (P)      Equipment Currently Used at Home none (P)      Current Living Arrangements home (P)      Patient/Family Anticipates Transition to home (P)      Patient/Family Anticipated Services at Transition none (P)      DME Needed Upon Discharge  none (P)      Discharge Plan A Home (P)      Discharge Plan B Home with family (P)         Physical Activity    On average, how many days per week do you engage in moderate to strenuous exercise (like a brisk walk)? 0 days (P)      On average, how many minutes do you engage in exercise at this level? 0 min (P)         Social Isolation    How often do you feel lonely or isolated from those around you?  Never (P)         Alcohol Use    Q1: How often do you have a drink containing alcohol? Never (P)      Q2: How many drinks containing alcohol do you have on a typical day when you are drinking? Patient does not drink (P)      Q3: How often do you have six or more drinks on one occasion? Never (P)                      Discharge assessment completed at bedside with patient. Patient lives at home with spouse and dependent children. Patient is independent with ADL's and uses no DME. NO post acute needs identified at this time. CM to remain available to assist with discharge should needs arise.

## 2024-10-10 NOTE — ASSESSMENT & PLAN NOTE
PEDRO is likely due to post-obstructive d/t ureterolithiasis . Baseline creatinine is  1.2 . Most recent creatinine and eGFR are listed below.  Recent Labs     10/09/24  2112 10/09/24  2315 10/10/24  0440   CREATININE 1.9* 1.8* 1.9*   EGFRNORACEVR 46* 49* 46*      Plan  - PEDRO is stable  - Avoid nephrotoxins and renally dose meds for GFR listed above  - Monitor urine output, serial BMP, and adjust therapy as needed

## 2024-10-10 NOTE — HPI
Patient is a 34 year old male with medical history of bipolar depression, insomnia, HTN and nephrolithiasis who presented to the ED with sharp abdominal pain in the left lower quadrant/flank area.  He has had nausea but denies fever and vomiting.  He has h/o nephrolithiasis with multiple lithotripsy and has stent placement.  He decided to come to the ED due to uncontrollable pain.        Admitted for PEDRO secondary to ureterolithiasis.

## 2024-10-10 NOTE — TELEPHONE ENCOUNTER
Wife calling, pt was dx with kidney stone 1 week ago. Pt took seroquel medication at 1800. Pt reports strong urge to urinate with only a small amount of urine coming out. Pt was prescribed pain meds but only 4 pills so he is out now. No relief with Excedrin OTC. Blood in urine.     Dispo is to go to ED/UCC now. Recommended ED s/t to the time of night and resources available. Care advice given. Pt v/u.     Reason for Disposition   [1] SEVERE pain (e.g., excruciating, scale 8-10) AND [2] not improved after pain medicine    Additional Information   Negative: Shock suspected (e.g., cold/pale/clammy skin, too weak to stand, low BP, rapid pulse)   Negative: Sounds like a life-threatening emergency to the triager   Negative: [1] Unable to urinate (or only a few drops) > 4 hours AND [2] bladder feels very full (e.g., palpable bladder or strong urge to urinate)    Protocols used: Kidney Stone Follow-up Call-A-

## 2024-10-10 NOTE — SUBJECTIVE & OBJECTIVE
Past Medical History:   Diagnosis Date    Hypertension     Kidney stones     UTI (urinary tract infection)     diagnosed on 4/16/16       Past Surgical History:   Procedure Laterality Date    EXTRACORPOREAL SHOCK WAVE LITHOTRIPSY  2016    multiple since 2009    SKIN GRAFT Right     s/p traumatic injury to foot at 1 y/o    TOE AMPUTATION Right     due to  accident at 1 y/o       Review of patient's allergies indicates:  No Known Allergies    No current facility-administered medications on file prior to encounter.     Current Outpatient Medications on File Prior to Encounter   Medication Sig    ALPRAZolam (XANAX) 0.5 MG tablet alprazolam 0.5 mg tablet   TAKE 1 TABLET BY MOUTH EVERY DAY AS NEEDED    pantoprazole (PROTONIX) 40 MG tablet Take 40 mg by mouth once daily.    quetiapine (SEROQUEL) 300 MG Tab Take by mouth.    ibuprofen (ADVIL,MOTRIN) 800 MG tablet Take 1 tablet (800 mg total) by mouth every 6 (six) hours as needed for Pain.    LIDOcaine (LIDODERM) 5 % Place 1 patch onto the skin once daily. Remove & Discard patch within 12 hours or as directed by MD (Patient not taking: Reported on 3/16/2022)    methylPREDNISolone (MEDROL DOSEPACK) 4 mg tablet use as directed    oxyCODONE-acetaminophen (PERCOCET) 5-325 mg per tablet Take 1 tablet by mouth every 6 (six) hours as needed for Pain.     Family History       Problem Relation (Age of Onset)    Diabetes Maternal Aunt, Maternal Grandmother    Heart disease Maternal Aunt, Maternal Grandmother    Hypertension Maternal Grandmother          Tobacco Use    Smoking status: Never    Smokeless tobacco: Never   Substance and Sexual Activity    Alcohol use: No    Drug use: No    Sexual activity: Yes     Partners: Female     Review of Systems   Constitutional:  Negative for chills and fever.   HENT:  Negative for ear discharge and ear pain.    Eyes:  Negative for pain and discharge.   Respiratory:  Negative for cough and shortness of breath.    Cardiovascular:   "Negative for chest pain and leg swelling.   Gastrointestinal:  Positive for abdominal pain and nausea. Negative for vomiting.   Endocrine: Negative for cold intolerance and heat intolerance.   Genitourinary:  Negative for difficulty urinating and dysuria.   Musculoskeletal:  Negative for joint swelling and myalgias.   Skin:  Negative for rash and wound.   Neurological:  Negative for dizziness and headaches.   Psychiatric/Behavioral:  Negative for agitation and confusion.      Objective:     Vital Signs (Most Recent):  Temp: 98.9 °F (37.2 °C) (10/10/24 0812)  Pulse: 95 (10/10/24 0812)  Resp: 18 (10/10/24 0812)  BP: 132/70 (10/10/24 0812)  SpO2: 95 % (10/10/24 0812) Vital Signs (24h Range):  Temp:  [97.7 °F (36.5 °C)-98.9 °F (37.2 °C)] 98.9 °F (37.2 °C)  Pulse:  [] 95  Resp:  [15-20] 18  SpO2:  [95 %-98 %] 95 %  BP: (125-179)/() 132/70     Weight: 125.9 kg (277 lb 9 oz)  Body mass index is 38.71 kg/m².     Physical Exam  Constitutional:       General: He is not in acute distress.  HENT:      Head: Normocephalic and atraumatic.   Eyes:      General:         Right eye: No discharge.         Left eye: No discharge.   Cardiovascular:      Rate and Rhythm: Normal rate and regular rhythm.   Pulmonary:      Effort: Pulmonary effort is normal.      Comments: Decreased breath sounds   Abdominal:      General: There is no distension.      Tenderness: There is no abdominal tenderness.   Musculoskeletal:         General: No swelling or tenderness.      Cervical back: Neck supple. No tenderness.   Skin:     General: Skin is warm and dry.   Neurological:      General: No focal deficit present.      Mental Status: He is alert and oriented to person, place, and time.   Psychiatric:         Mood and Affect: Mood normal.         Behavior: Behavior normal.                Significant Labs: A1C: No results for input(s): "HGBA1C" in the last 4320 hours.  ABGs: No results for input(s): "PH", "PCO2", "HCO3", "POCSATURATED", "BE", " ""TOTALHB", "COHB", "METHB", "O2HB", "POCFIO2", "PO2" in the last 48 hours.  Bilirubin:   Recent Labs   Lab 10/09/24  2112 10/10/24  0440   BILITOT 0.8 1.2*     Blood Culture: No results for input(s): "LABBLOO" in the last 48 hours.  BMP:   Recent Labs   Lab 10/10/24  0440   GLU 97      K 3.9      CO2 23   BUN 20   CREATININE 1.9*   CALCIUM 8.6*     CBC:   Recent Labs   Lab 10/09/24  2112 10/10/24  0440   WBC 14.90* 11.01   HGB 14.0 13.2*   HCT 41.1 39.8*    201     CMP:   Recent Labs   Lab 10/09/24  2112 10/09/24  2315 10/10/24  0440    142 141   K 3.8 3.7 3.9    110 108   CO2 21* 20* 23   GLU 97 88 97   BUN 19 19 20   CREATININE 1.9* 1.8* 1.9*   CALCIUM 9.4 8.6* 8.6*   PROT 7.0  --  6.4   ALBUMIN 4.0  --  3.6   BILITOT 0.8  --  1.2*   ALKPHOS 85  --  78   AST 44*  --  39   ALT 53*  --  46*   ANIONGAP 14 12 10     Cardiac Markers: No results for input(s): "CKMB", "MYOGLOBIN", "BNP", "TROPISTAT" in the last 48 hours.  Coagulation: No results for input(s): "PT", "INR", "APTT" in the last 48 hours.  Lactic Acid: No results for input(s): "LACTATE" in the last 48 hours.  Lipase:   Recent Labs   Lab 10/09/24  2112   LIPASE 30     Lipid Panel: No results for input(s): "CHOL", "HDL", "LDLCALC", "TRIG", "CHOLHDL" in the last 48 hours.  Magnesium: No results for input(s): "MG" in the last 48 hours.  POCT Glucose: No results for input(s): "POCTGLUCOSE" in the last 48 hours.  Prealbumin: No results for input(s): "PREALBUMIN" in the last 48 hours.  Respiratory Culture: No results for input(s): "GSRESP", "RESPIRATORYC" in the last 48 hours.  Troponin: No results for input(s): "TROPONINI", "TROPONINIHS" in the last 48 hours.  TSH: No results for input(s): "TSH" in the last 4320 hours.  Urine Culture: No results for input(s): "LABURIN" in the last 48 hours.  Urine Studies:   Recent Labs   Lab 10/09/24  2043   COLORU Yellow   APPEARANCEUA Hazy*   PHUR 6.0   SPECGRAV 1.015   PROTEINUA Negative   GLUCUA " Negative   KETONESU Negative   BILIRUBINUA Negative   OCCULTUA 3+*   NITRITE Negative   UROBILINOGEN Negative   LEUKOCYTESUR Trace*   RBCUA >100*   WBCUA 5   BACTERIA Rare   SQUAMEPITHEL 2       Significant Imaging: I have reviewed all pertinent imaging results/findings within the past 24 hours.

## 2024-10-10 NOTE — NURSING
Pt /101 manually. Presents with cold, clammy, sweaty skin. Reports 10/10 sharp, L lower abdominal pain. PAUL Hall and Dr. Paulson notified. Ativan 1 mg IV x 1 dose ordered per Dr. Paulson.

## 2024-10-10 NOTE — H&P
[facility] ([address] [place] [address] Medicine  History & Physical    Patient Name: Emmanuel Ervin Jr.  MRN: [id number]  Patient Class: OP- Observation  Admission Date: 10/9/2024  Attending Physician: Eva Canela MD   Primary Care Provider: Talha Cook MD         Patient information was obtained from patient and ER records.     Subjective:     Principal Problem:PEDRO (acute kidney injury)    Chief Complaint:   Chief Complaint   Patient presents with    Male  Problem     Pt to ED with c/o left sided lower abdominal pain and difficulty urinating; reports dx with kidney stone 1 week ago.         HPI: Patient is a 34 year old male with medical history of bipolar depression, insomnia, HTN and nephrolithiasis who presented to the ED with sharp abdominal pain in the left lower quadrant/flank area.  He has had nausea but denies fever and vomiting.  He has h/o nephrolithiasis with multiple lithotripsy and has stent placement.  He decided to come to the ED due to uncontrollable pain.        Admitted for PEDRO secondary to ureterolithiasis.      Past Medical History:   Diagnosis Date    Hypertension     Kidney stones     UTI (urinary tract infection)     diagnosed on 4/16/16       Past Surgical History:   Procedure Laterality Date    EXTRACORPOREAL SHOCK WAVE LITHOTRIPSY  2016    multiple since 2009    SKIN GRAFT Right     s/p traumatic injury to foot at 1 y/o    TOE AMPUTATION Right     due to  accident at 1 y/o       Review of patient's allergies indicates:  No Known Allergies    No current facility-administered medications on file prior to encounter.     Current Outpatient Medications on File Prior to Encounter   Medication Sig    ALPRAZolam (XANAX) 0.5 MG tablet alprazolam 0.5 mg tablet   TAKE 1 TABLET BY MOUTH EVERY DAY AS NEEDED    pantoprazole (PROTONIX) 40 MG tablet Take 40 mg by mouth once daily.    quetiapine (SEROQUEL) 300 MG Tab Take by mouth.    ibuprofen (ADVIL,MOTRIN) 800 MG tablet Take 1  tablet (800 mg total) by mouth every 6 (six) hours as needed for Pain.    LIDOcaine (LIDODERM) 5 % Place 1 patch onto the skin once daily. Remove & Discard patch within 12 hours or as directed by MD (Patient not taking: Reported on 3/16/2022)    methylPREDNISolone (MEDROL DOSEPACK) 4 mg tablet use as directed    oxyCODONE-acetaminophen (PERCOCET) 5-325 mg per tablet Take 1 tablet by mouth every 6 (six) hours as needed for Pain.     Family History       Problem Relation (Age of Onset)    Diabetes Maternal Aunt, Maternal Grandmother    Heart disease Maternal Aunt, Maternal Grandmother    Hypertension Maternal Grandmother          Tobacco Use    Smoking status: Never    Smokeless tobacco: Never   Substance and Sexual Activity    Alcohol use: No    Drug use: No    Sexual activity: Yes     Partners: Female     Review of Systems   Constitutional:  Negative for chills and fever.   HENT:  Negative for ear discharge and ear pain.    Eyes:  Negative for pain and discharge.   Respiratory:  Negative for cough and shortness of breath.    Cardiovascular:  Negative for chest pain and leg swelling.   Gastrointestinal:  Positive for abdominal pain and nausea. Negative for vomiting.   Endocrine: Negative for cold intolerance and heat intolerance.   Genitourinary:  Negative for difficulty urinating and dysuria.   Musculoskeletal:  Negative for joint swelling and myalgias.   Skin:  Negative for rash and wound.   Neurological:  Negative for dizziness and headaches.   Psychiatric/Behavioral:  Negative for agitation and confusion.      Objective:     Vital Signs (Most Recent):  Temp: 98.9 °F (37.2 °C) (10/10/24 0812)  Pulse: 95 (10/10/24 0812)  Resp: 18 (10/10/24 0812)  BP: 132/70 (10/10/24 0812)  SpO2: 95 % (10/10/24 0812) Vital Signs (24h Range):  Temp:  [97.7 °F (36.5 °C)-98.9 °F (37.2 °C)] 98.9 °F (37.2 °C)  Pulse:  [] 95  Resp:  [15-20] 18  SpO2:  [95 %-98 %] 95 %  BP: (125-179)/() 132/70     Weight: 125.9 kg (277 lb 9  "oz)  Body mass index is 38.71 kg/m².     Physical Exam  Constitutional:       General: He is not in acute distress.  HENT:      Head: Normocephalic and atraumatic.   Eyes:      General:         Right eye: No discharge.         Left eye: No discharge.   Cardiovascular:      Rate and Rhythm: Normal rate and regular rhythm.   Pulmonary:      Effort: Pulmonary effort is normal.      Comments: Decreased breath sounds   Abdominal:      General: There is no distension.      Tenderness: There is no abdominal tenderness.   Musculoskeletal:         General: No swelling or tenderness.      Cervical back: Neck supple. No tenderness.   Skin:     General: Skin is warm and dry.   Neurological:      General: No focal deficit present.      Mental Status: He is alert and oriented to person, place, and time.   Psychiatric:         Mood and Affect: Mood normal.         Behavior: Behavior normal.                Significant Labs: A1C: No results for input(s): "HGBA1C" in the last 4320 hours.  ABGs: No results for input(s): "PH", "PCO2", "HCO3", "POCSATURATED", "BE", "TOTALHB", "COHB", "METHB", "O2HB", "POCFIO2", "PO2" in the last 48 hours.  Bilirubin:   Recent Labs   Lab 10/09/24  2112 10/10/24  0440   BILITOT 0.8 1.2*     Blood Culture: No results for input(s): "LABBLOO" in the last 48 hours.  BMP:   Recent Labs   Lab 10/10/24  0440   GLU 97      K 3.9      CO2 23   BUN 20   CREATININE 1.9*   CALCIUM 8.6*     CBC:   Recent Labs   Lab 10/09/24  2112 10/10/24  0440   WBC 14.90* 11.01   HGB 14.0 13.2*   HCT 41.1 39.8*    201     CMP:   Recent Labs   Lab 10/09/24  2112 10/09/24  2315 10/10/24  0440    142 141   K 3.8 3.7 3.9    110 108   CO2 21* 20* 23   GLU 97 88 97   BUN 19 19 20   CREATININE 1.9* 1.8* 1.9*   CALCIUM 9.4 8.6* 8.6*   PROT 7.0  --  6.4   ALBUMIN 4.0  --  3.6   BILITOT 0.8  --  1.2*   ALKPHOS 85  --  78   AST 44*  --  39   ALT 53*  --  46*   ANIONGAP 14 12 10     Cardiac Markers: No results for " "input(s): "CKMB", "MYOGLOBIN", "BNP", "TROPISTAT" in the last 48 hours.  Coagulation: No results for input(s): "PT", "INR", "APTT" in the last 48 hours.  Lactic Acid: No results for input(s): "LACTATE" in the last 48 hours.  Lipase:   Recent Labs   Lab 10/09/24  2112   LIPASE 30     Lipid Panel: No results for input(s): "CHOL", "HDL", "LDLCALC", "TRIG", "CHOLHDL" in the last 48 hours.  Magnesium: No results for input(s): "MG" in the last 48 hours.  POCT Glucose: No results for input(s): "POCTGLUCOSE" in the last 48 hours.  Prealbumin: No results for input(s): "PREALBUMIN" in the last 48 hours.  Respiratory Culture: No results for input(s): "GSRESP", "RESPIRATORYC" in the last 48 hours.  Troponin: No results for input(s): "TROPONINI", "TROPONINIHS" in the last 48 hours.  TSH: No results for input(s): "TSH" in the last 4320 hours.  Urine Culture: No results for input(s): "LABURIN" in the last 48 hours.  Urine Studies:   Recent Labs   Lab 10/09/24  2043   COLORU Yellow   APPEARANCEUA Hazy*   PHUR 6.0   SPECGRAV 1.015   PROTEINUA Negative   GLUCUA Negative   KETONESU Negative   BILIRUBINUA Negative   OCCULTUA 3+*   NITRITE Negative   UROBILINOGEN Negative   LEUKOCYTESUR Trace*   RBCUA >100*   WBCUA 5   BACTERIA Rare   SQUAMEPITHEL 2       Significant Imaging: I have reviewed all pertinent imaging results/findings within the past 24 hours.  Assessment/Plan:     * PEDRO (acute kidney injury)  PEDRO is likely due to post-obstructive d/t ureterolithiasis . Baseline creatinine is  1.2 . Most recent creatinine and eGFR are listed below.  Recent Labs     10/09/24  2112 10/09/24  2315 10/10/24  0440   CREATININE 1.9* 1.8* 1.9*   EGFRNORACEVR 46* 49* 46*      Plan  - PEDRO is stable  - Avoid nephrotoxins and renally dose meds for GFR listed above  - Monitor urine output, serial BMP, and adjust therapy as needed      Essential hypertension  Patients blood pressure range in the last 24 hours was: BP  Min: 125/72  Max: 179/98.The " patient's inpatient anti-hypertensive regimen is listed below:  Current Antihypertensives       Plan  Hold losartan in the setting of PEDRO     Insomnia  On seroquel   Nursing verifying dosage       Hydroureteronephrosis  Moderate left hydroureteronephrosis seen on CT Renal stone study         Ureterolithiasis  Bilateral   Obstructing left 5.8mm calculus in the distal left ureter.  Multiple nonobstructing right renal calculi   Urology consulted  and NPO at midnight  Pain control   Flomax added   IV fluids       Bipolar depression  On lamotrigine at home.  Nursing staff calling pharmacy to verify dosage         VTE Risk Mitigation (From admission, onward)           Ordered     IP VTE HIGH RISK PATIENT  Once         10/10/24 0044     Place sequential compression device  Until discontinued         10/10/24 0044                         On 10/10/2024, patient should be placed in hospital observation services under my care in collaboration with Dr. Canela.           ROBERTO GonzalezC  Department of Hospital Medicine  [place] [facility] ([facility])

## 2024-10-10 NOTE — CONSULTS
[place] - Med [facility] ([facility])  Urology  Consult Note  [facility]  Urology          Patient Name: Emmanuel Ervin Jr.  MRN: [id number]  Admission Date: 10/9/2024  Code Status: Full Code   Attending Provider: CARL HE MD  Primary Care Physician: Talha Cook MD  Principal Problem:PEDRO (acute kidney injury)    Subjective:     HPI: 34 y.o M with hx of recurrent nephrolithiasis, typically managed at [facility].  He mentions the bulk of his stones have been from the right kidney.  However, he presented to the ED yesterday with intractable left flank pain.  CT scan c/w 4-5 mm left distal stone with moderate hydro.  Numerous stones right lower and mid poles, non-obstructing.    Past Medical History:   Diagnosis Date    Hypertension     Kidney stones     UTI (urinary tract infection)     diagnosed on 4/16/16       Past Surgical History:   Procedure Laterality Date    EXTRACORPOREAL SHOCK WAVE LITHOTRIPSY  2016    multiple since 2009    SKIN GRAFT Right     s/p traumatic injury to foot at 1 y/o    TOE AMPUTATION Right     due to  accident at 1 y/o       Review of patient's allergies indicates:  No Known Allergies    Family History       Problem Relation (Age of Onset)    Diabetes Maternal Aunt, Maternal Grandmother    Heart disease Maternal Aunt, Maternal Grandmother    Hypertension Maternal Grandmother            Tobacco Use    Smoking status: Never    Smokeless tobacco: Never   Substance and Sexual Activity    Alcohol use: No    Drug use: No    Sexual activity: Yes     Partners: Female       Review of Systems   Constitutional:  Negative for chills and fever.       Objective:     Temp:  [97.7 °F (36.5 °C)-98.9 °F (37.2 °C)] 98.9 °F (37.2 °C)  Pulse:  [] 95  Resp:  [15-20] 18  SpO2:  [95 %-98 %] 95 %  BP: (125-179)/() 132/70     Body mass index is 38.71 kg/m².    I/O last 3 completed shifts:  In: 3533.3 [I.V.:3533.3]  Out: 325 [Urine:325]       Lines/Drains/Airways        Peripheral Intravenous Line  Duration                  Peripheral IV - Single Lumen 10/09/24 2110 18 G Left Antecubital <1 day                    Physical Exam  Constitutional:       Appearance: Normal appearance.   HENT:      Head: Normocephalic and atraumatic.      Right Ear: External ear normal.      Left Ear: External ear normal.   Eyes:      Extraocular Movements: Extraocular movements intact.   Cardiovascular:      Rate and Rhythm: Normal rate.   Pulmonary:      Effort: Pulmonary effort is normal.   Abdominal:      General: Abdomen is flat.   Musculoskeletal:      Cervical back: Neck supple.   Neurological:      General: No focal deficit present.      Mental Status: He is alert and oriented to person, place, and time.   Psychiatric:         Mood and Affect: Mood normal.         Behavior: Behavior normal.         Significant Labs:  BMP:  Recent Labs   Lab 10/09/24  2112 10/09/24  2315 10/10/24  0440    142 141   K 3.8 3.7 3.9    110 108   CO2 21* 20* 23   BUN 19 19 20   CREATININE 1.9* 1.8* 1.9*   CALCIUM 9.4 8.6* 8.6*       CBC:  Recent Labs   Lab 10/09/24  2112 10/10/24  0440   WBC 14.90* 11.01   HGB 14.0 13.2*   HCT 41.1 39.8*    201       All pertinent labs results from the past 24 hours have been reviewed.    Significant Imaging:  All pertinent imaging results/findings from the past 24 hours have been reviewed.    Assessment and Plan:     Active Diagnoses:    Diagnosis Date Noted POA    PRINCIPAL PROBLEM:  PEDRO (acute kidney injury) [N17.9] 10/10/2024 Yes    Bipolar depression [F31.9] 10/10/2024 Yes    Ureterolithiasis [N20.1] 10/10/2024 Yes    Hydroureteronephrosis [N13.30] 10/10/2024 Yes    Insomnia [G47.00] 10/10/2024 Yes    Essential hypertension [I10] 10/10/2024 Yes      Problems Resolved During this Admission:       VTE Risk Mitigation (From admission, onward)           Ordered     IP VTE HIGH RISK PATIENT  Once         10/10/24 0044     Place sequential compression device  Until  discontinued         10/10/24 0044                  Imp/Plan:  I suspect this 4-5 mm stone will pass spontaneously, however, patient mentions he usually has to go to the OR.  He also mentions that whenever he has an indwelling stent, he needs to go back to the OR to have it removed.  I offered going to the OR today for a JJ stent.  However, we do not have a Holmium laser on site.  To avoid repeated trips to the OR (three), and based upon the likelihood he may pass this stone spontaneously, we agreed to proceed with conservative management.  We will keep him NPO after midnight and reassess tomorrow.  CARL HE MD  Urology  [place] [facility] (3rd Fl)

## 2024-10-10 NOTE — ASSESSMENT & PLAN NOTE
Bilateral   Obstructing left 5.8mm calculus in the distal left ureter.  Multiple nonobstructing right renal calculi   Urology consulted  and NPO at midnight  Pain control   Flomax added

## 2024-10-10 NOTE — PLAN OF CARE
Problem: Adult Inpatient Plan of Care  Goal: Plan of Care Review  Outcome: Progressing     Problem: Acute Kidney Injury/Impairment  Goal: Fluid and Electrolyte Balance  Outcome: Progressing     Problem: Acute Kidney Injury/Impairment  Goal: Effective Renal Function  Outcome: Progressing     Problem: Pain Acute  Goal: Optimal Pain Control and Function  Outcome: Progressing

## 2024-10-11 ENCOUNTER — TELEPHONE (OUTPATIENT)
Dept: UROLOGY | Facility: CLINIC | Age: 34
End: 2024-10-11
Payer: COMMERCIAL

## 2024-10-11 ENCOUNTER — ANESTHESIA (OUTPATIENT)
Dept: SURGERY | Facility: HOSPITAL | Age: 34
End: 2024-10-11
Payer: COMMERCIAL

## 2024-10-11 ENCOUNTER — ANESTHESIA EVENT (OUTPATIENT)
Dept: SURGERY | Facility: HOSPITAL | Age: 34
End: 2024-10-11
Payer: COMMERCIAL

## 2024-10-11 VITALS
BODY MASS INDEX: 38.86 KG/M2 | WEIGHT: 277.56 LBS | HEART RATE: 87 BPM | SYSTOLIC BLOOD PRESSURE: 146 MMHG | DIASTOLIC BLOOD PRESSURE: 96 MMHG | RESPIRATION RATE: 18 BRPM | OXYGEN SATURATION: 95 % | TEMPERATURE: 98 F | HEIGHT: 71 IN

## 2024-10-11 DIAGNOSIS — N20.0 NEPHROLITHIASIS: Primary | ICD-10-CM

## 2024-10-11 LAB
ANION GAP SERPL CALC-SCNC: 12 MMOL/L (ref 8–16)
BASOPHILS # BLD AUTO: 0.04 K/UL (ref 0–0.2)
BASOPHILS NFR BLD: 0.3 % (ref 0–1.9)
BUN SERPL-MCNC: 15 MG/DL (ref 6–20)
CALCIUM SERPL-MCNC: 9.5 MG/DL (ref 8.7–10.5)
CHLORIDE SERPL-SCNC: 105 MMOL/L (ref 95–110)
CO2 SERPL-SCNC: 20 MMOL/L (ref 23–29)
CREAT SERPL-MCNC: 1.6 MG/DL (ref 0.5–1.4)
DIFFERENTIAL METHOD BLD: ABNORMAL
EOSINOPHIL # BLD AUTO: 0.1 K/UL (ref 0–0.5)
EOSINOPHIL NFR BLD: 0.6 % (ref 0–8)
ERYTHROCYTE [DISTWIDTH] IN BLOOD BY AUTOMATED COUNT: 13.2 % (ref 11.5–14.5)
EST. GFR  (NO RACE VARIABLE): 57 ML/MIN/1.73 M^2
GLUCOSE SERPL-MCNC: 113 MG/DL (ref 70–110)
HCT VFR BLD AUTO: 41.5 % (ref 40–54)
HGB BLD-MCNC: 13.9 G/DL (ref 14–18)
IMM GRANULOCYTES # BLD AUTO: 0.07 K/UL (ref 0–0.04)
IMM GRANULOCYTES NFR BLD AUTO: 0.6 % (ref 0–0.5)
LYMPHOCYTES # BLD AUTO: 1.3 K/UL (ref 1–4.8)
LYMPHOCYTES NFR BLD: 10.5 % (ref 18–48)
MAGNESIUM SERPL-MCNC: 1.9 MG/DL (ref 1.6–2.6)
MCH RBC QN AUTO: 28.1 PG (ref 27–31)
MCHC RBC AUTO-ENTMCNC: 33.5 G/DL (ref 32–36)
MCV RBC AUTO: 84 FL (ref 82–98)
MONOCYTES # BLD AUTO: 1.3 K/UL (ref 0.3–1)
MONOCYTES NFR BLD: 10.2 % (ref 4–15)
NEUTROPHILS # BLD AUTO: 9.9 K/UL (ref 1.8–7.7)
NEUTROPHILS NFR BLD: 77.8 % (ref 38–73)
NRBC BLD-RTO: 0 /100 WBC
PLATELET # BLD AUTO: 181 K/UL (ref 150–450)
PMV BLD AUTO: 12.7 FL (ref 9.2–12.9)
POTASSIUM SERPL-SCNC: 4.1 MMOL/L (ref 3.5–5.1)
RBC # BLD AUTO: 4.94 M/UL (ref 4.6–6.2)
SODIUM SERPL-SCNC: 137 MMOL/L (ref 136–145)
WBC # BLD AUTO: 12.71 K/UL (ref 3.9–12.7)

## 2024-10-11 PROCEDURE — 37000009 HC ANESTHESIA EA ADD 15 MINS: Performed by: SPECIALIST

## 2024-10-11 PROCEDURE — 94760 N-INVAS EAR/PLS OXIMETRY 1: CPT

## 2024-10-11 PROCEDURE — 96361 HYDRATE IV INFUSION ADD-ON: CPT

## 2024-10-11 PROCEDURE — 36000707: Performed by: SPECIALIST

## 2024-10-11 PROCEDURE — G0378 HOSPITAL OBSERVATION PER HR: HCPCS

## 2024-10-11 PROCEDURE — 80048 BASIC METABOLIC PNL TOTAL CA: CPT | Performed by: PHYSICIAN ASSISTANT

## 2024-10-11 PROCEDURE — C2617 STENT, NON-COR, TEM W/O DEL: HCPCS | Performed by: SPECIALIST

## 2024-10-11 PROCEDURE — 25000003 PHARM REV CODE 250: Performed by: PHYSICIAN ASSISTANT

## 2024-10-11 PROCEDURE — 37000008 HC ANESTHESIA 1ST 15 MINUTES: Performed by: SPECIALIST

## 2024-10-11 PROCEDURE — 36000706: Performed by: SPECIALIST

## 2024-10-11 PROCEDURE — 83735 ASSAY OF MAGNESIUM: CPT | Performed by: PHYSICIAN ASSISTANT

## 2024-10-11 PROCEDURE — 99238 HOSP IP/OBS DSCHRG MGMT 30/<: CPT | Mod: ,,, | Performed by: PHYSICIAN ASSISTANT

## 2024-10-11 PROCEDURE — 96376 TX/PRO/DX INJ SAME DRUG ADON: CPT

## 2024-10-11 PROCEDURE — 71000033 HC RECOVERY, INTIAL HOUR: Performed by: SPECIALIST

## 2024-10-11 PROCEDURE — 85025 COMPLETE CBC W/AUTO DIFF WBC: CPT | Performed by: PHYSICIAN ASSISTANT

## 2024-10-11 PROCEDURE — D9220AH HC ANESTHESIA PROFESSIONAL FEE: Mod: QZ,P2 | Performed by: NURSE ANESTHETIST, CERTIFIED REGISTERED

## 2024-10-11 PROCEDURE — 63600175 PHARM REV CODE 636 W HCPCS: Performed by: FAMILY MEDICINE

## 2024-10-11 PROCEDURE — 00910 ANES TRANSURETHRAL PX NOS: CPT | Mod: QZ,P2 | Performed by: NURSE ANESTHETIST, CERTIFIED REGISTERED

## 2024-10-11 PROCEDURE — C1769 GUIDE WIRE: HCPCS | Performed by: SPECIALIST

## 2024-10-11 PROCEDURE — 25000003 PHARM REV CODE 250: Performed by: SPECIALIST

## 2024-10-11 PROCEDURE — 63600175 PHARM REV CODE 636 W HCPCS: Performed by: NURSE ANESTHETIST, CERTIFIED REGISTERED

## 2024-10-11 PROCEDURE — 52332 CYSTOSCOPY AND TREATMENT: CPT | Mod: LT,,, | Performed by: SPECIALIST

## 2024-10-11 PROCEDURE — 36415 COLL VENOUS BLD VENIPUNCTURE: CPT | Performed by: PHYSICIAN ASSISTANT

## 2024-10-11 DEVICE — STENT URETERAL UNIV 6FR 28CM: Type: IMPLANTABLE DEVICE | Site: URETER | Status: FUNCTIONAL

## 2024-10-11 RX ORDER — DEXAMETHASONE SODIUM PHOSPHATE 4 MG/ML
INJECTION, SOLUTION INTRA-ARTICULAR; INTRALESIONAL; INTRAMUSCULAR; INTRAVENOUS; SOFT TISSUE
Status: DISCONTINUED | OUTPATIENT
Start: 2024-10-11 | End: 2024-10-11

## 2024-10-11 RX ORDER — PROPOFOL 10 MG/ML
INJECTION, EMULSION INTRAVENOUS
Status: DISCONTINUED | OUTPATIENT
Start: 2024-10-11 | End: 2024-10-11

## 2024-10-11 RX ORDER — ATROPA BELLADONNA AND OPIUM 16.2; 6 MG/1; MG/1
SUPPOSITORY RECTAL
Status: DISCONTINUED | OUTPATIENT
Start: 2024-10-11 | End: 2024-10-11 | Stop reason: HOSPADM

## 2024-10-11 RX ORDER — LIDOCAINE HYDROCHLORIDE 20 MG/ML
INJECTION INTRAVENOUS
Status: DISCONTINUED | OUTPATIENT
Start: 2024-10-11 | End: 2024-10-11

## 2024-10-11 RX ORDER — TAMSULOSIN HYDROCHLORIDE 0.4 MG/1
0.4 CAPSULE ORAL DAILY
Qty: 15 CAPSULE | Refills: 0 | Status: SHIPPED | OUTPATIENT
Start: 2024-10-12 | End: 2025-10-12

## 2024-10-11 RX ORDER — FENTANYL CITRATE 50 UG/ML
INJECTION, SOLUTION INTRAMUSCULAR; INTRAVENOUS
Status: DISCONTINUED | OUTPATIENT
Start: 2024-10-11 | End: 2024-10-11

## 2024-10-11 RX ORDER — LORAZEPAM 1 MG/1
1 TABLET ORAL ONCE
Status: COMPLETED | OUTPATIENT
Start: 2024-10-11 | End: 2024-10-11

## 2024-10-11 RX ORDER — CEFAZOLIN 2 G/1
INJECTION, POWDER, FOR SOLUTION INTRAMUSCULAR; INTRAVENOUS
Status: DISCONTINUED | OUTPATIENT
Start: 2024-10-11 | End: 2024-10-11

## 2024-10-11 RX ORDER — MIDAZOLAM HYDROCHLORIDE 1 MG/ML
INJECTION INTRAMUSCULAR; INTRAVENOUS
Status: DISCONTINUED | OUTPATIENT
Start: 2024-10-11 | End: 2024-10-11

## 2024-10-11 RX ORDER — HYDROCODONE BITARTRATE AND ACETAMINOPHEN 5; 325 MG/1; MG/1
1 TABLET ORAL EVERY 8 HOURS PRN
Qty: 6 TABLET | Refills: 0 | Status: SHIPPED | OUTPATIENT
Start: 2024-10-11

## 2024-10-11 RX ORDER — ONDANSETRON HYDROCHLORIDE 2 MG/ML
INJECTION, SOLUTION INTRAVENOUS
Status: DISCONTINUED | OUTPATIENT
Start: 2024-10-11 | End: 2024-10-11

## 2024-10-11 RX ADMIN — LIDOCAINE HYDROCHLORIDE 80 MG: 20 INJECTION, SOLUTION INTRAVENOUS at 08:10

## 2024-10-11 RX ADMIN — DEXAMETHASONE SODIUM PHOSPHATE 8 MG: 4 INJECTION, SOLUTION INTRAMUSCULAR; INTRAVENOUS at 08:10

## 2024-10-11 RX ADMIN — MIDAZOLAM HYDROCHLORIDE 2 MG: 1 INJECTION, SOLUTION INTRAMUSCULAR; INTRAVENOUS at 08:10

## 2024-10-11 RX ADMIN — ONDANSETRON 8 MG: 2 INJECTION, SOLUTION INTRAMUSCULAR; INTRAVENOUS at 08:10

## 2024-10-11 RX ADMIN — FENTANYL CITRATE 100 MCG: 50 INJECTION, SOLUTION INTRAMUSCULAR; INTRAVENOUS at 08:10

## 2024-10-11 RX ADMIN — HYDROMORPHONE HYDROCHLORIDE 1 MG: 1 INJECTION, SOLUTION INTRAMUSCULAR; INTRAVENOUS; SUBCUTANEOUS at 01:10

## 2024-10-11 RX ADMIN — TAMSULOSIN HYDROCHLORIDE 0.4 MG: 0.4 CAPSULE ORAL at 10:10

## 2024-10-11 RX ADMIN — FENTANYL CITRATE 50 MCG: 50 INJECTION, SOLUTION INTRAMUSCULAR; INTRAVENOUS at 08:10

## 2024-10-11 RX ADMIN — LORAZEPAM 1 MG: 1 TABLET ORAL at 12:10

## 2024-10-11 RX ADMIN — HYDROMORPHONE HYDROCHLORIDE 1 MG: 1 INJECTION, SOLUTION INTRAMUSCULAR; INTRAVENOUS; SUBCUTANEOUS at 04:10

## 2024-10-11 RX ADMIN — PROPOFOL 200 MG: 10 INJECTION, EMULSION INTRAVENOUS at 08:10

## 2024-10-11 RX ADMIN — CEFAZOLIN 2 G: 2 INJECTION, POWDER, FOR SOLUTION INTRAMUSCULAR; INTRAVENOUS at 08:10

## 2024-10-11 RX ADMIN — PANTOPRAZOLE SODIUM 40 MG: 40 TABLET, DELAYED RELEASE ORAL at 10:10

## 2024-10-11 NOTE — PROGRESS NOTES
[facility] ([facility])  [facility] Medicine  Progress Note    Patient Name: Emmanuel Ervin Jr.  MRN: [id number]  Patient Class: OP- Observation   Admission Date: 10/9/2024  Length of Stay: 0 days  Attending Physician: Eva Canela MD  Primary Care Provider: Rigo Rico NP        Subjective:     Principal Problem:PEDRO (acute kidney injury)        HPI:  Patient is a 34 year old male with medical history of bipolar depression, insomnia, HTN and nephrolithiasis who presented to the ED with sharp abdominal pain in the left lower quadrant/flank area.  He has had nausea but denies fever and vomiting.  He has h/o nephrolithiasis with multiple lithotripsy and has stent placement.  He decided to come to the ED due to uncontrollable pain.        Admitted for PEDRO secondary to ureterolithiasis.      Overview/Hospital Course:  PT HD stable on room air.  Renal function improving.  Pt went to OR today for left ureter stent.  F/u outpatient urology for lithotripsy.  Repeat labs Monday.      Past Medical History:   Diagnosis Date    Hypertension     Kidney stones     UTI (urinary tract infection)     diagnosed on 4/16/16       Past Surgical History:   Procedure Laterality Date    EXTRACORPOREAL SHOCK WAVE LITHOTRIPSY  2016    multiple since 2009    SKIN GRAFT Right     s/p traumatic injury to foot at 1 y/o    TOE AMPUTATION Right     due to  accident at 1 y/o       Review of patient's allergies indicates:  No Known Allergies    No current facility-administered medications on file prior to encounter.     Current Outpatient Medications on File Prior to Encounter   Medication Sig    ALPRAZolam (XANAX) 0.5 MG tablet alprazolam 0.5 mg tablet   TAKE 1 TABLET BY MOUTH EVERY DAY AS NEEDED    pantoprazole (PROTONIX) 40 MG tablet Take 40 mg by mouth once daily.    quetiapine (SEROQUEL) 300 MG Tab Take by mouth.    ibuprofen (ADVIL,MOTRIN) 800 MG tablet Take 1 tablet (800 mg total) by mouth every 6 (six) hours as needed for  Pain.    LIDOcaine (LIDODERM) 5 % Place 1 patch onto the skin once daily. Remove & Discard patch within 12 hours or as directed by MD (Patient not taking: Reported on 3/16/2022)    methylPREDNISolone (MEDROL DOSEPACK) 4 mg tablet use as directed    oxyCODONE-acetaminophen (PERCOCET) 5-325 mg per tablet Take 1 tablet by mouth every 6 (six) hours as needed for Pain.     Family History       Problem Relation (Age of Onset)    Diabetes Maternal Aunt, Maternal Grandmother    Heart disease Maternal Aunt, Maternal Grandmother    Hypertension Maternal Grandmother          Tobacco Use    Smoking status: Never    Smokeless tobacco: Never   Substance and Sexual Activity    Alcohol use: No    Drug use: No    Sexual activity: Yes     Partners: Female     Review of Systems   Constitutional:  Negative for chills and fever.   HENT:  Negative for ear discharge and ear pain.    Eyes:  Negative for pain and discharge.   Respiratory:  Negative for cough and shortness of breath.    Cardiovascular:  Negative for chest pain and leg swelling.   Gastrointestinal:  Negative for abdominal pain, nausea and vomiting.   Endocrine: Negative for cold intolerance and heat intolerance.   Genitourinary:  Negative for difficulty urinating and dysuria.   Musculoskeletal:  Negative for joint swelling and myalgias.   Skin:  Negative for rash and wound.   Neurological:  Negative for dizziness and headaches.   Psychiatric/Behavioral:  Negative for agitation and confusion.      Objective:     Vital Signs (Most Recent):  Temp: 97.9 °F (36.6 °C) (10/11/24 0922)  Pulse: 103 (10/11/24 1005)  Resp: 18 (10/11/24 0922)  BP: (!) 143/83 (10/11/24 0922)  SpO2: (!) 92 % (10/11/24 0922) Vital Signs (24h Range):  Temp:  [97.9 °F (36.6 °C)-98.9 °F (37.2 °C)] 97.9 °F (36.6 °C)  Pulse:  [] 103  Resp:  [15-20] 18  SpO2:  [91 %-97 %] 92 %  BP: (137-179)/() 143/83     Weight: 125.9 kg (277 lb 9 oz)  Body mass index is 38.71 kg/m².     Physical  "Exam  Constitutional:       General: He is not in acute distress.  HENT:      Head: Normocephalic and atraumatic.   Eyes:      General:         Right eye: No discharge.         Left eye: No discharge.   Cardiovascular:      Rate and Rhythm: Normal rate and regular rhythm.   Pulmonary:      Effort: Pulmonary effort is normal.      Breath sounds: Normal breath sounds.   Abdominal:      General: There is no distension.      Tenderness: There is no abdominal tenderness.   Musculoskeletal:         General: No swelling or tenderness.      Cervical back: Neck supple. No tenderness.   Skin:     General: Skin is warm and dry.   Neurological:      General: No focal deficit present.      Mental Status: He is alert and oriented to person, place, and time.   Psychiatric:         Mood and Affect: Mood normal.         Behavior: Behavior normal.                Significant Labs: A1C: No results for input(s): "HGBA1C" in the last 4320 hours.  ABGs: No results for input(s): "PH", "PCO2", "HCO3", "POCSATURATED", "BE", "TOTALHB", "COHB", "METHB", "O2HB", "POCFIO2", "PO2" in the last 48 hours.  Bilirubin:   Recent Labs   Lab 10/09/24  2112 10/10/24  0440   BILITOT 0.8 1.2*     Blood Culture: No results for input(s): "LABBLOO" in the last 48 hours.  BMP:   Recent Labs   Lab 10/11/24  0423   *      K 4.1      CO2 20*   BUN 15   CREATININE 1.6*   CALCIUM 9.5   MG 1.9     CBC:   Recent Labs   Lab 10/09/24  2112 10/10/24  0440 10/11/24  0423   WBC 14.90* 11.01 12.71*   HGB 14.0 13.2* 13.9*   HCT 41.1 39.8* 41.5    201 181     CMP:   Recent Labs   Lab 10/09/24  2112 10/09/24  2315 10/10/24  0440 10/11/24  0423    142 141 137   K 3.8 3.7 3.9 4.1    110 108 105   CO2 21* 20* 23 20*   GLU 97 88 97 113*   BUN 19 19 20 15   CREATININE 1.9* 1.8* 1.9* 1.6*   CALCIUM 9.4 8.6* 8.6* 9.5   PROT 7.0  --  6.4  --    ALBUMIN 4.0  --  3.6  --    BILITOT 0.8  --  1.2*  --    ALKPHOS 85  --  78  --    AST 44*  --  39  --  " "  ALT 53*  --  46*  --    ANIONGAP 14 12 10 12     Cardiac Markers: No results for input(s): "CKMB", "MYOGLOBIN", "BNP", "TROPISTAT" in the last 48 hours.  Coagulation: No results for input(s): "PT", "INR", "APTT" in the last 48 hours.  Lactic Acid: No results for input(s): "LACTATE" in the last 48 hours.  Lipase:   Recent Labs   Lab 10/09/24  2112   LIPASE 30     Lipid Panel: No results for input(s): "CHOL", "HDL", "LDLCALC", "TRIG", "CHOLHDL" in the last 48 hours.  Magnesium:   Recent Labs   Lab 10/11/24  0423   MG 1.9     POCT Glucose: No results for input(s): "POCTGLUCOSE" in the last 48 hours.  Prealbumin: No results for input(s): "PREALBUMIN" in the last 48 hours.  Respiratory Culture: No results for input(s): "GSRESP", "RESPIRATORYC" in the last 48 hours.  Troponin: No results for input(s): "TROPONINI", "TROPONINIHS" in the last 48 hours.  TSH: No results for input(s): "TSH" in the last 4320 hours.  Urine Culture: No results for input(s): "LABURIN" in the last 48 hours.  Urine Studies:   Recent Labs   Lab 10/09/24  2043   COLORU Yellow   APPEARANCEUA Hazy*   PHUR 6.0   SPECGRAV 1.015   PROTEINUA Negative   GLUCUA Negative   KETONESU Negative   BILIRUBINUA Negative   OCCULTUA 3+*   NITRITE Negative   UROBILINOGEN Negative   LEUKOCYTESUR Trace*   RBCUA >100*   WBCUA 5   BACTERIA Rare   SQUAMEPITHEL 2       Significant Imaging: I have reviewed all pertinent imaging results/findings within the past 24 hours.    Assessment/Plan:      * PEDRO (acute kidney injury)  PEDRO is likely due to post-obstructive d/t ureterolithiasis . Baseline creatinine is  1.2 . Most recent creatinine and eGFR are listed below.  Recent Labs     10/09/24  2315 10/10/24  0440 10/11/24  0423   CREATININE 1.8* 1.9* 1.6*   EGFRNORACEVR 49* 46* 57*        Plan  - PEDRO is stable  - Avoid nephrotoxins and renally dose meds for GFR listed above  - Monitor urine output, serial BMP, and adjust therapy as needed    Slowly improving. Ureter stent placed.  " Repeat labs 10/14      Essential hypertension  Patients blood pressure range in the last 24 hours was: BP  Min: 125/72  Max: 179/101.The patient's inpatient anti-hypertensive regimen is listed below:  Current Antihypertensives  hydrALAZINE injection 10 mg, Every 6 hours PRN, Intravenous    Plan  Hold losartan in the setting of PEDRO     Insomnia  Resume home seroquel       Hydroureteronephrosis  Moderate left hydroureteronephrosis seen on CT Renal stone study         Ureterolithiasis  Bilateral   Obstructing left 5.8mm calculus in the distal left ureter.  Multiple nonobstructing right renal calculi   Urology consulted  and NPO at midnight  Pain control   Flomax added     Ureter stent placed and will follow up outpatient for lithotripsy with urology. 10/11      Bipolar depression  Resume home medications         VTE Risk Mitigation (From admission, onward)           Ordered     IP VTE HIGH RISK PATIENT  Once         10/10/24 0044     Place sequential compression device  Until discontinued         10/10/24 0044                    Discharge Planning   CATE:      Code Status: Full Code   Is the patient medically ready for discharge?:     Reason for patient still in hospital (select all that apply): Other (specify) discharge today  Discharge Plan A: Home                  Isabel Chirinos PA-C  Department of Hospital Medicine   [facility] (3rd Fl)

## 2024-10-11 NOTE — ANESTHESIA PREPROCEDURE EVALUATION
10/11/2024     Emmanuel Ervin Jr. is a 34 y.o., male   PRIOR ANES  2017-03-29 Cysto/Stent GA VSS w no pressor, NAAC  Many other urol p rocedures in epic; pt states NAAC    ANES-RELATED MED/SURG  HTN  Past Medical History:   Diagnosis Date    Hypertension     Kidney stones     UTI (urinary tract infection)     diagnosed on 4/16/16       Past Surgical History:   Procedure Laterality Date    EXTRACORPOREAL SHOCK WAVE LITHOTRIPSY  2016    multiple since 2009    SKIN GRAFT Right     s/p traumatic injury to foot at 1 y/o    TOE AMPUTATION Right     due to  accident at 1 y/o         Anesthesia Evaluation    I have reviewed the Patient Summary Reports.     I have reviewed the Nursing Notes. I have reviewed the NPO Status.   I have reviewed the Medications.     Review of Systems  Anesthesia Hx:   History of prior surgery of interest to airway management or planning:  Previous anesthesia: General, MAC         Procedure performed at an [facility]. Airway issues documented on chart review include mask, easy, easy direct laryngoscopy , view on direct laryngoscopy Grade I     Personal Hx of Anesthesia complications                   Unsatisfactory Post-Operative Pain Control (pt reports significant pain after last procedure 5/2016)  Social:  Non-Smoker, No Alcohol Use       Hematology/Oncology:  Hematology Normal                                     EENT/Dental:  EENT/Dental Normal           Cardiovascular:  Exercise tolerance: good   Hypertension, well controlled     Denies Dysrhythmias.   Denies Angina.       Denies GÓMEZ.                            Pulmonary:       Denies Shortness of breath.                  Renal/:   renal calculi  Denies hematuria or dysuria, denies pain             Hepatic/GI:     GERD, well controlled             Neurological:  Neurology Normal                                       Endocrine:  Endocrine Normal            Psych:   anxiety depression                  Physical Exam  General:  Well nourished and Obesity       Airway/Jaw/Neck:  AIRWAY FINDINGS: Normal             Mallampati: II  Improves to I with phonation.                Eyes/Ears/Nose:  EYES/EARS/NOSE FINDINGS: Normal    Dental:  Dental Findings: In tact, Periodontal disease, Mild      Chest/Lungs:  Chest/Lungs Clear      Heart/Vascular:  Heart Findings: Normal    Heart murmur: negative                  Abdomen:  Abdomen Findings: Normal        Mental Status:  Mental Status Findings: Normal         Anesthesia Plan  Type of Anesthesia, risks & benefits discussed:  Anesthesia Type:  general    Patient's Preference:   Plan Factors:          Intra-op Monitoring Plan: standard ASA monitors  Intra-op Monitoring Plan Comments:   Post Op Pain Control Plan: multimodal analgesia, IV/PO Opioids PRN and per primary service following discharge from PACU  Post Op Pain Control Plan Comments:     Induction:   IV  Beta Blocker:  Patient is not currently on a Beta-Blocker (No further documentation required).       Informed Consent: Informed consent signed with the Patient and all parties understand the risks and agree with anesthesia plan.  All questions answered.  Anesthesia consent signed with patient.  ASA Score: 2     Day of Surgery Review of History & Physical: I have interviewed and examined the patient. I have reviewed the patient's H&P dated: 10/11/24.  There are no significant changes.  Significant changes noted.      Anesthesia Plan Notes:           Ready For Surgery From Anesthesia Perspective.             Physical Exam  General: Well nourished and Obesity    Airway:  Mallampati: II / I    Dental:  In tact, Periodontal disease, Mild        Anesthesia Plan  Type of Anesthesia, risks & benefits discussed:    Anesthesia Type: general  Intra-op Monitoring Plan: standard ASA monitors  Post Op Pain Control Plan: multimodal analgesia, IV/PO  Opioids PRN and per primary service following discharge from PACU  Induction:  IV  Informed Consent: Informed consent signed with the Patient and all parties understand the risks and agree with anesthesia plan.  All questions answered.   ASA Score: 2  Day of Surgery Review of History & Physical: I have interviewed and examined the patient. I have reviewed the patient's H&P dated: 10/11/24.  Significant changes noted.   Anesthesia Plan Notes:       Ready For Surgery From Anesthesia Perspective.     .

## 2024-10-11 NOTE — TRANSFER OF CARE
"Anesthesia Transfer of Care Note    Patient: Emmanuel Ervin Jr.    Procedure(s) Performed: Procedure(s) (LRB):  CYSTOSCOPY, WITH URETERAL STENT INSERTION (Left)    Patient location: PACU    Anesthesia Type: general    Transport from OR: Transported from OR on 6-10 L/min O2 by face mask with adequate spontaneous ventilation    Post pain: adequate analgesia    Post assessment: no apparent anesthetic complications and tolerated procedure well    Post vital signs: stable    Level of consciousness: sedated    Nausea/Vomiting: no nausea/vomiting    Complications: none    Transfer of care protocol was followed      Last vitals: Visit Vitals  /61   Pulse 105   Temp 36.7 °C (98.1 °F) (Oral)   Resp 18   Ht 5' 11" (1.803 m)   Wt 125.9 kg (277 lb 9 oz)   SpO2 (!) 91%   BMI 38.71 kg/m²     "

## 2024-10-11 NOTE — ASSESSMENT & PLAN NOTE
Bilateral   Obstructing left 5.8mm calculus in the distal left ureter.  Multiple nonobstructing right renal calculi   Urology consulted  and NPO at midnight  Pain control   Flomax added     Ureter stent placed and will follow up outpatient for lithotripsy with urology. 10/11

## 2024-10-11 NOTE — HOSPITAL COURSE
PT HD stable on room air.  Renal function improving.  Pt went to OR today for left ureter stent.  F/u outpatient urology for lithotripsy.  Repeat labs Monday.

## 2024-10-11 NOTE — SUBJECTIVE & OBJECTIVE
Past Medical History:   Diagnosis Date    Hypertension     Kidney stones     UTI (urinary tract infection)     diagnosed on 4/16/16       Past Surgical History:   Procedure Laterality Date    EXTRACORPOREAL SHOCK WAVE LITHOTRIPSY  2016    multiple since 2009    SKIN GRAFT Right     s/p traumatic injury to foot at 1 y/o    TOE AMPUTATION Right     due to  accident at 1 y/o       Review of patient's allergies indicates:  No Known Allergies    No current facility-administered medications on file prior to encounter.     Current Outpatient Medications on File Prior to Encounter   Medication Sig    ALPRAZolam (XANAX) 0.5 MG tablet alprazolam 0.5 mg tablet   TAKE 1 TABLET BY MOUTH EVERY DAY AS NEEDED    pantoprazole (PROTONIX) 40 MG tablet Take 40 mg by mouth once daily.    quetiapine (SEROQUEL) 300 MG Tab Take by mouth.    ibuprofen (ADVIL,MOTRIN) 800 MG tablet Take 1 tablet (800 mg total) by mouth every 6 (six) hours as needed for Pain.    LIDOcaine (LIDODERM) 5 % Place 1 patch onto the skin once daily. Remove & Discard patch within 12 hours or as directed by MD (Patient not taking: Reported on 3/16/2022)    methylPREDNISolone (MEDROL DOSEPACK) 4 mg tablet use as directed    oxyCODONE-acetaminophen (PERCOCET) 5-325 mg per tablet Take 1 tablet by mouth every 6 (six) hours as needed for Pain.     Family History       Problem Relation (Age of Onset)    Diabetes Maternal Aunt, Maternal Grandmother    Heart disease Maternal Aunt, Maternal Grandmother    Hypertension Maternal Grandmother          Tobacco Use    Smoking status: Never    Smokeless tobacco: Never   Substance and Sexual Activity    Alcohol use: No    Drug use: No    Sexual activity: Yes     Partners: Female     Review of Systems   Constitutional:  Negative for chills and fever.   HENT:  Negative for ear discharge and ear pain.    Eyes:  Negative for pain and discharge.   Respiratory:  Negative for cough and shortness of breath.    Cardiovascular:   "Negative for chest pain and leg swelling.   Gastrointestinal:  Negative for abdominal pain, nausea and vomiting.   Endocrine: Negative for cold intolerance and heat intolerance.   Genitourinary:  Negative for difficulty urinating and dysuria.   Musculoskeletal:  Negative for joint swelling and myalgias.   Skin:  Negative for rash and wound.   Neurological:  Negative for dizziness and headaches.   Psychiatric/Behavioral:  Negative for agitation and confusion.      Objective:     Vital Signs (Most Recent):  Temp: 97.9 °F (36.6 °C) (10/11/24 0922)  Pulse: 103 (10/11/24 1005)  Resp: 18 (10/11/24 0922)  BP: (!) 143/83 (10/11/24 0922)  SpO2: (!) 92 % (10/11/24 0922) Vital Signs (24h Range):  Temp:  [97.9 °F (36.6 °C)-98.9 °F (37.2 °C)] 97.9 °F (36.6 °C)  Pulse:  [] 103  Resp:  [15-20] 18  SpO2:  [91 %-97 %] 92 %  BP: (137-179)/() 143/83     Weight: 125.9 kg (277 lb 9 oz)  Body mass index is 38.71 kg/m².     Physical Exam  Constitutional:       General: He is not in acute distress.  HENT:      Head: Normocephalic and atraumatic.   Eyes:      General:         Right eye: No discharge.         Left eye: No discharge.   Cardiovascular:      Rate and Rhythm: Normal rate and regular rhythm.   Pulmonary:      Effort: Pulmonary effort is normal.      Breath sounds: Normal breath sounds.   Abdominal:      General: There is no distension.      Tenderness: There is no abdominal tenderness.   Musculoskeletal:         General: No swelling or tenderness.      Cervical back: Neck supple. No tenderness.   Skin:     General: Skin is warm and dry.   Neurological:      General: No focal deficit present.      Mental Status: He is alert and oriented to person, place, and time.   Psychiatric:         Mood and Affect: Mood normal.         Behavior: Behavior normal.                Significant Labs: A1C: No results for input(s): "HGBA1C" in the last 4320 hours.  ABGs: No results for input(s): "PH", "PCO2", "HCO3", "POCSATURATED", "BE", " ""TOTALHB", "COHB", "METHB", "O2HB", "POCFIO2", "PO2" in the last 48 hours.  Bilirubin:   Recent Labs   Lab 10/09/24  2112 10/10/24  0440   BILITOT 0.8 1.2*     Blood Culture: No results for input(s): "LABBLOO" in the last 48 hours.  BMP:   Recent Labs   Lab 10/11/24  0423   *      K 4.1      CO2 20*   BUN 15   CREATININE 1.6*   CALCIUM 9.5   MG 1.9     CBC:   Recent Labs   Lab 10/09/24  2112 10/10/24  0440 10/11/24  0423   WBC 14.90* 11.01 12.71*   HGB 14.0 13.2* 13.9*   HCT 41.1 39.8* 41.5    201 181     CMP:   Recent Labs   Lab 10/09/24  2112 10/09/24  2315 10/10/24  0440 10/11/24  0423    142 141 137   K 3.8 3.7 3.9 4.1    110 108 105   CO2 21* 20* 23 20*   GLU 97 88 97 113*   BUN 19 19 20 15   CREATININE 1.9* 1.8* 1.9* 1.6*   CALCIUM 9.4 8.6* 8.6* 9.5   PROT 7.0  --  6.4  --    ALBUMIN 4.0  --  3.6  --    BILITOT 0.8  --  1.2*  --    ALKPHOS 85  --  78  --    AST 44*  --  39  --    ALT 53*  --  46*  --    ANIONGAP 14 12 10 12     Cardiac Markers: No results for input(s): "CKMB", "MYOGLOBIN", "BNP", "TROPISTAT" in the last 48 hours.  Coagulation: No results for input(s): "PT", "INR", "APTT" in the last 48 hours.  Lactic Acid: No results for input(s): "LACTATE" in the last 48 hours.  Lipase:   Recent Labs   Lab 10/09/24  2112   LIPASE 30     Lipid Panel: No results for input(s): "CHOL", "HDL", "LDLCALC", "TRIG", "CHOLHDL" in the last 48 hours.  Magnesium:   Recent Labs   Lab 10/11/24  0423   MG 1.9     POCT Glucose: No results for input(s): "POCTGLUCOSE" in the last 48 hours.  Prealbumin: No results for input(s): "PREALBUMIN" in the last 48 hours.  Respiratory Culture: No results for input(s): "GSRESP", "RESPIRATORYC" in the last 48 hours.  Troponin: No results for input(s): "TROPONINI", "TROPONINIHS" in the last 48 hours.  TSH: No results for input(s): "TSH" in the last 4320 hours.  Urine Culture: No results for input(s): "LABURIN" in the last 48 hours.  Urine Studies: "   Recent Labs   Lab 10/09/24  2043   COLORU Yellow   APPEARANCEUA Hazy*   PHUR 6.0   SPECGRAV 1.015   PROTEINUA Negative   GLUCUA Negative   KETONESU Negative   BILIRUBINUA Negative   OCCULTUA 3+*   NITRITE Negative   UROBILINOGEN Negative   LEUKOCYTESUR Trace*   RBCUA >100*   WBCUA 5   BACTERIA Rare   SQUAMEPITHEL 2       Significant Imaging: I have reviewed all pertinent imaging results/findings within the past 24 hours.

## 2024-10-11 NOTE — PLAN OF CARE
Problem: Adult Inpatient Plan of Care  Goal: Plan of Care Review  Outcome: Progressing  Flowsheets (Taken 10/11/2024 0502)  Plan of Care Reviewed With: patient  Goal: Patient-Specific Goal (Individualized)  Outcome: Progressing  Goal: Absence of Hospital-Acquired Illness or Injury  Outcome: Progressing  Goal: Optimal Comfort and Wellbeing  Outcome: Progressing  Goal: Readiness for Transition of Care  Outcome: Progressing     Problem: Acute Kidney Injury/Impairment  Goal: Fluid and Electrolyte Balance  Outcome: Progressing  Goal: Improved Oral Intake  Outcome: Progressing  Goal: Effective Renal Function  Outcome: Progressing     Problem: Pain Acute  Goal: Optimal Pain Control and Function  Outcome: Progressing

## 2024-10-11 NOTE — ASSESSMENT & PLAN NOTE
PEDRO is likely due to post-obstructive d/t ureterolithiasis . Baseline creatinine is  1.2 . Most recent creatinine and eGFR are listed below.  Recent Labs     10/09/24  2315 10/10/24  0440 10/11/24  0423   CREATININE 1.8* 1.9* 1.6*   EGFRNORACEVR 49* 46* 57*        Plan  - PEDRO is stable  - Avoid nephrotoxins and renally dose meds for GFR listed above  - Monitor urine output, serial BMP, and adjust therapy as needed    Slowly improving. Ureter stent placed.  Repeat labs 10/14

## 2024-10-11 NOTE — PLAN OF CARE
Problem: Adult Inpatient Plan of Care  Goal: Patient-Specific Goal (Individualized)  Outcome: Adequate for Care Transition  Goal: Absence of Hospital-Acquired Illness or Injury  Outcome: Adequate for Care Transition  Goal: Optimal Comfort and Wellbeing  Outcome: Adequate for Care Transition  Goal: Readiness for Transition of Care  Outcome: Adequate for Care Transition     Problem: Acute Kidney Injury/Impairment  Goal: Fluid and Electrolyte Balance  Outcome: Adequate for Care Transition  Goal: Improved Oral Intake  Outcome: Adequate for Care Transition  Goal: Effective Renal Function  Outcome: Adequate for Care Transition     Problem: Pain Acute  Goal: Optimal Pain Control and Function  Outcome: Adequate for Care Transition     Problem: Fall Injury Risk  Goal: Absence of Fall and Fall-Related Injury  Outcome: Adequate for Care Transition     Pt had a urinary stent placed without complications. Pt able to urinate and urine was blood tinged. Pt stable and no distress noted on day of care.

## 2024-10-11 NOTE — ASSESSMENT & PLAN NOTE
Patients blood pressure range in the last 24 hours was: BP  Min: 125/72  Max: 179/101.The patient's inpatient anti-hypertensive regimen is listed below:  Current Antihypertensives  hydrALAZINE injection 10 mg, Every 6 hours PRN, Intravenous    Plan  Hold losartan in the setting of PEDRO

## 2024-10-11 NOTE — DISCHARGE SUMMARY
[facility] ([facility])  [facility]  Discharge Summary      Patient Name: Emmanuel Ervin Jr.  MRN: [id number]  CÉSAR: [id number]  Patient Class: OP- Observation  Admission Date: 10/9/2024  Hospital Length of Stay: 0 days  Discharge Date and Time:  10/11/2024 11:30 AM  Attending Physician: Eva Canela MD   Discharging Provider: Isabel Chirinos PA-C  Primary Care Provider: Rigo Rico NP    Primary Care Team: Networked reference to record PCT     HPI:   Patient is a 34 year old male with medical history of bipolar depression, insomnia, HTN and nephrolithiasis who presented to the ED with sharp abdominal pain in the left lower quadrant/flank area.  He has had nausea but denies fever and vomiting.  He has h/o nephrolithiasis with multiple lithotripsy and has stent placement.  He decided to come to the ED due to uncontrollable pain.        Admitted for PEDRO secondary to ureterolithiasis.      Procedure(s) (LRB):  CYSTOSCOPY, WITH URETERAL STENT INSERTION (Left)      Hospital Course:   PT HD stable on room air.  Renal function improving.  Pt went to OR today for left ureter stent.  F/u outpatient urology for lithotripsy.  Repeat labs Monday.       Goals of Care Treatment Preferences:  Code Status: Full Code      SDOH Screening:  The patient was screened for utility difficulties, food insecurity, transport difficulties, housing insecurity, and interpersonal safety and there were no concerns identified this admission.     Consults:   Consults (From admission, onward)          Status Ordering Provider     Inpatient consult to Urology  Once        Provider:  Cuco Paulson MD    Acknowledged TAYLOR HERNANDEZ            Cardiac/Vascular  Essential hypertension  Patients blood pressure range in the last 24 hours was: BP  Min: 125/72  Max: 179/101.The patient's inpatient anti-hypertensive regimen is listed below:  Current Antihypertensives  hydrALAZINE injection 10 mg, Every 6 hours PRN,  Intravenous    Plan  Hold losartan in the setting of PEDRO     Renal/  * PEDRO (acute kidney injury)  PEDRO is likely due to post-obstructive d/t ureterolithiasis . Baseline creatinine is  1.2 . Most recent creatinine and eGFR are listed below.  Recent Labs     10/09/24  2315 10/10/24  0440 10/11/24  0423   CREATININE 1.8* 1.9* 1.6*   EGFRNORACEVR 49* 46* 57*        Plan  - PEDRO is stable  - Avoid nephrotoxins and renally dose meds for GFR listed above  - Monitor urine output, serial BMP, and adjust therapy as needed    Slowly improving. Ureter stent placed.  Repeat labs 10/14      Hydroureteronephrosis  Moderate left hydroureteronephrosis seen on CT Renal stone study         Ureterolithiasis  Bilateral   Obstructing left 5.8mm calculus in the distal left ureter.  Multiple nonobstructing right renal calculi   Urology consulted  and NPO at midnight  Pain control   Flomax added     Ureter stent placed and will follow up outpatient for lithotripsy with urology. 10/11      Other  Insomnia  Resume home seroquel       Bipolar depression  Resume home medications         Final Active Diagnoses:    Diagnosis Date Noted POA    PRINCIPAL PROBLEM:  PEDRO (acute kidney injury) [N17.9] 10/10/2024 Yes    Bipolar depression [F31.9] 10/10/2024 Yes    Ureterolithiasis [N20.1] 10/10/2024 Yes    Hydroureteronephrosis [N13.30] 10/10/2024 Yes    Insomnia [G47.00] 10/10/2024 Yes    Essential hypertension [I10] 10/10/2024 Yes      Problems Resolved During this Admission:       Discharged Condition: good    Disposition: Home or Self Care    Follow Up:   Follow-up Information       Rigo Rico NP Follow up in 1 week(s).    Specialty: Family Medicine  Contact information:  [address] [place] [address]  3RD FLOOR  [facility]  [place] [id number] [phone]                           Patient Instructions:   No discharge procedures on file.    Significant Diagnostic Studies: see A&P     Pending Diagnostic Studies:       Procedure Component Value Units  Date/Time    FL Michael Usage [[id number]] Resulted: 10/11/24 0753    Order Status: Sent Lab Status: In process Updated: 10/11/24 0855           Medications:  Reconciled Home Medications:      Medication List        START taking these medications      HYDROcodone-acetaminophen 5-325 mg per tablet  Commonly known as: NORCO  Take 1 tablet by mouth every 8 (eight) hours as needed for Pain.     tamsulosin 0.4 mg Cap  Commonly known as: FLOMAX  Take 1 capsule (0.4 mg total) by mouth once daily.  Start taking on: October 12, 2024            CONTINUE taking these medications      ALPRAZolam 0.5 MG tablet  Commonly known as: XANAX  alprazolam 0.5 mg tablet   TAKE 1 TABLET BY MOUTH EVERY DAY AS NEEDED     pantoprazole 40 MG tablet  Commonly known as: PROTONIX  Take 40 mg by mouth once daily.     QUEtiapine 300 MG Tab  Commonly known as: SEROQUEL  Take by mouth.            STOP taking these medications      ibuprofen 800 MG tablet  Commonly known as: ADVIL,MOTRIN     LIDOcaine 5 %  Commonly known as: LIDODERM     methylPREDNISolone 4 mg tablet  Commonly known as: MEDROL DOSEPACK     oxyCODONE-acetaminophen 5-325 mg per tablet  Commonly known as: PERCOCET              Indwelling Lines/Drains at time of discharge:   Lines/Drains/Airways       None                   Time spent on the discharge of patient: 25 minutes         Isabel Chirinos PA-C  Department of Hospital Medicine  [place] [facility] Surg (3rd Fl)

## 2024-10-11 NOTE — ANESTHESIA PROCEDURE NOTES
Intubation    Date/Time: 10/11/2024 8:13 AM    Performed by: Tye Estes CRNA  Authorized by: Tye Estes CRNA    Intubation:     Induction:  Intravenous    Intubated:  Postinduction    Mask Ventilation:  N/a    Attempts:  1    Attempted By:  CRNA    Difficult Airway Encountered?: No      Complications:  None    Airway Device:  Supraglottic airway/LMA    Airway Device Size:  5.0    Style/Cuff Inflation:  Cuffed (inflated to minimal occlusive pressure)    Secured at:  The lips    Placement Verified By:  Capnometry    Complicating Factors:  None    Findings Post-Intubation:  BS equal bilateral and atraumatic/condition of teeth unchanged

## 2024-10-11 NOTE — PROGRESS NOTES
Pt back in room 309. Vitals stable, afebrile. No complaints of pain. Pt voiding well just some urgency at this time. Urine is blood tinge.

## 2024-10-11 NOTE — TELEPHONE ENCOUNTER
Ava,  Patient had left JJ stent placed today, He will need a follow up appointment with a KUB on 10/29/24, as I placed him on OR schedule for URS, laser lithotripsy on 10/30   Thx  MS

## 2024-10-11 NOTE — OP NOTE
[facility] Surg ([facility])  Surgery Department  Operative Note    SUMMARY     Date of Procedure: 10/11/2024     Procedure: Procedure(s) (LRB):  CYSTOSCOPY, WITH URETERAL STENT INSERTION (Left)     Surgeons and Role:     * Cuco Paulson MD - Primary    Assisting Surgeon: None    Pre-Operative Diagnosis: Renal calculus or stone [N20.0]    Post-Operative Diagnosis: Post-Op Diagnosis Codes:     * Renal calculus or stone [N20.0]    Anesthesia: General    Operative Findings (including complications, if any): no complications    Description of Technical Procedures: Patient is a 34-year-old male with a history of recurrent nephrolithiasis.  I encouraged him to proceed with conservative management.  Unfortunately, he had intractable pain.  We agreed to proceed with double-J stent placement.  I explained that we do not have a holmium laser on site and he will need to return to the operating room for definitive management of his stone in 1-2 weeks.  Patient was requiring large doses of morphine and Dilaudid and an indwelling double-J stent should alleviate his obstruction and curtail his pain.    Procedure:   Patient was taken to the operating room arena and given general anesthesia.  He received 2 g of Ancef.  Time-out was performed.  Left side was confirmed.  Patient was placed in a dorsal lithotomy position in his genitalia was prepped and draped in the usual sterile fashion.  Cystoscopic examination was performed.  There were no bladder tumors or stones.  Left ureteral orifice was cannulated with an 035 guidewire which was advanced up into the left collecting system under fluoroscopic guidance I could appreciate a 5-6 mm stone on fluoroscopy alongside the guidewire in the left distal ureter.  Over my guidewire I advanced a 28 cm double-J stent.  Coils were confirmed on both fluoroscopy and cystoscopy.  The bladder was drained patient tolerated the procedure well and was taken recovery room in stable condition.  Plan  is for laser lithotripsy in 1-2 weeks.    Significant Surgical Tasks Conducted by the Assistant(s), if Applicable: none    Estimated Blood Loss (EBL): * No values recorded between 10/11/2024  8:21 AM and 10/11/2024  8:39 AM *           Implants:   Implant Name Type Inv. Item Serial No.  Lot No. LRB No. Used Action   STENT URETERAL UNIV 6FR 28CM - [id number]  STENT URETERAL UNIV 6FR 28CM  [employer]  Left 1 Implanted       Specimens:   Specimen (24h ago, onward)      None                    Condition: Good    Disposition: PACU - hemodynamically stable.    Attestation: Op Note Attestation: I performed the procedure.

## 2024-10-14 ENCOUNTER — TELEPHONE (OUTPATIENT)
Dept: FAMILY MEDICINE | Facility: CLINIC | Age: 34
End: 2024-10-14
Payer: COMMERCIAL

## 2024-10-14 NOTE — TELEPHONE ENCOUNTER
Patient contacted and notified that I did reach out to Dr Paulson's MA to verify if he felt patient needed bloodwork. Will follow up with patient as soon as possible.

## 2024-10-14 NOTE — ANESTHESIA POSTPROCEDURE EVALUATION
Anesthesia Post Evaluation    Patient: Emmanuel Ervin Jr.    Procedure(s) Performed: Procedure(s) (LRB):  CYSTOSCOPY, WITH URETERAL STENT INSERTION (Left)    Final Anesthesia Type: general      Patient location during evaluation: PACU  Patient participation: Yes- Able to Participate  Level of consciousness: awake and alert and oriented  Post-procedure vital signs: reviewed and stable  Pain management: adequate  Airway patency: patent    PONV status at discharge: No PONV  Anesthetic complications: no      Cardiovascular status: blood pressure returned to baseline and hemodynamically stable  Respiratory status: unassisted, spontaneous ventilation and room air  Hydration status: euvolemic  Follow-up not needed.              Vitals Value Taken Time   /96 10/11/24 1137   Temp 36.5 °C (97.7 °F) 10/11/24 1137   Pulse 87 10/11/24 1359   Resp 18 10/11/24 1137   SpO2 95 % 10/11/24 1137         Event Time   Out of Recovery 10/11/2024 09:19:37         Pain/Emperatriz Score: No data recorded

## 2024-10-14 NOTE — TELEPHONE ENCOUNTER
----- Message from Mauricio sent at 10/14/2024  9:25 AM CDT -----  Contact: [facility]  MRN: [id number]  : 1990  PCP: Rigo Rico  Home Phone      Not on file.  Work Phone      Not on file.  Mobile          [phone]      MESSAGE:     [facility] called stating pt was seen by Dr. Canela in the hospital and was told to go have bloodwork today. They stated there isn't any orders in for lab work.          Please advise  [phone]

## 2024-10-15 ENCOUNTER — OFFICE VISIT (OUTPATIENT)
Dept: UROLOGY | Facility: CLINIC | Age: 34
End: 2024-10-15
Attending: SPECIALIST
Payer: COMMERCIAL

## 2024-10-15 VITALS
SYSTOLIC BLOOD PRESSURE: 126 MMHG | WEIGHT: 268 LBS | OXYGEN SATURATION: 97 % | HEART RATE: 94 BPM | HEIGHT: 71 IN | BODY MASS INDEX: 37.52 KG/M2 | DIASTOLIC BLOOD PRESSURE: 82 MMHG

## 2024-10-15 DIAGNOSIS — N20.1 LEFT URETERAL STONE: Primary | ICD-10-CM

## 2024-10-15 PROCEDURE — 1159F MED LIST DOCD IN RCRD: CPT | Mod: CPTII,S$GLB,, | Performed by: SPECIALIST

## 2024-10-15 PROCEDURE — 99999 PR PBB SHADOW E&M-EST. PATIENT-LVL IV: CPT | Mod: PBBFAC,,, | Performed by: SPECIALIST

## 2024-10-15 PROCEDURE — 3008F BODY MASS INDEX DOCD: CPT | Mod: CPTII,S$GLB,, | Performed by: SPECIALIST

## 2024-10-15 PROCEDURE — 99213 OFFICE O/P EST LOW 20 MIN: CPT | Mod: S$GLB,,, | Performed by: SPECIALIST

## 2024-10-15 PROCEDURE — 4010F ACE/ARB THERAPY RXD/TAKEN: CPT | Mod: CPTII,S$GLB,, | Performed by: SPECIALIST

## 2024-10-15 PROCEDURE — 3074F SYST BP LT 130 MM HG: CPT | Mod: CPTII,S$GLB,, | Performed by: SPECIALIST

## 2024-10-15 PROCEDURE — 3079F DIAST BP 80-89 MM HG: CPT | Mod: CPTII,S$GLB,, | Performed by: SPECIALIST

## 2024-10-15 RX ORDER — LAMOTRIGINE 200 MG/1
200 TABLET ORAL DAILY
COMMUNITY
Start: 2024-10-07

## 2024-10-15 RX ORDER — OXYBUTYNIN CHLORIDE 5 MG/1
5 TABLET, EXTENDED RELEASE ORAL 2 TIMES DAILY
Qty: 90 TABLET | Refills: 3 | Status: SHIPPED | OUTPATIENT
Start: 2024-10-15 | End: 2025-10-15

## 2024-10-15 RX ORDER — LOSARTAN POTASSIUM 50 MG/1
50 TABLET ORAL NIGHTLY
COMMUNITY
Start: 2024-10-07

## 2024-10-16 NOTE — PROGRESS NOTES
"[facility] - Urology   Clinic Note    SUBJECTIVE:     Chief Complaint   Patient presents with    Nephrolithiasis     ER follow up, stent placement. No present pain.        Referral from: No ref. provider found.    History of Present Illness:  Emmanuel Ervin Jr. is a 34 y.o. male who presents to clinic for left distal ureteral stone..    Other than occasional bladder spasms, he is tolerating his indwelling double-J stent reasonably well.  His pain is manageable.  He denies nausea vomiting fever or chills.  We discussed proceeding with ureteroscopy and laser lithotripsy.  Past Medical History:   Diagnosis Date    Hypertension     Kidney stones     UTI (urinary tract infection)     diagnosed on 4/16/16       Current Outpatient Medications on File Prior to Visit   Medication Sig Dispense Refill    ALPRAZolam (XANAX) 0.5 MG tablet alprazolam 0.5 mg tablet   TAKE 1 TABLET BY MOUTH EVERY DAY AS NEEDED      HYDROcodone-acetaminophen (NORCO) 5-325 mg per tablet Take 1 tablet by mouth every 8 (eight) hours as needed for Pain. 6 tablet 0    lamoTRIgine (LAMICTAL) 200 MG tablet Take 200 mg by mouth once daily.      losartan (COZAAR) 50 MG tablet Take 50 mg by mouth every evening.      pantoprazole (PROTONIX) 40 MG tablet Take 40 mg by mouth once daily.      quetiapine (SEROQUEL) 300 MG Tab Take by mouth.      tamsulosin (FLOMAX) 0.4 mg Cap Take 1 capsule (0.4 mg total) by mouth once daily. 15 capsule 0     No current facility-administered medications on file prior to visit.         OBJECTIVE:     Estimated body mass index is 37.37 kg/m² as calculated from the following:    Height as of this encounter: 5' 11" (1.803 m).    Weight as of this encounter: 121.6 kg (267 lb 15.5 oz).    Vital Signs (Most Recent)  Vitals:    10/15/24 1517   BP: 126/82   Pulse: 94       Physical Exam:    Physical Exam     GENERAL: patient sitting comfortably  HEENT: normocephalic  NECK: supple, no JVD  PULM: normal chest rise, no increased " "WOB  HEART: non-diaphoretic  ABDO: soft, nondistended, nontender  BACK: no CVA tenderness bilaterally  SKIN: warm, dry, well perfused  EXT: no bruising or edema  NEURO: grossly normal with no focal deficits  PSYCH: appropriate mood and affect    Genitourinary Exam:  deferred      LABS:     Lab Results   Component Value Date    BUN 15 10/11/2024    CREATININE 1.6 (H) 10/11/2024    WBC 12.71 (H) 10/11/2024    HGB 13.9 (L) 10/11/2024    HCT 41.5 10/11/2024     10/11/2024    AST 39 10/10/2024    ALT 46 (H) 10/10/2024    ALKPHOS 78 10/10/2024    ALBUMIN 3.6 10/10/2024    INR 1.0 01/26/2016        Urinalysis:   No results found for: "UAREFLEX"     PSA:  No results found for: "PSA", "PSADIAG", "PSATOTAL", "PSAFREE"    Testosterone:  No results found for: "TOTALTESTOST", "TESTOSTERONE"     Imaging:  I have personally reviewed all relevant imaging studies.    Results for orders placed or performed during the hospital encounter of 10/09/24 (from the past 2160 hours)   CT Renal Stone Study ABD Pelvis WO    Narrative    EXAMINATION:  CT RENAL STONE STUDY ABD PELVIS WO    CLINICAL HISTORY:  Flank pain, kidney stone suspected;    TECHNIQUE:  Low dose axial images, sagittal and coronal reformations were obtained from the lung bases to the pubic symphysis.  Contrast was not administered.    COMPARISON:  03/12/2022.    FINDINGS:  Heart: Normal in size. No pericardial effusion.    Lung Bases: Well aerated, without consolidation or pleural fluid.    Liver: Hepatomegaly with diffuse steatosis, similar to prior.    Gallbladder: No calcified gallstones.    Bile Ducts: No evidence of dilated ducts.    Pancreas: No mass or peripancreatic fat stranding.    Spleen: Unremarkable.    Adrenals: Unremarkable.    Kidneys/ Ureters: Multiple nonobstructing right renal calculi with interval increased stone burden compared to prior.  Moderate left hydroureteronephrosis down to an obstructing 5.8 mm calculus in the distal left " ureter.    Bladder: No evidence of wall thickening.    Reproductive organs: Unremarkable.    GI Tract/Mesentery: No evidence of bowel obstruction or inflammation.  Normal appendix.    Peritoneal Space: No ascites. No free air.    Retroperitoneum: No significant adenopathy.    Abdominal wall: Small fat containing umbilical hernia.    Vasculature: No significant atherosclerosis or aneurysm.    Bones: No acute fracture.      Impression    Hepatomegaly with diffuse steatosis.    Multiple nonobstructing right renal calculi with interval increased stone burden compared to prior.    Moderate left hydroureteronephrosis down to an obstructing 5.8 mm calculus in the distal left ureter.    Additional findings as above.      Electronically signed by: Apolinar Penaloza MD  Date:    10/09/2024  Time:    21:39     No results found for this or any previous visit (from the past 2160 hours).  FL Flouro Usage  Narrative: EXAMINATION:  FL FLOURO USAGE    CLINICAL HISTORY:  cysto;    TECHNIQUE:  Intraoperative fluoroscopy provided for cystoscopy.    COMPARISON:  None    FINDINGS:  No images submitted.  Impression: Status post cystoscopy.    Please refer to the operative report for complete evaluation.    Electronically signed by: King Hart  Date:    10/13/2024  Time:    09:35         ASSESSMENT     1. Left ureteral stone        PLAN:     Patient understands he has not indwelling double-J stent which must be removed in the postop period.  We agreed to proceed with ureteroscopy and laser lithotripsy.  Risks include but not limited to ureteral or bladder perforation, ureteral stricture and loss of kidney, bleeding, urosepsis, chronic pain, inability to remove the stone, as well as anesthetic risks.  All questions were answered.  I have placed a case request the in the EMR.    Cuco Paulson MD  Urology  [facility]     Disclaimer: This note has been generated using voice-recognition software. There may be typographical  errors that have been missed during proof-reading.

## 2024-10-29 ENCOUNTER — HOSPITAL ENCOUNTER (OUTPATIENT)
Dept: RADIOLOGY | Facility: HOSPITAL | Age: 34
Discharge: HOME | End: 2024-10-29
Attending: SPECIALIST
Payer: COMMERCIAL

## 2024-10-29 ENCOUNTER — OFFICE VISIT (OUTPATIENT)
Dept: UROLOGY | Facility: CLINIC | Age: 34
End: 2024-10-29
Attending: SPECIALIST
Payer: COMMERCIAL

## 2024-10-29 DIAGNOSIS — N20.0 NEPHROLITHIASIS: Primary | ICD-10-CM

## 2024-10-29 DIAGNOSIS — N20.0 NEPHROLITHIASIS: ICD-10-CM

## 2024-10-29 PROCEDURE — 99999 PR PBB SHADOW E&M-EST. PATIENT-LVL I: CPT | Mod: PBBFAC,,, | Performed by: SPECIALIST

## 2024-10-29 PROCEDURE — 74018 RADEX ABDOMEN 1 VIEW: CPT | Mod: TC

## 2024-10-29 PROCEDURE — 74018 RADEX ABDOMEN 1 VIEW: CPT | Mod: 26,,, | Performed by: RADIOLOGY

## 2024-10-29 PROCEDURE — 99214 OFFICE O/P EST MOD 30 MIN: CPT | Mod: S$GLB,,, | Performed by: SPECIALIST

## 2024-10-29 PROCEDURE — 4010F ACE/ARB THERAPY RXD/TAKEN: CPT | Mod: CPTII,S$GLB,, | Performed by: SPECIALIST

## 2024-10-30 ENCOUNTER — HOSPITAL ENCOUNTER (OUTPATIENT)
Facility: HOSPITAL | Age: 34
Discharge: HOME | End: 2024-10-30
Attending: SPECIALIST | Admitting: SPECIALIST
Payer: COMMERCIAL

## 2024-10-30 ENCOUNTER — ANESTHESIA EVENT (OUTPATIENT)
Dept: SURGERY | Facility: HOSPITAL | Age: 34
End: 2024-10-30
Payer: COMMERCIAL

## 2024-10-30 ENCOUNTER — ANESTHESIA (OUTPATIENT)
Dept: SURGERY | Facility: HOSPITAL | Age: 34
End: 2024-10-30
Payer: COMMERCIAL

## 2024-10-30 VITALS
OXYGEN SATURATION: 96 % | RESPIRATION RATE: 16 BRPM | DIASTOLIC BLOOD PRESSURE: 88 MMHG | HEART RATE: 78 BPM | SYSTOLIC BLOOD PRESSURE: 159 MMHG | TEMPERATURE: 97 F

## 2024-10-30 DIAGNOSIS — N13.5 OBSTRUCTION OF LEFT URETER: Primary | ICD-10-CM

## 2024-10-30 PROCEDURE — 25000003 PHARM REV CODE 250: Performed by: SPECIALIST

## 2024-10-30 PROCEDURE — 63600175 PHARM REV CODE 636 W HCPCS: Performed by: SPECIALIST

## 2024-10-30 PROCEDURE — 63600175 PHARM REV CODE 636 W HCPCS: Performed by: NURSE ANESTHETIST, CERTIFIED REGISTERED

## 2024-10-30 PROCEDURE — 25000003 PHARM REV CODE 250

## 2024-10-30 PROCEDURE — 37000009 HC ANESTHESIA EA ADD 15 MINS: Performed by: NURSE ANESTHETIST, CERTIFIED REGISTERED

## 2024-10-30 PROCEDURE — 82365 CALCULUS SPECTROSCOPY: CPT | Performed by: SPECIALIST

## 2024-10-30 PROCEDURE — 37000008 HC ANESTHESIA 1ST 15 MINUTES: Performed by: NURSE ANESTHETIST, CERTIFIED REGISTERED

## 2024-10-30 PROCEDURE — 52353 CYSTOURETERO W/LITHOTRIPSY: CPT | Mod: LT,,, | Performed by: SPECIALIST

## 2024-10-30 PROCEDURE — 63600175 PHARM REV CODE 636 W HCPCS

## 2024-10-30 RX ORDER — ATROPA BELLADONNA AND OPIUM 16.2; 6 MG/1; MG/1
SUPPOSITORY RECTAL
Status: DISCONTINUED | OUTPATIENT
Start: 2024-10-30 | End: 2024-10-30 | Stop reason: HOSPADM

## 2024-10-30 RX ORDER — HYDROCODONE BITARTRATE AND ACETAMINOPHEN 5; 325 MG/1; MG/1
1 TABLET ORAL EVERY 4 HOURS PRN
Status: DISCONTINUED | OUTPATIENT
Start: 2024-10-30 | End: 2024-10-30 | Stop reason: HOSPADM

## 2024-10-30 RX ORDER — KETOROLAC TROMETHAMINE 30 MG/ML
INJECTION, SOLUTION INTRAMUSCULAR; INTRAVENOUS
Status: DISCONTINUED | OUTPATIENT
Start: 2024-10-30 | End: 2024-10-30

## 2024-10-30 RX ORDER — LIDOCAINE HYDROCHLORIDE 20 MG/ML
JELLY TOPICAL
Status: DISCONTINUED | OUTPATIENT
Start: 2024-10-30 | End: 2024-10-30 | Stop reason: HOSPADM

## 2024-10-30 RX ORDER — MIDAZOLAM HYDROCHLORIDE 1 MG/ML
INJECTION INTRAMUSCULAR; INTRAVENOUS
Status: DISCONTINUED | OUTPATIENT
Start: 2024-10-30 | End: 2024-10-30

## 2024-10-30 RX ORDER — DEXAMETHASONE SODIUM PHOSPHATE 4 MG/ML
INJECTION, SOLUTION INTRA-ARTICULAR; INTRALESIONAL; INTRAMUSCULAR; INTRAVENOUS; SOFT TISSUE
Status: DISCONTINUED | OUTPATIENT
Start: 2024-10-30 | End: 2024-10-30

## 2024-10-30 RX ORDER — PROPOFOL 10 MG/ML
VIAL (ML) INTRAVENOUS
Status: DISCONTINUED | OUTPATIENT
Start: 2024-10-30 | End: 2024-10-30

## 2024-10-30 RX ORDER — CEFAZOLIN 2 G/1
2 INJECTION, POWDER, FOR SOLUTION INTRAMUSCULAR; INTRAVENOUS
Status: COMPLETED | OUTPATIENT
Start: 2024-10-30 | End: 2024-10-30

## 2024-10-30 RX ORDER — ONDANSETRON HYDROCHLORIDE 2 MG/ML
INJECTION, SOLUTION INTRAMUSCULAR; INTRAVENOUS
Status: DISCONTINUED | OUTPATIENT
Start: 2024-10-30 | End: 2024-10-30

## 2024-10-30 RX ORDER — LIDOCAINE HYDROCHLORIDE 20 MG/ML
INJECTION, SOLUTION EPIDURAL; INFILTRATION; INTRACAUDAL; PERINEURAL
Status: DISCONTINUED | OUTPATIENT
Start: 2024-10-30 | End: 2024-10-30

## 2024-10-30 RX ORDER — FENTANYL CITRATE 50 UG/ML
INJECTION, SOLUTION INTRAMUSCULAR; INTRAVENOUS
Status: DISCONTINUED | OUTPATIENT
Start: 2024-10-30 | End: 2024-10-30

## 2024-10-30 RX ORDER — ROCURONIUM BROMIDE 10 MG/ML
INJECTION, SOLUTION INTRAVENOUS
Status: DISCONTINUED | OUTPATIENT
Start: 2024-10-30 | End: 2024-10-30

## 2024-10-30 RX ADMIN — PROPOFOL 200 MG: 10 INJECTION, EMULSION INTRAVENOUS at 10:10

## 2024-10-30 RX ADMIN — FENTANYL CITRATE 100 MCG: 0.05 INJECTION, SOLUTION INTRAMUSCULAR; INTRAVENOUS at 10:10

## 2024-10-30 RX ADMIN — LIDOCAINE HYDROCHLORIDE 100 MG: 20 INJECTION, SOLUTION EPIDURAL; INFILTRATION; INTRACAUDAL; PERINEURAL at 10:10

## 2024-10-30 RX ADMIN — SUGAMMADEX 200 MG: 100 INJECTION, SOLUTION INTRAVENOUS at 10:10

## 2024-10-30 RX ADMIN — KETOROLAC TROMETHAMINE 30 MG: 30 INJECTION, SOLUTION INTRAMUSCULAR at 10:10

## 2024-10-30 RX ADMIN — MIDAZOLAM HYDROCHLORIDE 2 MG: 1 INJECTION, SOLUTION INTRAMUSCULAR; INTRAVENOUS at 09:10

## 2024-10-30 RX ADMIN — ROCURONIUM BROMIDE 40 MG: 10 INJECTION, SOLUTION INTRAVENOUS at 10:10

## 2024-10-30 RX ADMIN — HYDROCODONE BITARTRATE AND ACETAMINOPHEN 1 TABLET: 5; 325 TABLET ORAL at 11:10

## 2024-10-30 RX ADMIN — DEXAMETHASONE SODIUM PHOSPHATE 4 MG: 4 INJECTION, SOLUTION INTRAMUSCULAR; INTRAVENOUS at 10:10

## 2024-10-30 RX ADMIN — CEFAZOLIN 2 G: 2 INJECTION, POWDER, FOR SOLUTION INTRAMUSCULAR; INTRAVENOUS at 10:10

## 2024-10-30 RX ADMIN — SODIUM CHLORIDE: 0.9 INJECTION, SOLUTION INTRAVENOUS at 10:10

## 2024-10-30 RX ADMIN — ONDANSETRON 4 MG: 2 INJECTION INTRAMUSCULAR; INTRAVENOUS at 10:10

## 2024-10-30 NOTE — OP NOTE
Error, Delete note    [facility]  Urology  History & Physical    Patient Name: Emmanuel Ervin Jr.  MRN: [id number]  Admission Date: 10/30/2024  Code Status: Prior   Attending Provider: CARL PAULSON MD  Primary Care Physician: Rigo Rico NP  Principal Problem:<principal problem not specified>    Subjective:     HPI:  Patient is a 34-year-old gentleman with a history of recurrent nephrolithiasis.  He is status post left double-J stent placement for intractable pain from a left distal ureteral stone.  He comes forth for definitive management, ureteroscopy laser lithotripsy stone basketing double-J stent placement.  Of note, patient previously expressed his concerns over having another double-J stent placed.  I explained if his case is uneventful I would not place one at the end of the procedure today.  Past Medical History:   Diagnosis Date    Hypertension     Kidney stones     UTI (urinary tract infection)     diagnosed on 4/16/16       Past Surgical History:   Procedure Laterality Date    CYSTOSCOPY W/ URETERAL STENT PLACEMENT Left 10/11/2024    Procedure: CYSTOSCOPY, WITH URETERAL STENT INSERTION;  Surgeon: Carl Paulson MD;  Location: [facility];  Service: Urology;  Laterality: Left;    EXTRACORPOREAL SHOCK WAVE LITHOTRIPSY  2016    multiple since 2009    SKIN GRAFT Right     s/p traumatic injury to foot at 1 y/o    TOE AMPUTATION Right     due to  accident at 1 y/o       Review of patient's allergies indicates:  No Known Allergies    Family History       Problem Relation (Age of Onset)    Diabetes Maternal Aunt, Maternal Grandmother    Heart disease Maternal Aunt, Maternal Grandmother    Hypertension Maternal Grandmother            Tobacco Use    Smoking status: Never    Smokeless tobacco: Never   Substance and Sexual Activity    Alcohol use: No    Drug use: No    Sexual activity: Yes     Partners: Female       Review of Systems   All other systems reviewed and are  "negative.      Objective:     Temp:  [97.1 °F (36.2 °C)-97.4 °F (36.3 °C)] 97.1 °F (36.2 °C)  Pulse:  [78-94] 78  Resp:  [16-18] 16  SpO2:  [95 %-98 %] 96 %  BP: (119-159)/(71-88) 159/88     There is no height or weight on file to calculate BMI.    No intake/output data recorded.       Lines/Drains/Airways       None                   Physical Exam  Constitutional:       Appearance: Normal appearance.   HENT:      Head: Normocephalic and atraumatic.      Nose: Nose normal.      Mouth/Throat:      Mouth: Mucous membranes are moist.   Eyes:      Extraocular Movements: Extraocular movements intact.   Cardiovascular:      Rate and Rhythm: Normal rate and regular rhythm.   Pulmonary:      Effort: Pulmonary effort is normal.      Breath sounds: Normal breath sounds.   Abdominal:      Palpations: Abdomen is soft.   Genitourinary:     Penis: Normal.    Musculoskeletal:         General: Normal range of motion.      Cervical back: Neck supple.   Skin:     General: Skin is warm.   Neurological:      General: No focal deficit present.      Mental Status: He is alert and oriented to person, place, and time.   Psychiatric:         Mood and Affect: Mood normal.         Behavior: Behavior normal.         Significant Labs:  BMP:  No results for input(s): "NA", "K", "CL", "CO2", "BUN", "CREATININE", "LABGLOM", "GLUCOSE", "CALCIUM" in the last 168 hours.    CBC:  No results for input(s): "WBC", "HGB", "HCT", "PLT" in the last 168 hours.    All pertinent labs results from the past 24 hours have been reviewed.    Significant Imaging:  All pertinent imaging results/findings from the past 24 hours have been reviewed.    Assessment and Plan:     There are no hospital problems to display for this patient.      VTE Risk Mitigation (From admission, onward)           Ordered     IP VTE HIGH RISK PATIENT  Once         10/30/24 0943                  Impression:  Left distal ureteral stone and status post left double-J stent placement  Plan:  " Left ureteroscopy laser lithotripsy stone basketing.   CARL HE MD  Urology  [facility]

## 2024-10-30 NOTE — H&P
[facility]  Urology  History & Physical    Patient Name: Emmanuel Ervin Jr.  MRN: [id number]  Admission Date: 10/30/2024  Code Status: Prior   Attending Provider: CARL PAULSON MD  Primary Care Physician: Rigo Rico NP  Principal Problem:<principal problem not specified>    Subjective:     HPI: 34 y.o. M with a persistent left distal ureteral stone, s/p JJ stent placement, for left ureteroscopy, laser lithotripsy, possible JJ stent placement.    Past Medical History:   Diagnosis Date    Hypertension     Kidney stones     UTI (urinary tract infection)     diagnosed on 4/16/16       Past Surgical History:   Procedure Laterality Date    CYSTOSCOPY W/ URETERAL STENT PLACEMENT Left 10/11/2024    Procedure: CYSTOSCOPY, WITH URETERAL STENT INSERTION;  Surgeon: Carl Paulson MD;  Location: [facility] OR;  Service: Urology;  Laterality: Left;    EXTRACORPOREAL SHOCK WAVE LITHOTRIPSY  2016    multiple since 2009    SKIN GRAFT Right     s/p traumatic injury to foot at 1 y/o    TOE AMPUTATION Right     due to  accident at 1 y/o       Review of patient's allergies indicates:  No Known Allergies    Family History       Problem Relation (Age of Onset)    Diabetes Maternal Aunt, Maternal Grandmother    Heart disease Maternal Aunt, Maternal Grandmother    Hypertension Maternal Grandmother            Tobacco Use    Smoking status: Never    Smokeless tobacco: Never   Substance and Sexual Activity    Alcohol use: No    Drug use: No    Sexual activity: Yes     Partners: Female       Review of Systems   Genitourinary:  Positive for flank pain.       Objective:     Temp:  [97.4 °F (36.3 °C)] 97.4 °F (36.3 °C)  Pulse:  [91] 91  Resp:  [18] 18  SpO2:  [97 %] 97 %  BP: (138)/(88) 138/88     There is no height or weight on file to calculate BMI.    No intake/output data recorded.       Lines/Drains/Airways       Peripheral Intravenous Line  Duration                  Peripheral IV - Single Lumen 10/30/24 0832 22 G 1 in  "No Right Antecubital <1 day                    Physical Exam  Vitals reviewed.   Constitutional:       Appearance: Normal appearance.   HENT:      Head: Normocephalic and atraumatic.      Right Ear: External ear normal.      Left Ear: External ear normal.      Nose: Nose normal.      Mouth/Throat:      Mouth: Mucous membranes are moist.   Eyes:      Extraocular Movements: Extraocular movements intact.   Cardiovascular:      Rate and Rhythm: Normal rate and regular rhythm.   Pulmonary:      Effort: Pulmonary effort is normal.      Breath sounds: Normal breath sounds.   Abdominal:      Palpations: Abdomen is soft.   Genitourinary:     Penis: Normal.    Musculoskeletal:         General: Normal range of motion.      Cervical back: Neck supple.   Skin:     General: Skin is warm.   Neurological:      General: No focal deficit present.      Mental Status: He is alert and oriented to person, place, and time.   Psychiatric:         Mood and Affect: Mood normal.         Behavior: Behavior normal.         Significant Labs:  BMP:  No results for input(s): "NA", "K", "CL", "CO2", "BUN", "CREATININE", "LABGLOM", "GLUCOSE", "CALCIUM" in the last 168 hours.    CBC:  No results for input(s): "WBC", "HGB", "HCT", "PLT" in the last 168 hours.    All pertinent labs results from the past 24 hours have been reviewed.    Significant Imaging:  All pertinent imaging results/findings from the past 24 hours have been reviewed.    Assessment and Plan:     There are no hospital problems to display for this patient.      VTE Risk Mitigation (From admission, onward)      None          Imp: Left ureteral stone  Plan: URS, laser lithotripsy, possible JJ stent placement  CARL HE MD  Urology  [facility]    "

## 2024-10-30 NOTE — DISCHARGE SUMMARY
[facility] - Surgery  Discharge Note  Short Stay    Procedure(s) (LRB):  *LITHOTRIPSY, USING LASER* INACTIVE (Left)      OUTCOME: Patient tolerated treatment/procedure well without complication and is now ready for discharge.    DISPOSITION: Home or Self Care    FINAL DIAGNOSIS: left ureteral stoen  FOLLOWUP: In clinic    DISCHARGE INSTRUCTIONS:    Discharge Procedure Orders   Diet general        TIME SPENT ON DISCHARGE: 20 minutes

## 2024-10-31 ENCOUNTER — TELEPHONE (OUTPATIENT)
Dept: UROLOGY | Facility: CLINIC | Age: 34
End: 2024-10-31
Payer: COMMERCIAL

## 2024-10-31 RX ORDER — KETOROLAC TROMETHAMINE 10 MG/1
10 TABLET, FILM COATED ORAL EVERY 6 HOURS PRN
Qty: 12 TABLET | Refills: 0 | Status: SHIPPED | OUTPATIENT
Start: 2024-10-31

## 2024-11-04 ENCOUNTER — TELEPHONE (OUTPATIENT)
Dept: UROLOGY | Facility: CLINIC | Age: 34
End: 2024-11-04
Payer: COMMERCIAL

## 2024-11-04 NOTE — TELEPHONE ENCOUNTER
----- Message from Cuco Paulson MD sent at 2024 10:29 AM CST -----  Contact: WIFE - MARCO  I discussed pain meds with him pre-op, he had seveal at home.  I also intentionally did not leave a JJ stent to minimize amount of pain medication  M  ----- Message -----  From: Ariadne Cook MA  Sent: 10/31/2024   1:48 PM CST  To: Cuco Paulson MD    Patient is having some pain from procedure yesterday, would like to know if you can prescribe him something for pain. If so, he would like it sent to [facility] in House. Please advise.  ----- Message -----  From: Mima Asencio  Sent: 10/31/2024   1:30 PM CDT  To: Lorene Garcia    Emmanuel Ervin   MRN: [id number]  : 1990  PCP: Rigo Rico  Home Phone      [phone]  Work Phone      Not on file.  Mobile          [phone]      MESSAGE: Wife states that the patient had a procedure yesterday and would like to know if Dr. Paulson can prescribe the patient something for pain.  If so they would like it sent to [facility].          Phone: [phone]

## 2024-11-04 NOTE — OP NOTE
[facility]  Surgery Department  Operative Note    SUMMARY   Outstanding   Op Note  Date of Procedure: 10/30/2024     [facility]  Surgery Department  Operative Note     SUMMARY      Date of Procedure: 10/30/2024      Procedure: Procedure(s) (LRB):  *LITHOTRIPSY, USING LASER* INACTIVE (Left)      Surgeons and Role:     * Cuco Paulson MD - Primary     Assisting Surgeon: None     Pre-Operative Diagnosis: Left nephrolithiasis [N20.0]     Post-Operative Diagnosis: Post-Op Diagnosis Codes:     * Left nephrolithiasis [N20.0]     Anesthesia: General     Operative Findings (including complications, if any): no complications     Description of Technical Procedures: Patient is a 34-year-old gentleman with a history of recurrent nephrolithiasis.  He is status post left double-J stent placement for intractable pain from a left distal ureteral stone.  He comes forth for definitive management, ureteroscopy laser lithotripsy stone basketing double-J stent placement.  Of note, patient previously expressed his concerns over having another double-J stent placed.  I explained if his case is uneventful I would not place one at the end of the procedure today.         Procedure: patient taken to OR and given general anesthesia.  Placed in dorsolithotomy position.  Genitalia prepped and draped.  Time out performed, abx had been adminstered, left side confirmed.  Cystoscopic exam performed, no stones appreciated.  Grasping forceps were used to retried indwelling JJ stent.  Left ureteral orifice cannulated with 035 guidewire and advanced up into left collecting system under fluroscopic guidance.  Ureter was patulous, I did not use a second guide wire.  Along side my guidewire, I advanced a semirigid ureteroscope under direct vision.  A roughly 6 mm stone was appreciated in distal ureter.  I used a 365 micron holmium laser fiber.  I used a dusting mode to pulverize stone.  A few small 2 mm fragments were retrieved with a  [employer] N-compass basket and sent for chemical stone analysis.  I elected NOT to replace a JJ stent due to patient's poor pain tolerance.  Pt. Tolerated procedure well.     Significant Surgical Tasks Conducted by the Assistant(s), if Applicable: none     Estimated Blood Loss (EBL): * No values recorded between 10/30/2024 10:24 AM and 10/30/2024 10:47 AM *           Implants: * No implants in log *     Specimens:   Specimen (24h ago, onward)        None                      Condition: Good

## 2024-11-04 NOTE — OP NOTE
[facility] - Surgery  Surgery Department  Operative Note    SUMMARY     Date of Procedure: 10/30/2024     Procedure: Procedure(s) (LRB):  *LITHOTRIPSY, USING LASER* INACTIVE (Left)     Surgeons and Role:     * Cuco Paulson MD - Primary    Assisting Surgeon: None    Pre-Operative Diagnosis: Left nephrolithiasis [N20.0]    Post-Operative Diagnosis: Post-Op Diagnosis Codes:     * Left nephrolithiasis [N20.0]    Anesthesia: General    Operative Findings (including complications, if any): no complications    Description of Technical Procedures: Patient is a 34-year-old gentleman with a history of recurrent nephrolithiasis.  He is status post left double-J stent placement for intractable pain from a left distal ureteral stone.  He comes forth for definitive management, ureteroscopy laser lithotripsy stone basketing double-J stent placement.  Of note, patient previously expressed his concerns over having another double-J stent placed.  I explained if his case is uneventful I would not place one at the end of the procedure today.       Procedure: patient taken to OR and given general anesthesia.  Placed in dorsolithotomy position.  Genitalia prepped and draped.  Time out performed, abx had been adminstered, left side confirmed.  Cystoscopic exam performed, no stones appreciated.  Grasping forceps were used to retried indwelling JJ stent.  Left ureteral orifice cannulated with 035 guidewire and advanced up into left collecting system under fluroscopic guidance.  Ureter was patulous, I did not use a second guide wire.  Along side my guidewire, I advanced a semirigid ureteroscope under direct vision.  A roughly 6 mm stone was appreciated in distal ureter.  I used a 365 micron holmium laser fiber.  I used a dusting mode to pulverize stone.  A few small 2 mm fragments were retrieved with a [employer] N-compass basket and sent for chemical stone analysis.  I elected NOT to replace a JJ stent due to patient's poor pain tolerance.   Pt. Tolerated procedure well.    Significant Surgical Tasks Conducted by the Assistant(s), if Applicable: none    Estimated Blood Loss (EBL): * No values recorded between 10/30/2024 10:24 AM and 10/30/2024 10:47 AM *           Implants: * No implants in log *    Specimens:   Specimen (24h ago, onward)      None                    Condition: Good    Disposition: PACU - hemodynamically stable.    Attestation: Op Note Attestation: I performed the procedure.

## 2024-11-04 NOTE — TELEPHONE ENCOUNTER
"Notified patients wife "I discussed pain meds with him pre-op, he had seveal at home.  I also intentionally did not leave a JJ stent to minimize amount of pain medication" states that he no longer needs pain medication. V/u.    "

## 2024-11-07 LAB
COMPN STONE: NORMAL
LABORATORY COMMENT REPORT: NORMAL
SPECIMEN SOURCE: NORMAL
STONE ANALYSIS IR-IMP: NORMAL

## 2025-04-09 ENCOUNTER — HOSPITAL ENCOUNTER (EMERGENCY)
Facility: HOSPITAL | Age: 35
Discharge: HOME | End: 2025-04-09
Attending: STUDENT IN AN ORGANIZED HEALTH CARE EDUCATION/TRAINING PROGRAM
Payer: COMMERCIAL

## 2025-04-09 VITALS
RESPIRATION RATE: 16 BRPM | DIASTOLIC BLOOD PRESSURE: 104 MMHG | TEMPERATURE: 99 F | BODY MASS INDEX: 37.45 KG/M2 | HEART RATE: 106 BPM | SYSTOLIC BLOOD PRESSURE: 170 MMHG | OXYGEN SATURATION: 97 % | WEIGHT: 268.5 LBS

## 2025-04-09 DIAGNOSIS — R11.2 NAUSEA AND VOMITING, UNSPECIFIED VOMITING TYPE: Primary | ICD-10-CM

## 2025-04-09 LAB
ABSOLUTE EOSINOPHIL (OHS): 0.01 K/UL
ABSOLUTE MONOCYTE (OHS): 1.42 K/UL (ref 0.3–1)
ABSOLUTE NEUTROPHIL COUNT (OHS): 13.93 K/UL (ref 1.8–7.7)
ALBUMIN SERPL BCP-MCNC: 4.4 G/DL (ref 3.5–5.2)
ALP SERPL-CCNC: 100 UNIT/L (ref 40–150)
ALT SERPL W/O P-5'-P-CCNC: 50 UNIT/L (ref 10–44)
ANION GAP (OHS): 11 MMOL/L (ref 8–16)
AST SERPL-CCNC: 34 UNIT/L (ref 11–45)
BASOPHILS # BLD AUTO: 0.06 K/UL
BASOPHILS NFR BLD AUTO: 0.4 %
BILIRUB SERPL-MCNC: 1.2 MG/DL (ref 0.1–1)
BILIRUB UR QL STRIP.AUTO: NEGATIVE
BUN SERPL-MCNC: 15 MG/DL (ref 6–20)
CALCIUM SERPL-MCNC: 9.4 MG/DL (ref 8.7–10.5)
CHLORIDE SERPL-SCNC: 105 MMOL/L (ref 95–110)
CLARITY UR: CLEAR
CO2 SERPL-SCNC: 22 MMOL/L (ref 23–29)
COLOR UR AUTO: YELLOW
CREAT SERPL-MCNC: 1.1 MG/DL (ref 0.5–1.4)
ERYTHROCYTE [DISTWIDTH] IN BLOOD BY AUTOMATED COUNT: 13.7 % (ref 11.5–14.5)
GFR SERPLBLD CREATININE-BSD FMLA CKD-EPI: >60 ML/MIN/1.73/M2
GLUCOSE SERPL-MCNC: 118 MG/DL (ref 70–110)
GLUCOSE UR QL STRIP: NEGATIVE
HCT VFR BLD AUTO: 42.6 % (ref 40–54)
HGB BLD-MCNC: 14.5 GM/DL (ref 14–18)
HGB UR QL STRIP: NEGATIVE
IMM GRANULOCYTES # BLD AUTO: 0.08 K/UL (ref 0–0.04)
IMM GRANULOCYTES NFR BLD AUTO: 0.5 % (ref 0–0.5)
KETONES UR QL STRIP: NEGATIVE
LEUKOCYTE ESTERASE UR QL STRIP: NEGATIVE
LIPASE SERPL-CCNC: 25 U/L (ref 4–60)
LYMPHOCYTES # BLD AUTO: 0.89 K/UL (ref 1–4.8)
MCH RBC QN AUTO: 27.8 PG (ref 27–31)
MCHC RBC AUTO-ENTMCNC: 34 G/DL (ref 32–36)
MCV RBC AUTO: 82 FL (ref 82–98)
NITRITE UR QL STRIP: NEGATIVE
NUCLEATED RBC (/100WBC) (OHS): 0 /100 WBC
PH UR STRIP: 7 [PH]
PLATELET # BLD AUTO: 212 K/UL (ref 150–450)
PMV BLD AUTO: 11.9 FL (ref 9.2–12.9)
POTASSIUM SERPL-SCNC: 4.1 MMOL/L (ref 3.5–5.1)
PROT SERPL-MCNC: 7.9 GM/DL (ref 6–8.4)
PROT UR QL STRIP: NEGATIVE
RBC # BLD AUTO: 5.22 M/UL (ref 4.6–6.2)
RELATIVE EOSINOPHIL (OHS): 0.1 %
RELATIVE LYMPHOCYTE (OHS): 5.4 % (ref 18–48)
RELATIVE MONOCYTE (OHS): 8.7 % (ref 4–15)
RELATIVE NEUTROPHIL (OHS): 84.9 % (ref 38–73)
SODIUM SERPL-SCNC: 138 MMOL/L (ref 136–145)
SP GR UR STRIP: 1.02
UROBILINOGEN UR STRIP-ACNC: NEGATIVE EU/DL
WBC # BLD AUTO: 16.39 K/UL (ref 3.9–12.7)

## 2025-04-09 PROCEDURE — 96374 THER/PROPH/DIAG INJ IV PUSH: CPT

## 2025-04-09 PROCEDURE — 99284 EMERGENCY DEPT VISIT MOD MDM: CPT | Mod: 25

## 2025-04-09 PROCEDURE — 96361 HYDRATE IV INFUSION ADD-ON: CPT

## 2025-04-09 PROCEDURE — 85025 COMPLETE CBC W/AUTO DIFF WBC: CPT | Performed by: STUDENT IN AN ORGANIZED HEALTH CARE EDUCATION/TRAINING PROGRAM

## 2025-04-09 PROCEDURE — 25000003 PHARM REV CODE 250: Performed by: STUDENT IN AN ORGANIZED HEALTH CARE EDUCATION/TRAINING PROGRAM

## 2025-04-09 PROCEDURE — 84450 TRANSFERASE (AST) (SGOT): CPT | Performed by: STUDENT IN AN ORGANIZED HEALTH CARE EDUCATION/TRAINING PROGRAM

## 2025-04-09 PROCEDURE — 63600175 PHARM REV CODE 636 W HCPCS: Performed by: STUDENT IN AN ORGANIZED HEALTH CARE EDUCATION/TRAINING PROGRAM

## 2025-04-09 PROCEDURE — 81003 URINALYSIS AUTO W/O SCOPE: CPT | Performed by: STUDENT IN AN ORGANIZED HEALTH CARE EDUCATION/TRAINING PROGRAM

## 2025-04-09 PROCEDURE — 83690 ASSAY OF LIPASE: CPT | Performed by: STUDENT IN AN ORGANIZED HEALTH CARE EDUCATION/TRAINING PROGRAM

## 2025-04-09 RX ORDER — ONDANSETRON HYDROCHLORIDE 2 MG/ML
4 INJECTION, SOLUTION INTRAVENOUS
Status: COMPLETED | OUTPATIENT
Start: 2025-04-09 | End: 2025-04-09

## 2025-04-09 RX ORDER — ONDANSETRON 4 MG/1
4 TABLET, FILM COATED ORAL EVERY 6 HOURS
Qty: 12 TABLET | Refills: 0 | Status: SHIPPED | OUTPATIENT
Start: 2025-04-09

## 2025-04-09 RX ADMIN — SODIUM CHLORIDE 1000 ML: 9 INJECTION, SOLUTION INTRAVENOUS at 10:04

## 2025-04-09 RX ADMIN — SODIUM CHLORIDE 1000 ML: 9 INJECTION, SOLUTION INTRAVENOUS at 09:04

## 2025-04-09 RX ADMIN — ONDANSETRON 4 MG: 2 INJECTION INTRAMUSCULAR; INTRAVENOUS at 09:04

## 2025-04-09 NOTE — ED NOTES
Provided pt with urine cup, antiseptic wipe, and instructions on how to collect a clean catch urine specimen. Pt verbalized understanding.

## 2025-04-09 NOTE — ED PROVIDER NOTES
Encounter Date: 4/9/2025       History     Chief Complaint   Patient presents with    Vomiting     Pt arrives to the ed with c/o  vomiting and nausea onset this morning. Pt reports eating shasta noodles last night.     34-year-old male with history of hypertension and kidney stones presenting with nausea and vomiting that began this morning.  Patient reports no abdominal pain.  States he ate a ramen noodle couple last night that he thinks may have been off.  No back pain or dysuria.  No other complaints.      Review of patient's allergies indicates:  No Known Allergies  Past Medical History:   Diagnosis Date    Hypertension     Kidney stones     UTI (urinary tract infection)     diagnosed on 4/16/16     Past Surgical History:   Procedure Laterality Date    CYSTOSCOPY W/ URETERAL STENT PLACEMENT Left 10/11/2024    Procedure: CYSTOSCOPY, WITH URETERAL STENT INSERTION;  Surgeon: Cuco Paulson MD;  [facility];  Service: Urology;  Laterality: Left;    EXTRACORPOREAL SHOCK WAVE LITHOTRIPSY  2016    multiple since 2009    SKIN GRAFT Right     s/p traumatic injury to foot at 1 y/o    TOE AMPUTATION Right     due to  accident at 1 y/o    URETEROSCOPY, WITH LASER LITHOTRIPSY Left 10/30/2024    Procedure: CYSTOURETEROSCOPY, WITH HOLMIUM LASER LITHOTRIPSY OF URETERAL CALCULUS;  Surgeon: Cuco Paulson MD;  [facility];  Service: Urology;  Laterality: Left;     Family History   Problem Relation Name Age of Onset    Diabetes Maternal Aunt      Heart disease Maternal Aunt      Diabetes Maternal Grandmother      Hypertension Maternal Grandmother      Heart disease Maternal Grandmother      Prostate cancer Neg Hx      Kidney disease Neg Hx       Social History[1]  Review of Systems   Constitutional:  Negative for fever.   HENT:  Negative for sore throat.    Respiratory:  Negative for shortness of breath.    Cardiovascular:  Negative for chest pain.   Gastrointestinal:  Positive for nausea and vomiting.  Negative for abdominal pain and diarrhea.   Genitourinary:  Negative for dysuria and flank pain.   Musculoskeletal:  Negative for back pain.   Skin:  Negative for rash.   Neurological:  Negative for weakness.   Hematological:  Does not bruise/bleed easily.       Physical Exam     Initial Vitals [04/09/25 0850]   BP Pulse Resp Temp SpO2   (!) 158/97 (!) 115 16 99 °F (37.2 °C) 97 %      MAP       --         Physical Exam    Nursing note and vitals reviewed.  Constitutional: He appears well-developed.   Eyes: EOM are normal.   Neck:   Normal range of motion.  Cardiovascular:            No murmur heard.  Pulmonary/Chest: No respiratory distress.   Abdominal: He exhibits no distension.   No TTP diffusely. No guarding, rebound, or masses. Negative Howell's sign. No TTP at McBurney's point. No CVAT bilaterally.     Musculoskeletal:         General: Normal range of motion.      Cervical back: Normal range of motion.     Neurological: He is alert.   Skin: Skin is warm.   Psychiatric: He has a normal mood and affect.         ED Course   Procedures  Labs Reviewed   COMPREHENSIVE METABOLIC PANEL - Abnormal       Result Value    Sodium 138      Potassium 4.1      Chloride 105      CO2 22 (*)     Glucose 118 (*)     BUN 15      Creatinine 1.1      Calcium 9.4      Protein Total 7.9      Albumin 4.4      Bilirubin Total 1.2 (*)           AST 34      ALT 50 (*)     Anion Gap 11      eGFR >60     CBC WITH DIFFERENTIAL - Abnormal    WBC 16.39 (*)     RBC 5.22      HGB 14.5      HCT 42.6      MCV 82      MCH 27.8      MCHC 34.0      RDW 13.7      Platelet Count 212      MPV 11.9      Nucleated RBC 0      Neut % 84.9 (*)     Lymph % 5.4 (*)     Mono % 8.7      Eos % 0.1      Basophil % 0.4      Imm Grans % 0.5      Neut # 13.93 (*)     Lymph # 0.89 (*)     Mono # 1.42 (*)     Eos # 0.01      Baso # 0.06      Imm Grans # 0.08 (*)    LIPASE - Normal    Lipase Level 25     URINALYSIS, REFLEX TO URINE CULTURE - Normal    Color, UA  Yellow      Appearance, UA Clear      pH, UA 7.0      Spec Grav UA 1.020      Protein, UA Negative      Glucose, UA Negative      Ketones, UA Negative      Bilirubin, UA Negative      Blood, UA Negative      Nitrites, UA Negative      Urobilinogen, UA Negative      Leukocyte Esterase, UA Negative     CBC W/ AUTO DIFFERENTIAL    Narrative:     The following orders were created for panel order Complete Blood Count (CBC).  Procedure                               Abnormality         Status                     ---------                               -----------         ------                     CBC with Differential[[id number]]       Abnormal            Final result                 Please view results for these tests on the individual orders.          Imaging Results    None          Medications   ondansetron injection 4 mg (4 mg Intravenous Given 4/9/25 0971)   sodium chloride 0.9% bolus 1,000 mL 1,000 mL (0 mLs Intravenous Stopped 4/9/25 1026)   sodium chloride 0.9% bolus 1,000 mL 1,000 mL (0 mLs Intravenous Stopped 4/9/25 1115)     Medical Decision Making  DDX: Unlikely acute abdominal pathology such as appendicitis/cholecystitis given benign abdomen, negative Howell's sign at this time. R/o pancreatitis, UTI. Possible GERD/gastritis vs. AGE given history, benign abdomen.  DX: BMP, CBC, LFT, lipase, UA/Udip. Serial abdominal exams. Consider CT A/P if change in abdominal exam to assess for early appendicitis. Consider ultrasound if change in abdominal exam to assess for cholecystitis.  TX: Analgesia PRN. Antiemetic PRN. IVF. Treatment/consult as indicated by studies.  Dispo: Pending studies. If studies WNL, symptoms controlled, tolerating PO, discharge to follow up with primary doctor within 2 days with recommendations for supportive care at home and strict precautions for return.        Amount and/or Complexity of Data Reviewed  Labs: ordered.    Risk  Prescription drug management.               ED Course as of  04/10/25 0656   Wed Apr 09, 2025   1032 Nausea resolved.  Patient is still tachycardic, will treat with a another L. [NB]   1131 Kem So 11:31 AM  Symptoms improved. Discussed results, findings, supportive care, follow up recommendations, and return to ED precautions with this patient. Patient verbalized understanding and agreement.  Patient is stable for discharge at this time.   [NB]      ED Course User Index  [NB] Kem So MD                           Clinical Impression:  Final diagnoses:  [R11.2] Nausea and vomiting, unspecified vomiting type (Primary)          ED Disposition Condition    Discharge Stable          ED Prescriptions       Medication Sig Dispense Start Date End Date Auth. Provider    ondansetron (ZOFRAN) 4 MG tablet Take 1 tablet (4 mg total) by mouth every 6 (six) hours. 12 tablet 4/9/2025 -- Kem So MD          Follow-up Information       Follow up With Specialties Details Why Contact Info    Rigo Rico NP Family Medicine Schedule an appointment as soon as possible for a visit in 2 days  [address] [place] [address]  3RD FLOOR  [facility]  [place] [address]  [phone]      [facility] - Emergency Dept Emergency Medicine  If symptoms worsen [address] [place] [id number]  [phone]               Kem So MD  04/09/25 0911       Kem So MD  04/09/25 1039       [1]   Social History  Tobacco Use    Smoking status: Never    Smokeless tobacco: Never   Substance Use Topics    Alcohol use: No    Drug use: No        Kem So MD  04/10/25 0656
